# Patient Record
Sex: MALE | Race: WHITE | NOT HISPANIC OR LATINO
[De-identification: names, ages, dates, MRNs, and addresses within clinical notes are randomized per-mention and may not be internally consistent; named-entity substitution may affect disease eponyms.]

---

## 2017-03-02 ENCOUNTER — APPOINTMENT (OUTPATIENT)
Dept: UROLOGY | Facility: CLINIC | Age: 67
End: 2017-03-02

## 2017-03-07 ENCOUNTER — APPOINTMENT (OUTPATIENT)
Dept: UROLOGY | Facility: CLINIC | Age: 67
End: 2017-03-07

## 2017-04-05 ENCOUNTER — APPOINTMENT (OUTPATIENT)
Dept: UROLOGY | Facility: CLINIC | Age: 67
End: 2017-04-05

## 2017-04-05 LAB
BILIRUB UR QL STRIP: NORMAL
CLARITY UR: CLEAR
COLLECTION METHOD: NORMAL
GLUCOSE UR-MCNC: NORMAL
HCG UR QL: 0.2 EU/DL
HGB UR QL STRIP.AUTO: NORMAL
KETONES UR-MCNC: NORMAL
LEUKOCYTE ESTERASE UR QL STRIP: NORMAL
NITRITE UR QL STRIP: NORMAL
PH UR STRIP: 5.5
PROT UR STRIP-MCNC: NORMAL
SP GR UR STRIP: 1.02

## 2017-04-05 RX ORDER — AMOXICILLIN AND CLAVULANATE POTASSIUM 875; 125 MG/1; MG/1
875-125 TABLET, COATED ORAL
Qty: 14 | Refills: 0 | Status: COMPLETED | COMMUNITY
Start: 2016-12-31

## 2017-04-06 LAB — PSA SERPL-MCNC: 3.14 NG/ML

## 2017-10-04 ENCOUNTER — TRANSCRIPTION ENCOUNTER (OUTPATIENT)
Age: 67
End: 2017-10-04

## 2017-10-04 ENCOUNTER — OUTPATIENT (OUTPATIENT)
Dept: EMERGENCY DEPT | Facility: HOSPITAL | Age: 67
LOS: 1 days | Discharge: HOME | End: 2017-10-04
Payer: COMMERCIAL

## 2017-10-04 VITALS
RESPIRATION RATE: 15 BRPM | TEMPERATURE: 99 F | SYSTOLIC BLOOD PRESSURE: 166 MMHG | OXYGEN SATURATION: 99 % | HEART RATE: 67 BPM | DIASTOLIC BLOOD PRESSURE: 78 MMHG

## 2017-10-04 VITALS
DIASTOLIC BLOOD PRESSURE: 76 MMHG | HEART RATE: 64 BPM | SYSTOLIC BLOOD PRESSURE: 159 MMHG | TEMPERATURE: 98 F | RESPIRATION RATE: 16 BRPM | OXYGEN SATURATION: 100 %

## 2017-10-04 DIAGNOSIS — Z95.5 PRESENCE OF CORONARY ANGIOPLASTY IMPLANT AND GRAFT: ICD-10-CM

## 2017-10-04 LAB
ALBUMIN SERPL ELPH-MCNC: 4.4 G/DL — SIGNIFICANT CHANGE UP (ref 3.3–5)
ALP SERPL-CCNC: 76 U/L — SIGNIFICANT CHANGE UP (ref 40–120)
ALT FLD-CCNC: 26 U/L — SIGNIFICANT CHANGE UP (ref 4–41)
APTT BLD: 28.4 SEC — SIGNIFICANT CHANGE UP (ref 27.5–37.4)
AST SERPL-CCNC: 23 U/L — SIGNIFICANT CHANGE UP (ref 4–40)
BASOPHILS # BLD AUTO: 0.03 K/UL — SIGNIFICANT CHANGE UP (ref 0–0.2)
BASOPHILS NFR BLD AUTO: 0.3 % — SIGNIFICANT CHANGE UP (ref 0–2)
BILIRUB SERPL-MCNC: 0.9 MG/DL — SIGNIFICANT CHANGE UP (ref 0.2–1.2)
BLD GP AB SCN SERPL QL: NEGATIVE — SIGNIFICANT CHANGE UP
BUN SERPL-MCNC: 23 MG/DL — SIGNIFICANT CHANGE UP (ref 7–23)
CALCIUM SERPL-MCNC: 9.1 MG/DL — SIGNIFICANT CHANGE UP (ref 8.4–10.5)
CHLORIDE SERPL-SCNC: 101 MMOL/L — SIGNIFICANT CHANGE UP (ref 98–107)
CO2 SERPL-SCNC: 25 MMOL/L — SIGNIFICANT CHANGE UP (ref 22–31)
CREAT SERPL-MCNC: 1.07 MG/DL — SIGNIFICANT CHANGE UP (ref 0.5–1.3)
EOSINOPHIL # BLD AUTO: 0.12 K/UL — SIGNIFICANT CHANGE UP (ref 0–0.5)
EOSINOPHIL NFR BLD AUTO: 1.3 % — SIGNIFICANT CHANGE UP (ref 0–6)
GLUCOSE SERPL-MCNC: 157 MG/DL — HIGH (ref 70–99)
HCT VFR BLD CALC: 45.6 % — SIGNIFICANT CHANGE UP (ref 39–50)
HGB BLD-MCNC: 14.9 G/DL — SIGNIFICANT CHANGE UP (ref 13–17)
IMM GRANULOCYTES # BLD AUTO: 0.06 # — SIGNIFICANT CHANGE UP
IMM GRANULOCYTES NFR BLD AUTO: 0.7 % — SIGNIFICANT CHANGE UP (ref 0–1.5)
INR BLD: 1.01 — SIGNIFICANT CHANGE UP (ref 0.88–1.17)
LYMPHOCYTES # BLD AUTO: 1.59 K/UL — SIGNIFICANT CHANGE UP (ref 1–3.3)
LYMPHOCYTES # BLD AUTO: 17.7 % — SIGNIFICANT CHANGE UP (ref 13–44)
MCHC RBC-ENTMCNC: 26.7 PG — LOW (ref 27–34)
MCHC RBC-ENTMCNC: 32.7 % — SIGNIFICANT CHANGE UP (ref 32–36)
MCV RBC AUTO: 81.7 FL — SIGNIFICANT CHANGE UP (ref 80–100)
MONOCYTES # BLD AUTO: 0.6 K/UL — SIGNIFICANT CHANGE UP (ref 0–0.9)
MONOCYTES NFR BLD AUTO: 6.7 % — SIGNIFICANT CHANGE UP (ref 2–14)
NEUTROPHILS # BLD AUTO: 6.59 K/UL — SIGNIFICANT CHANGE UP (ref 1.8–7.4)
NEUTROPHILS NFR BLD AUTO: 73.3 % — SIGNIFICANT CHANGE UP (ref 43–77)
NRBC # FLD: 0 — SIGNIFICANT CHANGE UP
PLATELET # BLD AUTO: 196 K/UL — SIGNIFICANT CHANGE UP (ref 150–400)
PMV BLD: 10.7 FL — SIGNIFICANT CHANGE UP (ref 7–13)
POTASSIUM SERPL-MCNC: 4.3 MMOL/L — SIGNIFICANT CHANGE UP (ref 3.5–5.3)
POTASSIUM SERPL-SCNC: 4.3 MMOL/L — SIGNIFICANT CHANGE UP (ref 3.5–5.3)
PROT SERPL-MCNC: 7.6 G/DL — SIGNIFICANT CHANGE UP (ref 6–8.3)
PROTHROM AB SERPL-ACNC: 11.3 SEC — SIGNIFICANT CHANGE UP (ref 9.8–13.1)
RBC # BLD: 5.58 M/UL — SIGNIFICANT CHANGE UP (ref 4.2–5.8)
RBC # FLD: 13 % — SIGNIFICANT CHANGE UP (ref 10.3–14.5)
RH IG SCN BLD-IMP: NEGATIVE — SIGNIFICANT CHANGE UP
SODIUM SERPL-SCNC: 141 MMOL/L — SIGNIFICANT CHANGE UP (ref 135–145)
TROPONIN T SERPL-MCNC: < 0.06 NG/ML — SIGNIFICANT CHANGE UP (ref 0–0.06)
WBC # BLD: 8.99 K/UL — SIGNIFICANT CHANGE UP (ref 3.8–10.5)
WBC # FLD AUTO: 8.99 K/UL — SIGNIFICANT CHANGE UP (ref 3.8–10.5)

## 2017-10-04 PROCEDURE — 93010 ELECTROCARDIOGRAM REPORT: CPT

## 2017-10-04 RX ORDER — ACETAMINOPHEN 500 MG
650 TABLET ORAL EVERY 6 HOURS
Qty: 0 | Refills: 0 | Status: DISCONTINUED | OUTPATIENT
Start: 2017-10-04 | End: 2017-10-04

## 2017-10-04 RX ORDER — ASPIRIN/CALCIUM CARB/MAGNESIUM 324 MG
325 TABLET ORAL DAILY
Qty: 0 | Refills: 0 | Status: DISCONTINUED | OUTPATIENT
Start: 2017-10-04 | End: 2017-10-04

## 2017-10-04 RX ORDER — FINASTERIDE 5 MG/1
5 TABLET, FILM COATED ORAL DAILY
Qty: 0 | Refills: 0 | Status: DISCONTINUED | OUTPATIENT
Start: 2017-10-04 | End: 2017-10-04

## 2017-10-04 RX ORDER — ACETAMINOPHEN 500 MG
2 TABLET ORAL
Qty: 0 | Refills: 0 | COMMUNITY
Start: 2017-10-04

## 2017-10-04 RX ORDER — ATORVASTATIN CALCIUM 80 MG/1
40 TABLET, FILM COATED ORAL AT BEDTIME
Qty: 0 | Refills: 0 | Status: DISCONTINUED | OUTPATIENT
Start: 2017-10-04 | End: 2017-10-04

## 2017-10-04 RX ORDER — PANTOPRAZOLE SODIUM 20 MG/1
40 TABLET, DELAYED RELEASE ORAL
Qty: 0 | Refills: 0 | Status: DISCONTINUED | OUTPATIENT
Start: 2017-10-04 | End: 2017-10-04

## 2017-10-04 RX ORDER — LEVOTHYROXINE SODIUM 125 MCG
75 TABLET ORAL DAILY
Qty: 0 | Refills: 0 | Status: DISCONTINUED | OUTPATIENT
Start: 2017-10-04 | End: 2017-10-04

## 2017-10-04 RX ORDER — METOPROLOL TARTRATE 50 MG
50 TABLET ORAL DAILY
Qty: 0 | Refills: 0 | Status: DISCONTINUED | OUTPATIENT
Start: 2017-10-04 | End: 2017-10-04

## 2017-10-04 RX ORDER — CLOPIDOGREL BISULFATE 75 MG/1
75 TABLET, FILM COATED ORAL DAILY
Qty: 0 | Refills: 0 | Status: DISCONTINUED | OUTPATIENT
Start: 2017-10-05 | End: 2017-10-04

## 2017-10-04 RX ORDER — SODIUM CHLORIDE 9 MG/ML
500 INJECTION INTRAMUSCULAR; INTRAVENOUS; SUBCUTANEOUS
Qty: 0 | Refills: 0 | Status: DISCONTINUED | OUTPATIENT
Start: 2017-10-04 | End: 2017-10-04

## 2017-10-04 RX ADMIN — Medication 650 MILLIGRAM(S): at 10:48

## 2017-10-04 RX ADMIN — SODIUM CHLORIDE 75 MILLILITER(S): 9 INJECTION INTRAMUSCULAR; INTRAVENOUS; SUBCUTANEOUS at 10:48

## 2017-10-04 RX ADMIN — Medication 50 MILLIGRAM(S): at 10:50

## 2017-10-04 NOTE — DISCHARGE NOTE ADULT - MEDICATION SUMMARY - MEDICATIONS TO TAKE
I will START or STAY ON the medications listed below when I get home from the hospital:    finasteride 5 mg oral tablet  -- 1 tab(s) by mouth once a day  -- Indication: For enlarged prostate    acetaminophen 325 mg oral tablet  -- 2 tab(s) by mouth every 6 hours, As needed, for site discomfort  -- Indication: For CAth site discomfort    Aspirin Enteric Coated 325 mg oral delayed release tablet  -- 1 tab(s) by mouth once a day for minimum 1 year  -- Indication: For CAD (coronary artery disease)/stent    Lipitor 40 mg oral tablet  -- 1 tab(s) by mouth once a day  -- Indication: For Hyperlipidemia    Plavix 75 mg oral tablet  -- 1 tab(s) by mouth once a day for minimum 1 year  -- Indication: For CAD (coronary artery disease)/stent    metoprolol succinate 50 mg oral tablet, extended release  -- 1 tab(s) by mouth once a day  -- Indication: For Hypertension and coronary arteri disease    Probiotic Formula oral capsule  -- 1 cap(s) by mouth once a day  -- Indication: For Supplement    Protonix 40 mg oral delayed release tablet  -- 1 tab(s) by mouth once a day  -- Indication: For reflux    Levoxyl 75 mcg (0.075 mg) oral tablet  -- 1 tab(s) by mouth once a day  -- Indication: For Hypothyroid    Multiple Vitamins oral tablet  -- 1 tab(s) by mouth once a day  -- Indication: For Supplement

## 2017-10-04 NOTE — DISCHARGE NOTE ADULT - PATIENT PORTAL LINK FT
“You can access the [facility] Patient Portal, offered by [facility], by registering with the following website: [email or web address]”

## 2017-10-04 NOTE — DISCHARGE NOTE ADULT - CARE PROVIDER_API CALL
Segundo Preciado), Cardiovascular Disease; Internal Medicine  [address] [address] [place] [address]  Phone: [phone]  Fax: [phone]

## 2017-10-04 NOTE — ED ADULT TRIAGE NOTE - CHIEF COMPLAINT QUOTE
Pt c/o chest pressure for a few weeks, worse tonight.  Denies any SOB, respirations even and unlabored in triage.  Pt states is due for a cardiac cath tomorrow but was told to come in by PMD because the pressure was increasing.  PMHx:  BPH, CAD

## 2017-10-04 NOTE — DISCHARGE NOTE ADULT - HOSPITAL COURSE
67 year old male with HTN, hyperlipidemia, known CAD with AWMI 9/30/2009 with [place] stent x2 RCA who presented to his cardiologist complaining of chest spasm radiating to his throat with exertion, walking up a flight of stairs or when in a rush for the past few months, relieved with rest. Denies SOB, dizziness, syncope, edema, orthopnea, PND, increase in fatigue and decrease in exercise tolerance. Referred directly for cath as symptoms are similar to when he had his MI in 2009. Patient was scheduled as outpatient, however he developed the above complaints early this morning, concerned he presented to the ED. First troponin negative.   In light of patients CAD history and symptoms there is high suspicion for CAD progression. Patient is now referred to [facility] for a cardiac catheterization with possible PTCA/stent.   Underwent a Cardiac catheterization via right radial artery access with EF 60%, pLAD 30, prior stents of p/mRCA patent, dRCA 99 treated with a successful resolute sonia stent.  small hematoma of radial artery site  noted post procedure, treated with successful manual compression without recurrence and has remained stable and soft with 2+ radial pulse.   patient stable for discharge tonight, ambulating without complaints, radial site remains stable. Patient with wife and son at bedside, educated on need for continued dual antiplatelet therapy with ASA and Plavix therapy.

## 2017-10-04 NOTE — H&P CARDIOLOGY - HISTORY OF PRESENT ILLNESS
67 year old male with HTN, hyperlipidemia, known CAD with AWMI 9/30/2009 with [place] stent x2 RCA who presented to his cardiologist complaining of chest spasm radiating to his throat with exertion, walking up a flight of stairs or when in a rush, relieved with rest. Denies SOB, dizziness, syncope, edema, orthopnea, PND, increase in fatigue and decrease in exercise tolerance. Referred directly for cath as symptoms are similar to when he had his MI in 2009. Patient was scheduled as outpatient, however he develped gus dailey 67 year old male with HTN, hyperlipidemia, known CAD with AWMI 9/30/2009 with [place] stent x2 RCA who presented to his cardiologist complaining of chest spasm radiating to his throat with exertion, walking up a flight of stairs or when in a rush for the past few months, relieved with rest. Denies SOB, dizziness, syncope, edema, orthopnea, PND, increase in fatigue and decrease in exercise tolerance. Referred directly for cath as symptoms are similar to when he had his MI in 2009. Patient was scheduled as outpatient, however he developed the above complaints early this morning, concerned he presented to the ED. First troponin negative.   In light of patients CAD history and symptoms there is high suspicion for CAD progression. Patient is now referred to [facility] for a cardiac catheterization with possible PTCA/stent.

## 2017-10-04 NOTE — H&P CARDIOLOGY - FAMILY HISTORY
Mother  Still living? Yes, Estimated age: 81-90  Family history of stent, Age at diagnosis: Age Unknown

## 2017-10-04 NOTE — DISCHARGE NOTE ADULT - PLAN OF CARE
s/p resolute sonia stent dRCA Follow up with Dr Preciado in 1week  continue dual antiplatelet therapy with ASA and Plavix therapy for a minimum of 1 year  low salt, low fat, low cholesterol continue prescribed medications  low salt, low fat, low cholesterol

## 2017-10-04 NOTE — DISCHARGE NOTE ADULT - INSTRUCTIONS
No heavy lifting greater than 5-10 pounds x one week.  No strenuous activity x 3 weeks.  Monitor site of procedure and notify your doctor for any redness/swelling/discharge.

## 2017-10-04 NOTE — ED PROVIDER NOTE - PROGRESS NOTE DETAILS
Danyell att: EKg unchanged t wave inversion III, t wave flat avF, no stemi. Patient took asa plavix. Case d/w Koss. Recommend keep on asa plavix, neg heparin, will take to Cath lab at 07:30. patient and family notified. Will admit once labs are back. Danyell att; Patient taken to cardiac cath lab. Due to lab machine down time only cbc and coags back and nl. Will request Koss f/up with cmp and enzymes.

## 2017-10-04 NOTE — ED PROVIDER NOTE - OBJECTIVE STATEMENT
05:42 Danyell att: 67M h/o cad 2 stents in 2009 c/o cp. Past two weeks patient notes neck tightness, intermitt, x min, associated with exertion. Past few days patient notes increasing tightness with minimal exertion. Seen by Dr. Engel, scheduled for cardiac cath today at 16:30. Patient presents with note, "Patient advised to report the emergency room if any further episodes of pain. Dr. Engel aware and wants to be immediately contacted if patietn has more pain and has to go to the Emergency Room." This evening patient experienced midsternal chest heaviness, x minutes, some radiating to upper sternum. Denies n, sob, diaphoresis.

## 2017-10-04 NOTE — CHART NOTE - NSCHARTNOTEFT_GEN_A_CORE
patient presented to [facility] with continued throat tightness (same symptoms that brought him in for procedure) post procedure with little improvement, given SL NTG x2 in lab. post EKG with no changes. VSS, will monitor. await tele admission.  Patient just re-evaluated and symptoms have completely resolved.

## 2017-10-04 NOTE — ED PROVIDER NOTE - MEDICAL DECISION MAKING DETAILS
Worsening exertional chest pain h/o acs with 2 stents. Ekg uncahnged. Plan xr, labs, admit for urgent cath.

## 2017-10-15 RX ORDER — CLOPIDOGREL BISULFATE 75 MG/1
1 TABLET, FILM COATED ORAL
Qty: 0 | Refills: 0 | COMMUNITY

## 2017-10-15 RX ORDER — ASPIRIN/CALCIUM CARB/MAGNESIUM 324 MG
1 TABLET ORAL
Qty: 0 | Refills: 0 | COMMUNITY

## 2017-12-27 ENCOUNTER — APPOINTMENT (OUTPATIENT)
Dept: OTOLARYNGOLOGY | Facility: CLINIC | Age: 67
End: 2017-12-27
Payer: COMMERCIAL

## 2017-12-27 VITALS
HEART RATE: 78 BPM | HEIGHT: 67.5 IN | WEIGHT: 199 LBS | DIASTOLIC BLOOD PRESSURE: 82 MMHG | SYSTOLIC BLOOD PRESSURE: 178 MMHG | BODY MASS INDEX: 30.87 KG/M2 | RESPIRATION RATE: 18 BRPM

## 2017-12-27 DIAGNOSIS — R04.0 EPISTAXIS: ICD-10-CM

## 2017-12-27 PROCEDURE — 99204 OFFICE O/P NEW MOD 45 MIN: CPT | Mod: 25

## 2017-12-27 PROCEDURE — 31238 NSL/SINS NDSC SRG NSL HEMRRG: CPT

## 2017-12-27 RX ORDER — CLOPIDOGREL BISULFATE 75 MG/1
75 TABLET, FILM COATED ORAL
Qty: 90 | Refills: 0 | Status: ACTIVE | COMMUNITY
Start: 2017-07-28

## 2017-12-27 RX ORDER — METOPROLOL SUCCINATE 50 MG/1
50 TABLET, EXTENDED RELEASE ORAL
Qty: 90 | Refills: 0 | Status: ACTIVE | COMMUNITY
Start: 2017-07-06

## 2018-04-04 ENCOUNTER — APPOINTMENT (OUTPATIENT)
Dept: UROLOGY | Facility: CLINIC | Age: 68
End: 2018-04-04
Payer: COMMERCIAL

## 2018-04-04 VITALS
HEIGHT: 67.5 IN | SYSTOLIC BLOOD PRESSURE: 170 MMHG | WEIGHT: 190 LBS | DIASTOLIC BLOOD PRESSURE: 69 MMHG | TEMPERATURE: 97.9 F | HEART RATE: 64 BPM | RESPIRATION RATE: 18 BRPM | BODY MASS INDEX: 29.47 KG/M2

## 2018-04-04 PROCEDURE — 99214 OFFICE O/P EST MOD 30 MIN: CPT

## 2018-04-05 ENCOUNTER — TRANSCRIPTION ENCOUNTER (OUTPATIENT)
Age: 68
End: 2018-04-05

## 2018-04-05 LAB — PSA SERPL-MCNC: 3.08 NG/ML

## 2018-04-29 NOTE — H&P CARDIOLOGY - PMH
Benign prostatic hypertrophy    CAD (coronary artery disease)    Coronary artery disease    HTN (hypertension)    Myocardial infarction acute  anterior wall 2009  Stented coronary artery  endeavor x2 RCA 2009 - [facility] No significant past surgical history

## 2019-04-03 ENCOUNTER — APPOINTMENT (OUTPATIENT)
Dept: UROLOGY | Facility: CLINIC | Age: 69
End: 2019-04-03
Payer: COMMERCIAL

## 2019-04-03 PROCEDURE — 99214 OFFICE O/P EST MOD 30 MIN: CPT

## 2019-04-03 NOTE — PHYSICAL EXAM
[General Appearance - Well Developed] : well developed [General Appearance - Well Nourished] : well nourished [Normal Appearance] : normal appearance [Well Groomed] : well groomed [General Appearance - In No Acute Distress] : no acute distress [Abdomen Soft] : soft [Abdomen Tenderness] : non-tender [Costovertebral Angle Tenderness] : no ~M costovertebral angle tenderness [Urethral Meatus] : meatus normal [Penis Abnormality] : normal uncircumcised penis [Urinary Bladder Findings] : the bladder was normal on palpation [Scrotum] : the scrotum was normal [Epididymis] : the epididymides were normal [Testes Mass (___cm)] : there were no testicular masses [Rectal Exam - Rectum] : digital rectal exam was normal [No Prostate Nodules] : no prostate nodules [Prostate Size ___ (0-4)] : prostate size [unfilled] (scale: 0-4) [Edema] : no peripheral edema [] : no respiratory distress [Respiration, Rhythm And Depth] : normal respiratory rhythm and effort [Exaggerated Use Of Accessory Muscles For Inspiration] : no accessory muscle use [Oriented To Time, Place, And Person] : oriented to person, place, and time [Affect] : the affect was normal [Mood] : the mood was normal [Not Anxious] : not anxious [Normal Station and Gait] : the gait and station were normal for the patient's age [No Focal Deficits] : no focal deficits [No Palpable Adenopathy] : no palpable adenopathy

## 2019-04-03 NOTE — LETTER BODY
[Dear  ___] : Dear  [unfilled], [I had the pleasure of evaluating your patient, [unfilled]. Thank you for referring this patient for consultation for ___] : I had the pleasure of evaluating your patient, [unfilled]. Thank you for referring this patient for consultation for [unfilled]. [Attached please find my note.] : Attached please find my note. [Thank you very much for allowing me to participate in the care of this patient. If you have any questions, please do not hesitate to contact me] : Thank you very much for allowing me to participate in the care of this patient. If you have any questions, please do not hesitate to contact me. [FreeTextEntry1] : 69 y/o male present for f/u visit with Hx of BPH and Elevated PSA. Pt has no complaints at this time. medications and allergies reviewed.\par \par Notably has some nocturia, ~ 2-3/night, though stops fluid consumption after dinner, and no alcohol or caffeine.  No hematuria, no urge/frequency, flank or abdominal pain.\par \par H/o negative prostate biopsy approx 6 years ago, and subsequently had MRI prostate for changes of the PSA .\par \par On finasteride- ~ 6-7 years. \par \par PSA: \par 3.08- 4/2018\par 3.14- 4/2017\par 3.63 - 9/2016\par 3.79- 12/2015\par 4.38 6/2015\par 5.81 - 2/2015\par 4.76 - 12/2014\par 4.16 -  6/2013\par \par PE wnl today. Prostate continues mildly enlarged.\par \par PSA history has been very controlled and consistent- will check today.\par \par D/w pt at length all options, f/u plans, psa all reviewed.\par \par 1. PSA level to update today- we can review by phone\par 2. If stable, can f/u in a year- recommend yearly f/u given PSA stability, meds and symptoms at this time.\par

## 2019-04-03 NOTE — HISTORY OF PRESENT ILLNESS
[FreeTextEntry1] : 69 y/o male present for f/u visit with Hx of BPH and Elevated PSA. Pt has no complaints at this time. medications and allergies reviewed.\par \par Notably has some nocturia, ~ 2-3/night, though stops fluid consumption after dinner, and no alcohol or caffeine.  No hematuria, no urge/frequency, flank or abdominal pain.\par \par H/o negative prostate biopsy approx 6 years ago, and subsequently had MRI prostate for changes of the PSA .\par \par On finasteride- ~ 6-7 years. \par \par PSA: \par 3.08- 4/2018\par 3.14- 4/2017\par 3.63 - 9/2016\par 3.79- 12/2015\par 4.38 6/2015\par 5.81 - 2/2015\par 4.76 - 12/2014\par 4.16 -  6/2013\par \par  [Nocturia] : nocturia

## 2019-04-03 NOTE — ASSESSMENT
[FreeTextEntry1] : PE wnl today\par \par PSA history has been very controlled and consistent- will check today.\par \par D/w pt at length all options, f/u plans, psa all reviewed.\par \par 1. PSA level to update today- we can review by phone\par 2. If stable, can f/u in a year- recommend yearly f/u given PSA stability, meds and symptoms at this time.

## 2019-04-04 PROBLEM — I21.3 ST ELEVATION (STEMI) MYOCARDIAL INFARCTION OF UNSPECIFIED SITE: Chronic | Status: ACTIVE | Noted: 2017-10-04

## 2019-04-04 PROBLEM — I25.10 ATHEROSCLEROTIC HEART DISEASE OF NATIVE CORONARY ARTERY WITHOUT ANGINA PECTORIS: Chronic | Status: ACTIVE | Noted: 2017-10-04

## 2019-04-04 PROBLEM — I10 ESSENTIAL (PRIMARY) HYPERTENSION: Chronic | Status: ACTIVE | Noted: 2017-10-04

## 2019-04-04 PROBLEM — Z95.5 PRESENCE OF CORONARY ANGIOPLASTY IMPLANT AND GRAFT: Chronic | Status: ACTIVE | Noted: 2017-10-04

## 2019-04-04 LAB
APPEARANCE: CLEAR
BACTERIA: NEGATIVE
BILIRUBIN URINE: NEGATIVE
BLOOD URINE: NEGATIVE
COLOR: YELLOW
GLUCOSE QUALITATIVE U: NEGATIVE
HYALINE CASTS: 0 /LPF
KETONES URINE: NEGATIVE
LEUKOCYTE ESTERASE URINE: NEGATIVE
MICROSCOPIC-UA: NORMAL
NITRITE URINE: NEGATIVE
PH URINE: 6.5
PROTEIN URINE: NORMAL
PSA SERPL-MCNC: 2.89 NG/ML
RED BLOOD CELLS URINE: 2 /HPF
SPECIFIC GRAVITY URINE: 1.03
SQUAMOUS EPITHELIAL CELLS: 1 /HPF
UROBILINOGEN URINE: NORMAL
WHITE BLOOD CELLS URINE: 1 /HPF

## 2019-04-15 ENCOUNTER — INPATIENT (INPATIENT)
Facility: HOSPITAL | Age: 69
LOS: 1 days | Discharge: HOME | End: 2019-04-17
Attending: GENERAL PRACTICE | Admitting: GENERAL PRACTICE
Payer: COMMERCIAL

## 2019-04-15 VITALS
RESPIRATION RATE: 15 BRPM | OXYGEN SATURATION: 100 % | DIASTOLIC BLOOD PRESSURE: 54 MMHG | TEMPERATURE: 98 F | SYSTOLIC BLOOD PRESSURE: 148 MMHG | HEART RATE: 61 BPM

## 2019-04-15 DIAGNOSIS — Z29.9 ENCOUNTER FOR PROPHYLACTIC MEASURES, UNSPECIFIED: ICD-10-CM

## 2019-04-15 DIAGNOSIS — R55 SYNCOPE AND COLLAPSE: ICD-10-CM

## 2019-04-15 DIAGNOSIS — I25.10 ATHEROSCLEROTIC HEART DISEASE OF NATIVE CORONARY ARTERY WITHOUT ANGINA PECTORIS: ICD-10-CM

## 2019-04-15 DIAGNOSIS — I10 ESSENTIAL (PRIMARY) HYPERTENSION: ICD-10-CM

## 2019-04-15 DIAGNOSIS — N40.0 BENIGN PROSTATIC HYPERPLASIA WITHOUT LOWER URINARY TRACT SYMPTOMS: ICD-10-CM

## 2019-04-15 DIAGNOSIS — R19.7 DIARRHEA, UNSPECIFIED: ICD-10-CM

## 2019-04-15 LAB
ALBUMIN SERPL ELPH-MCNC: 4.2 G/DL — SIGNIFICANT CHANGE UP (ref 3.3–5)
ALP SERPL-CCNC: 80 U/L — SIGNIFICANT CHANGE UP (ref 40–120)
ALT FLD-CCNC: 37 U/L — SIGNIFICANT CHANGE UP (ref 4–41)
ANION GAP SERPL CALC-SCNC: 11 MMO/L — SIGNIFICANT CHANGE UP (ref 7–14)
ANION GAP SERPL CALC-SCNC: 11 MMO/L — SIGNIFICANT CHANGE UP (ref 7–14)
APTT BLD: 26.1 SEC — LOW (ref 27.5–36.3)
AST SERPL-CCNC: 32 U/L — SIGNIFICANT CHANGE UP (ref 4–40)
BASOPHILS # BLD AUTO: 0.02 K/UL — SIGNIFICANT CHANGE UP (ref 0–0.2)
BASOPHILS # BLD AUTO: 0.05 K/UL — SIGNIFICANT CHANGE UP (ref 0–0.2)
BASOPHILS NFR BLD AUTO: 0.2 % — SIGNIFICANT CHANGE UP (ref 0–2)
BASOPHILS NFR BLD AUTO: 0.3 % — SIGNIFICANT CHANGE UP (ref 0–2)
BILIRUB SERPL-MCNC: 0.6 MG/DL — SIGNIFICANT CHANGE UP (ref 0.2–1.2)
BUN SERPL-MCNC: 19 MG/DL — SIGNIFICANT CHANGE UP (ref 7–23)
BUN SERPL-MCNC: 25 MG/DL — HIGH (ref 7–23)
CALCIUM SERPL-MCNC: 9.1 MG/DL — SIGNIFICANT CHANGE UP (ref 8.4–10.5)
CALCIUM SERPL-MCNC: 9.4 MG/DL — SIGNIFICANT CHANGE UP (ref 8.4–10.5)
CHLORIDE SERPL-SCNC: 102 MMOL/L — SIGNIFICANT CHANGE UP (ref 98–107)
CHLORIDE SERPL-SCNC: 105 MMOL/L — SIGNIFICANT CHANGE UP (ref 98–107)
CK MB BLD-MCNC: 3 — HIGH (ref 0–2.5)
CK MB BLD-MCNC: 5.28 NG/ML — SIGNIFICANT CHANGE UP (ref 1–6.6)
CK SERPL-CCNC: 178 U/L — SIGNIFICANT CHANGE UP (ref 30–200)
CO2 SERPL-SCNC: 23 MMOL/L — SIGNIFICANT CHANGE UP (ref 22–31)
CO2 SERPL-SCNC: 24 MMOL/L — SIGNIFICANT CHANGE UP (ref 22–31)
CREAT SERPL-MCNC: 0.92 MG/DL — SIGNIFICANT CHANGE UP (ref 0.5–1.3)
CREAT SERPL-MCNC: 0.97 MG/DL — SIGNIFICANT CHANGE UP (ref 0.5–1.3)
EOSINOPHIL # BLD AUTO: 0.09 K/UL — SIGNIFICANT CHANGE UP (ref 0–0.5)
EOSINOPHIL # BLD AUTO: 0.1 K/UL — SIGNIFICANT CHANGE UP (ref 0–0.5)
EOSINOPHIL NFR BLD AUTO: 0.5 % — SIGNIFICANT CHANGE UP (ref 0–6)
EOSINOPHIL NFR BLD AUTO: 0.8 % — SIGNIFICANT CHANGE UP (ref 0–6)
GLUCOSE SERPL-MCNC: 148 MG/DL — HIGH (ref 70–99)
GLUCOSE SERPL-MCNC: 178 MG/DL — HIGH (ref 70–99)
HCT VFR BLD CALC: 43.3 % — SIGNIFICANT CHANGE UP (ref 39–50)
HCT VFR BLD CALC: 45.6 % — SIGNIFICANT CHANGE UP (ref 39–50)
HGB BLD-MCNC: 14 G/DL — SIGNIFICANT CHANGE UP (ref 13–17)
HGB BLD-MCNC: 15 G/DL — SIGNIFICANT CHANGE UP (ref 13–17)
IMM GRANULOCYTES NFR BLD AUTO: 0.4 % — SIGNIFICANT CHANGE UP (ref 0–1.5)
IMM GRANULOCYTES NFR BLD AUTO: 0.4 % — SIGNIFICANT CHANGE UP (ref 0–1.5)
INR BLD: 0.98 — SIGNIFICANT CHANGE UP (ref 0.88–1.17)
LYMPHOCYTES # BLD AUTO: 0.81 K/UL — LOW (ref 1–3.3)
LYMPHOCYTES # BLD AUTO: 1.22 K/UL — SIGNIFICANT CHANGE UP (ref 1–3.3)
LYMPHOCYTES # BLD AUTO: 10.8 % — LOW (ref 13–44)
LYMPHOCYTES # BLD AUTO: 4.3 % — LOW (ref 13–44)
MAGNESIUM SERPL-MCNC: 2 MG/DL — SIGNIFICANT CHANGE UP (ref 1.6–2.6)
MCHC RBC-ENTMCNC: 26.8 PG — LOW (ref 27–34)
MCHC RBC-ENTMCNC: 27.1 PG — SIGNIFICANT CHANGE UP (ref 27–34)
MCHC RBC-ENTMCNC: 32.3 % — SIGNIFICANT CHANGE UP (ref 32–36)
MCHC RBC-ENTMCNC: 32.9 % — SIGNIFICANT CHANGE UP (ref 32–36)
MCV RBC AUTO: 81.4 FL — SIGNIFICANT CHANGE UP (ref 80–100)
MCV RBC AUTO: 83.8 FL — SIGNIFICANT CHANGE UP (ref 80–100)
MONOCYTES # BLD AUTO: 0.73 K/UL — SIGNIFICANT CHANGE UP (ref 0–0.9)
MONOCYTES # BLD AUTO: 1.12 K/UL — HIGH (ref 0–0.9)
MONOCYTES NFR BLD AUTO: 5.9 % — SIGNIFICANT CHANGE UP (ref 2–14)
MONOCYTES NFR BLD AUTO: 6.5 % — SIGNIFICANT CHANGE UP (ref 2–14)
NEUTROPHILS # BLD AUTO: 16.74 K/UL — HIGH (ref 1.8–7.4)
NEUTROPHILS # BLD AUTO: 9.2 K/UL — HIGH (ref 1.8–7.4)
NEUTROPHILS NFR BLD AUTO: 81.3 % — HIGH (ref 43–77)
NEUTROPHILS NFR BLD AUTO: 88.6 % — HIGH (ref 43–77)
NRBC # FLD: 0 K/UL — SIGNIFICANT CHANGE UP (ref 0–0)
NRBC # FLD: 0 K/UL — SIGNIFICANT CHANGE UP (ref 0–0)
PHOSPHATE SERPL-MCNC: 3.3 MG/DL — SIGNIFICANT CHANGE UP (ref 2.5–4.5)
PLATELET # BLD AUTO: 176 K/UL — SIGNIFICANT CHANGE UP (ref 150–400)
PLATELET # BLD AUTO: 185 K/UL — SIGNIFICANT CHANGE UP (ref 150–400)
PMV BLD: 10.9 FL — SIGNIFICANT CHANGE UP (ref 7–13)
PMV BLD: 11.1 FL — SIGNIFICANT CHANGE UP (ref 7–13)
POTASSIUM SERPL-MCNC: 4.1 MMOL/L — SIGNIFICANT CHANGE UP (ref 3.5–5.3)
POTASSIUM SERPL-MCNC: 4.2 MMOL/L — SIGNIFICANT CHANGE UP (ref 3.5–5.3)
POTASSIUM SERPL-SCNC: 4.1 MMOL/L — SIGNIFICANT CHANGE UP (ref 3.5–5.3)
POTASSIUM SERPL-SCNC: 4.2 MMOL/L — SIGNIFICANT CHANGE UP (ref 3.5–5.3)
PROT SERPL-MCNC: 7.2 G/DL — SIGNIFICANT CHANGE UP (ref 6–8.3)
PROTHROM AB SERPL-ACNC: 10.9 SEC — SIGNIFICANT CHANGE UP (ref 9.8–13.1)
RBC # BLD: 5.17 M/UL — SIGNIFICANT CHANGE UP (ref 4.2–5.8)
RBC # BLD: 5.6 M/UL — SIGNIFICANT CHANGE UP (ref 4.2–5.8)
RBC # FLD: 13.2 % — SIGNIFICANT CHANGE UP (ref 10.3–14.5)
RBC # FLD: 13.2 % — SIGNIFICANT CHANGE UP (ref 10.3–14.5)
SODIUM SERPL-SCNC: 137 MMOL/L — SIGNIFICANT CHANGE UP (ref 135–145)
SODIUM SERPL-SCNC: 139 MMOL/L — SIGNIFICANT CHANGE UP (ref 135–145)
TROPONIN T, HIGH SENSITIVITY: 12 NG/L — SIGNIFICANT CHANGE UP (ref ?–14)
TROPONIN T, HIGH SENSITIVITY: 13 NG/L — SIGNIFICANT CHANGE UP (ref ?–14)
WBC # BLD: 11.3 K/UL — HIGH (ref 3.8–10.5)
WBC # BLD: 18.9 K/UL — HIGH (ref 3.8–10.5)
WBC # FLD AUTO: 11.3 K/UL — HIGH (ref 3.8–10.5)
WBC # FLD AUTO: 18.9 K/UL — HIGH (ref 3.8–10.5)

## 2019-04-15 PROCEDURE — 99222 1ST HOSP IP/OBS MODERATE 55: CPT

## 2019-04-15 PROCEDURE — 93306 TTE W/DOPPLER COMPLETE: CPT | Mod: 26

## 2019-04-15 RX ORDER — L.ACIDOPH/B.ANIMALIS/B.LONGUM 15B CELL
1 CAPSULE ORAL
Qty: 0 | Refills: 0 | COMMUNITY

## 2019-04-15 RX ORDER — ASPIRIN/CALCIUM CARB/MAGNESIUM 324 MG
81 TABLET ORAL DAILY
Qty: 0 | Refills: 0 | Status: DISCONTINUED | OUTPATIENT
Start: 2019-04-15 | End: 2019-04-17

## 2019-04-15 RX ORDER — ATORVASTATIN CALCIUM 80 MG/1
40 TABLET, FILM COATED ORAL AT BEDTIME
Qty: 0 | Refills: 0 | Status: DISCONTINUED | OUTPATIENT
Start: 2019-04-15 | End: 2019-04-17

## 2019-04-15 RX ORDER — LEVOTHYROXINE SODIUM 125 MCG
1 TABLET ORAL
Qty: 0 | Refills: 0 | COMMUNITY

## 2019-04-15 RX ORDER — FINASTERIDE 5 MG/1
5 TABLET, FILM COATED ORAL DAILY
Qty: 0 | Refills: 0 | Status: DISCONTINUED | OUTPATIENT
Start: 2019-04-15 | End: 2019-04-17

## 2019-04-15 RX ORDER — METOPROLOL TARTRATE 50 MG
50 TABLET ORAL DAILY
Qty: 0 | Refills: 0 | Status: DISCONTINUED | OUTPATIENT
Start: 2019-04-15 | End: 2019-04-17

## 2019-04-15 RX ORDER — SODIUM CHLORIDE 9 MG/ML
1000 INJECTION INTRAMUSCULAR; INTRAVENOUS; SUBCUTANEOUS
Qty: 0 | Refills: 0 | Status: DISCONTINUED | OUTPATIENT
Start: 2019-04-15 | End: 2019-04-17

## 2019-04-15 RX ORDER — ASPIRIN/CALCIUM CARB/MAGNESIUM 324 MG
1 TABLET ORAL
Qty: 0 | Refills: 0 | COMMUNITY

## 2019-04-15 RX ORDER — ASPIRIN/CALCIUM CARB/MAGNESIUM 324 MG
162 TABLET ORAL ONCE
Qty: 0 | Refills: 0 | Status: COMPLETED | OUTPATIENT
Start: 2019-04-15 | End: 2019-04-15

## 2019-04-15 RX ORDER — SODIUM CHLORIDE 9 MG/ML
3 INJECTION INTRAMUSCULAR; INTRAVENOUS; SUBCUTANEOUS EVERY 8 HOURS
Qty: 0 | Refills: 0 | Status: DISCONTINUED | OUTPATIENT
Start: 2019-04-15 | End: 2019-04-17

## 2019-04-15 RX ORDER — HEPARIN SODIUM 5000 [USP'U]/ML
5000 INJECTION INTRAVENOUS; SUBCUTANEOUS EVERY 12 HOURS
Qty: 0 | Refills: 0 | Status: DISCONTINUED | OUTPATIENT
Start: 2019-04-15 | End: 2019-04-17

## 2019-04-15 RX ORDER — PANTOPRAZOLE SODIUM 20 MG/1
1 TABLET, DELAYED RELEASE ORAL
Qty: 0 | Refills: 0 | COMMUNITY

## 2019-04-15 RX ORDER — CLOPIDOGREL BISULFATE 75 MG/1
75 TABLET, FILM COATED ORAL DAILY
Qty: 0 | Refills: 0 | Status: DISCONTINUED | OUTPATIENT
Start: 2019-04-15 | End: 2019-04-17

## 2019-04-15 RX ADMIN — SODIUM CHLORIDE 70 MILLILITER(S): 9 INJECTION INTRAMUSCULAR; INTRAVENOUS; SUBCUTANEOUS at 06:32

## 2019-04-15 RX ADMIN — Medication 1 TABLET(S): at 11:08

## 2019-04-15 RX ADMIN — Medication 81 MILLIGRAM(S): at 11:08

## 2019-04-15 RX ADMIN — SODIUM CHLORIDE 3 MILLILITER(S): 9 INJECTION INTRAMUSCULAR; INTRAVENOUS; SUBCUTANEOUS at 14:01

## 2019-04-15 RX ADMIN — Medication 162 MILLIGRAM(S): at 04:04

## 2019-04-15 RX ADMIN — ATORVASTATIN CALCIUM 40 MILLIGRAM(S): 80 TABLET, FILM COATED ORAL at 22:32

## 2019-04-15 RX ADMIN — SODIUM CHLORIDE 3 MILLILITER(S): 9 INJECTION INTRAMUSCULAR; INTRAVENOUS; SUBCUTANEOUS at 22:32

## 2019-04-15 RX ADMIN — SODIUM CHLORIDE 3 MILLILITER(S): 9 INJECTION INTRAMUSCULAR; INTRAVENOUS; SUBCUTANEOUS at 06:18

## 2019-04-15 RX ADMIN — CLOPIDOGREL BISULFATE 75 MILLIGRAM(S): 75 TABLET, FILM COATED ORAL at 11:08

## 2019-04-15 RX ADMIN — FINASTERIDE 5 MILLIGRAM(S): 5 TABLET, FILM COATED ORAL at 22:32

## 2019-04-15 NOTE — H&P ADULT - PROBLEM SELECTOR PLAN 2
C-diff  Stool culture/Ova/parasite ordered   Will start with clear diet  Gentle IVF ordered given patient with multiple episodes of diarrhea and elevated BUN/Cr ratio.  Blood cultures ordered given leukocytosis

## 2019-04-15 NOTE — ED PROVIDER NOTE - PMH
Benign prostatic hypertrophy    CAD (coronary artery disease)    Coronary artery disease    HTN (hypertension)    Myocardial infarction acute  anterior wall 2009  Stented coronary artery  endeavor x2 RCA 2009 - [facility]

## 2019-04-15 NOTE — H&P ADULT - ATTENDING COMMENTS
Patient seen, examined, and interviewed by me, case discussed with me, chart reviewed, agree with above H/P as reviewed and edited by me.  See  full note above.  discussed in detail with pt, family, PA  pt likely with vasovagal syncope post BM / several episodes of diarrhea at home  likely viral gastroenteritis  for echo  check orthostatics  cardio  / EP eval  stool studies as ordered    pt now states no further Dh today

## 2019-04-15 NOTE — ED ADULT NURSE NOTE - NSIMPLEMENTINTERV_GEN_ALL_ED
Implemented All Fall with Harm Risk Interventions:  [place] to call system. Call bell, personal items and telephone within reach. Instruct patient to call for assistance. Room bathroom lighting operational. Non-slip footwear when patient is off stretcher. Physically safe environment: no spills, clutter or unnecessary equipment. Stretcher in lowest position, wheels locked, appropriate side rails in place. Provide visual cue, wrist band, yellow gown, etc. Monitor gait and stability. Monitor for mental status changes and reorient to person, place, and time. Review medications for side effects contributing to fall risk. Reinforce activity limits and safety measures with patient and family. Provide visual clues: red socks.

## 2019-04-15 NOTE — ED PROVIDER NOTE - ATTENDING CONTRIBUTION TO CARE
68 M hx CAD w/stents, HTN now BIBEMS (EMS report not available at time of ED exam) w/syncopal episode x2.  Pt was working in garden all day, but ate normally.  He was unable to sleep, but then suddenly had n & dry heaves, then synopsized briefly - caught by family w/o head trauma.  After pt returned to baseline, he again synopsized.  Pt back to baseline & acting in usual state of health by time of EMS arrival & since. No fever or B sx    VS: WNL no fever  Gen: Well appearing in NAD  Head: NC/AT  Neck: trachea midline  Resp:  No distress  Ext: no deformities  Neuro:  A&O appears non focal  Skin:  Warm and dry as visualized  Psych:  Normal affect and mood     EKG: NSR @64. incomplete RBBB.  No ischemic S-T/Tw changes. There are no signs of acute ischemia, heart block, WPW, long QT, HOCM, ARVD, or brugada on the Emergency Department EKG.    syncope - worrisome for arrythmia donovan in this pt CAD vs vasovagal.  No PE RFs.   CP most c/w ACS  based on hx, CAD risk factors & ED EKG.    Plan: 1) serial EKGs/CXR/ASA/cardiac monitor/labs/nitro prn CP.  2) reassess.  3) admit to telemetry 68 M hx CAD w/stents, HTN now BIBEMS (EMS report not available at time of ED exam) w/syncopal episode x2.  Pt was working in garden all day, but ate normally.  He was unable to sleep, but then suddenly had n & dry heaves, then synopsized briefly - caught by family w/o head trauma.  After pt returned to baseline, he again synopsized.  Pt back to baseline & acting in usual state of health by time of EMS arrival & since. No fever or B sx    VS: WNL no fever  Gen: Well appearing in NAD  Head: NC/AT  Neck: trachea midline  Resp:  No distress  Ext: no deformities  Neuro:  A&O appears non focal  Skin:  Warm and dry as visualized  Psych:  Normal affect and mood     EKG: NSR @64. incomplete RBBB.  No ischemic S-T/Tw changes. There are no signs of acute ischemia, heart block, WPW, long QT, HOCM, ARVD, or brugada on the Emergency Department EKG.    syncope - worrisome for arrythmia donovan in this pt CAD vs vasovagal.  No PE RFs.  WBC elevated likely acute stress from syncope.  No sz.  No infx sx.    Plan: 1) serial EKGs/CXR/ASA/cardiac monitor/labs/nitro prn CP.  2) reassess.  3) admit to telemetry 68 M hx CAD w/stents, HTN now BIBEMS (EMS report not available at time of ED exam) w/syncopal episode x2.  Pt was working in garden all day, but ate normally.  He was unable to sleep, but then suddenly had n & dry heaves, diarrhea, then synopsized briefly - caught by family w/o head trauma.  After pt returned to baseline, he again synopsized after dry heaving.  Pt back to baseline & acting in usual state of health by time of EMS arrival & since. No fever or B sx    VS: WNL no fever  Gen: Well appearing in NAD  Head: NC/AT  Neck: trachea midline  Resp:  No distress  CV: rrr  .abd: soft, NT, ND No abd bruits or thrills.  No pulsatile abd masses.  No pulse deficits x4 ext.   Ext: no deformities  Neuro:  A&O appears non focal  Skin:  Warm and dry as visualized  Psych:  Normal affect and mood     EKG: NSR @64. incomplete RBBB.  No ischemic S-T/Tw changes. There are no signs of acute ischemia, heart block, WPW, long QT, HOCM, ARVD, or brugada on the Emergency Department EKG.    syncope - worrisome for arrythmia donovan in this pt CAD vs vasovagal.  No PE RFs.  WBC elevated likely acute stress from syncope.  No sz.  No infx sx.    Plan: 1) serial EKGs/CXR/ASA/cardiac monitor/labs/nitro prn CP.  2) reassess.  3) admit to telemetry

## 2019-04-15 NOTE — CONSULT NOTE ADULT - SUBJECTIVE AND OBJECTIVE BOX
CHIEF COMPLAINT: syncope    HISTORY OF PRESENT ILLNESS:  69 y/o M with hx of CAD with stents in setting of NSTEMI (presented with GI sx at the time), HTN, HLD presents with syncope. Patient states he had an episode fo nausea then had an episode of loose BM. He then had a episode of LOC. His wife caught him; therefore, he did not have any trauma. Patient was back to his baseline within few seconds. When he tried to get up and his legs felt weak and patient had an episode where he almost passed out. Denies fever, chills, cough, chest pain, abdominal pain, vomiting, hematemesis, constipation, melena hematochezia  or LE edema.     No sick contacts or other family members with diarrhea. He also denies to eating out. Patient did mention his dog also has diarrhea. barby had multiple episodes of diarrhea since arriving to the ED      Allergies  No Known Allergies      MEDICATIONS:  aspirin enteric coated 81 milliGRAM(s) Oral daily  clopidogrel Tablet 75 milliGRAM(s) Oral daily  heparin  Injectable 5000 Unit(s) SubCutaneous every 12 hours  metoprolol succinate ER 50 milliGRAM(s) Oral daily  atorvastatin 40 milliGRAM(s) Oral at bedtime  finasteride 5 milliGRAM(s) Oral daily  multivitamin 1 Tablet(s) Oral daily  sodium chloride 0.9% lock flush 3 milliLiter(s) IV Push every 8 hours  sodium chloride 0.9%. 1000 milliLiter(s) IV Continuous <Continuous>      PAST MEDICAL & SURGICAL HISTORY:  Stented coronary artery: [place] x2 RCA 2009 - [facility]  CAD (coronary artery disease)  Myocardial infarction acute: anterior wall 2009  HTN (hypertension)  Benign prostatic hypertrophy  Coronary artery disease  No significant past surgical history      FAMILY HISTORY:  Family history of stent: mother living with coronary stents placed at 81yo      SOCIAL HISTORY:    non smoker. independent in adl      REVIEW OF SYSTEMS:  See HPI, otherwise complete 10 point review of systems negative    [ ] All others negative	    PHYSICAL EXAM:  T(C): 36.6 (04-15-19 @ 07:49), Max: 37 (04-15-19 @ 06:00)  HR: 62 (04-15-19 @ 07:49) (61 - 64)  BP: 142/64 (04-15-19 @ 07:49) (142/64 - 151/67)  RR: 18 (04-15-19 @ 07:49) (15 - 18)  SpO2: 96% (04-15-19 @ 07:49) (96% - 100%)  Wt(kg): --  I&O's Summary      Appearance: No Acute Distress	  HEENT:  Normal oral mucosa, PERRL, EOMI	  Cardiovascular: Normal S1 S2, No JVD, No murmurs/rubs/gallops  Respiratory: Lungs clear to auscultation bilaterally  Gastrointestinal:  Soft, Non-tender, + BS	  Skin: No rashes, No ecchymoses, No cyanosis	  Neurologic: Non-focal  Extremities: No clubbing, cyanosis or edema  Vascular: Peripheral pulses palpable 2+ bilaterally  Psychiatry: A & O x 3, Mood & affect appropriate    Laboratory Data:	 	    CBC Full  -  ( 15 Apr 2019 01:30 )  WBC Count : 18.90 K/uL  Hemoglobin : 15.0 g/dL  Hematocrit : 45.6 %  Platelet Count - Automated : 185 K/uL  Mean Cell Volume : 81.4 fL  Mean Cell Hemoglobin : 26.8 pg  Mean Cell Hemoglobin Concentration : 32.9 %  Auto Neutrophil # : 16.74 K/uL  Auto Lymphocyte # : 0.81 K/uL  Auto Monocyte # : 1.12 K/uL  Auto Eosinophil # : 0.10 K/uL  Auto Basophil # : 0.05 K/uL  Auto Neutrophil % : 88.6 %  Auto Lymphocyte % : 4.3 %  Auto Monocyte % : 5.9 %  Auto Eosinophil % : 0.5 %  Auto Basophil % : 0.3 %    04-15    137  |  102  |  25<H>  ----------------------------<  178<H>  4.1   |  24  |  0.97    Ca    9.1      15 Apr 2019 01:30    TPro  7.2  /  Alb  4.2  /  TBili  0.6  /  DBili  x   /  AST  32  /  ALT  37  /  AlkPhos  80  04-15      Interpretation of Telemetry: nsr	    ECG:  nsr irbbb lafb	  	    Assessment:  -syncope  -hx of NSTEMI/CAD s/p prior stents (most recently to dRCA in 10/2017)  -GI illness  -bifasicular block  -HTN    Recs:  -suspect syncope vasovagal  in setting of GI sx vs orhthostatics given context of diarrhea and poor PO intake. although cardiac syncope also in differential given bifasicular block and hx of NSTEMI. EP consulted for further recs  -c/w tele  -c/w dapt and statin and toprol for now  -plan for ischemic eval with LHC per outpatient cardiologist given prior hx of NSTEMI in setting of presenting with vague GI sx and now with recurrent GI sx and unexplained syncope      Greater than 60 minutes spent on total encounter; more than 50% of the visit was spent counseling and/or coordinating care by the attending physician.   	  Donnie Preciado MD   Cardiovascular Diseases  [phone]

## 2019-04-15 NOTE — ED PROVIDER NOTE - OBJECTIVE STATEMENT
68M with hx of CAD with stents p/w episode of syncope x2. patient woke up with nasuea and went to the bathroom feeling like he needed to vomit and had a loose BM when he passed out. patient was eased to the floor by his wife. family woke him and helped him stand up but his legs felt weak and had a second episode of the same. EMS called and brought to ED. currently feeling better. no cp, sob. 68M with hx of CAD with stents p/w episode of syncope x2. patient woke up with nausea and went to the bathroom feeling like he needed to vomit and had a loose BM when he passed out. patient was eased to the floor by his wife. family woke him and helped him stand up but his legs felt weak and had a second episode of the same but not while sitting on toilet. EMS called and brought to ED. currently feeling better. no cp, sob.

## 2019-04-15 NOTE — ED ADULT NURSE NOTE - PMH
Benign prostatic hypertrophy    CAD (coronary artery disease)    Coronary artery disease    HTN (hypertension)    Myocardial infarction acute  anterior wall 2009  Stented coronary artery  [place] x2 RCA 2009 - [facility]

## 2019-04-15 NOTE — ED ADULT NURSE NOTE - OBJECTIVE STATEMENT
break coverage RN- pt s/p 2 witnessed syncopal episodes- as per pt and family at bedside pt became nauseous and felt like he had to have a BM, walked to the bathroom and became diaphoretic, dizziness and had LOC, caught by wife, did not fall to floor, pt was lowered to the floor woke up and had LOC again. pt arrives to room 7 awake, a/ox3, vitals as noted, ambulatory prior to arrival with EMS, 18g in place by EMS, 20g placed to right AC blood work sent. pt currently denies any CP, SOB, n/v, dizziness, weakness, visual changes, HA, pending EDMD evaluation, will continue to monitor

## 2019-04-15 NOTE — CONSULT NOTE ADULT - ASSESSMENT
68 year old male with a PMH of CAD, MI with stents x2 RCA in 2009, RCA stent x1 in 2017, HTN, HLD, BPH presented to ED after two syncopal episodes last night.  Syncope in the setting of nausea/diarrhea most likely vasovagal syncope etiology.  His baseline EKG demonstrated mild bifascicular block manifest as LAFB and incomplete RBBB (QRS 98ms).  So far no evidence of AV block or symptomatic bradycardia seen.   Negative orthostatic hypotension noted.   -Continue telemetry monitoring for symptomatic bradycardia  -Echocardiogram   -Continue beta-blocker therapy  -No acute pacing   -Will follow up

## 2019-04-15 NOTE — H&P ADULT - NSICDXPASTMEDICALHX_GEN_ALL_CORE_FT
PAST MEDICAL HISTORY:  Benign prostatic hypertrophy     CAD (coronary artery disease)     Coronary artery disease     HTN (hypertension)     Myocardial infarction acute anterior wall 2009    Stented coronary artery endeavor x2 RCA 2009 - [facility]

## 2019-04-15 NOTE — H&P ADULT - NEGATIVE MUSCULOSKELETAL SYMPTOMS
no joint swelling/no arthritis/no myalgia/no arthralgia (0) swallows foods/liquids without difficulty

## 2019-04-15 NOTE — ED ADULT TRIAGE NOTE - CHIEF COMPLAINT QUOTE
alert no distress  had 2 witnessed syncopal episodes  after having BM  no injury noted   no distress or c/o at present  left 18 g heplock inserted by EMS arrives with O2 2LPM

## 2019-04-15 NOTE — CONSULT NOTE ADULT - SUBJECTIVE AND OBJECTIVE BOX
Patient is a 68y old  Male who presents with a chief complaint of syncope (15 Apr 2019 08:16)          HPI:    68 year old male with a PMH of CAD, MI with stents x2 RCA in 2009, RCA stent x1 in 2017, HTN, HLD, BPH presented to ED after two syncopal episodes last night.  Patient described prior to the syncopal event he was "very nauseous but unable to vomit" around 9 pm yesterday.  He had multiple watery diarrhea, became diaphoretic while in the bathroom.  He subsequently loss of consciousness for <1 minutes.  His spouse assisted him to get up but he had LOC again for a few seconds.  EMS arrived shortly and administered antiemetic med via IV, patient reportedly recovered with stable vital signs.  He reports additional diarrhea up to 3-4 times since arrived in ED.  He denies any prior syncope.  He has been on Metoprolol since 2009.  EKG demonstrated NSR 64 bpm with left axis and incomplete RBBB with QRS 98 ms.  Telemetry recording demonstrated HR trend in 57-low 60 bpm without evidence of significant bradycardia or AV block.  His previous cath report showed preserved LVEF in 2017.     Patient denies CP/SOB/ palpitations.  He is scheduled for left heart catheterization today.         PAST MEDICAL & SURGICAL HISTORY:  Stented coronary artery: endeavor x2 RCA 2009 - [facility]  CAD (coronary artery disease)  Myocardial infarction acute: anterior wall 2009  HTN (hypertension)  Benign prostatic hypertrophy  Coronary artery disease  No significant past surgical history      MEDICATIONS  (STANDING):  aspirin enteric coated 81 milliGRAM(s) Oral daily  atorvastatin 40 milliGRAM(s) Oral at bedtime  clopidogrel Tablet 75 milliGRAM(s) Oral daily  finasteride 5 milliGRAM(s) Oral daily  heparin  Injectable 5000 Unit(s) SubCutaneous every 12 hours  metoprolol succinate ER 50 milliGRAM(s) Oral daily  multivitamin 1 Tablet(s) Oral daily  sodium chloride 0.9% lock flush 3 milliLiter(s) IV Push every 8 hours  sodium chloride 0.9%. 1000 milliLiter(s) (70 mL/Hr) IV Continuous <Continuous>    MEDICATIONS  (PRN):    Allergies    No Known Allergies    Intolerances      FAMILY HISTORY:  Family history of stent: mother living with coronary stents placed at 81yo      SOCIAL HISTORY:  Denies smoking; no   Alcohol  or  Drug abuse       REVIEW OF SYSTEMS:    CONSTITUTIONAL: No fever, weight loss, chills, shakes, or fatigue  EYES: No eye pain, visual disturbances, or discharge  ENMT:  No difficulty hearing, tinnitus, vertigo; No sinus or throat pain  NECK: No pain or stiffness  RESPIRATORY: No cough, wheezing, hemoptysis, or shortness of breath  CARDIOVASCULAR: No chest pain, dyspnea, palpitations,  paroxysmal nocturnal dyspnea, orthopnea, or arm or leg swelling  +dizziness, syncope,  GASTROINTESTINAL: No abdominal  or epigastric pain,  vomiting, hematemesis,  constipation, melena or bright red blood.  +diarrhea, +nausea,  GENITOURINARY: No dysuria, nocturia, hematuria, or urinary incontinence  NEUROLOGICAL: No headaches, memory loss, slurred speech, limb weakness, loss of strength, numbness, or tremors  SKIN: No itching, burning, rashes, or lesions   LYMPH NODES: No enlarged glands  ENDOCRINE: No heat or cold intolerance, or hair loss  MUSCULOSKELETAL: No joint pain or swelling, muscle, back, or extremity pain  PSYCHIATRIC: No depression, anxiety, or difficulty sleeping  HEME/LYMPH: No easy bruising or bleeding gums  ALLERY AND IMMUNOLOGIC: No hives or rash.      Vital Signs Last 24 Hrs  T(C): 36.6 (15 Apr 2019 08:21), Max: 37 (15 Apr 2019 06:00)  T(F): 97.8 (15 Apr 2019 08:21), Max: 98.6 (15 Apr 2019 06:00)  HR: 63 (15 Apr 2019 08:21) (61 - 64)  BP: 139/61 (15 Apr 2019 08:21) (139/61 - 151/67)    BP(mean): --  RR: 16 (15 Apr 2019 08:21) (15 - 18)  SpO2: 100% (15 Apr 2019 08:21) (96% - 100%)    PHYSICAL EXAM:    GENERAL: In no apparent distress, well nourished, and hydrated.  HEAD:  Atraumatic, Normocephalic  NECK: Supple . No JVD or carotid bruit or thyroidmegaly.  Carotid pulse is 2+ bilaterally.  PULMONARY: Clear to auscultation and perfusion.  No rales, wheezing, or rhonchi bilaterally.  HEART: Regular rate and rhythm; No murmurs, rubs, or gallops.  ABDOMEN: Soft, Nontender, Nondistended; Bowel sounds present  EXTREMITIES: No clubbing, cyanosis, or edema  NEUROLOGICAL: Alert oriented to person, place and time.  Speech clear.  Skin: Dry intact, no rashes or lesions.          INTERPRETATION OF TELEMETRY:  Sinus rhythm with occasional PVC's/APC's    ECG:         LABS:                        15.0   18.90 )-----------( 185      ( 15 Apr 2019 01:30 )             45.6     04-15    137  |  102  |  25<H>  ----------------------------<  178<H>  4.1   |  24  |  0.97    Ca    9.1      15 Apr 2019 01:30    TPro  7.2  /  Alb  4.2  /  TBili  0.6  /  DBili  x   /  AST  32  /  ALT  37  /  AlkPhos  80  04-15        PT/INR - ( 15 Apr 2019 01:30 )   PT: 10.9 SEC;   INR: 0.98          PTT - ( 15 Apr 2019 01:30 )  PTT:26.1 SEC      BNP  RADIOLOGY & ADDITIONAL STUDIES:  PREVIOUS DIAGNOSTIC TESTING:      ECHO FINDINGS:    STRESS FINDINGS:    CATHETERIZATION FINDINGS:

## 2019-04-15 NOTE — H&P ADULT - PROBLEM SELECTOR PLAN 1
Admit to tele  check cbc, bmp, a1c, flp, tsh, trend CE  Echo ordered  Fall risk  check orthostatics  Dr. Griffith to follow for cards  Discussed with Dr. Roche

## 2019-04-15 NOTE — H&P ADULT - HISTORY OF PRESENT ILLNESS
67 y/o M with hx of CAD with stents, HTN, HLD presents with syncope. Patient states he had an episode fo nausea then had an episode of loose BM. He then had a episode of LOC. His wife caught him; therefore, he did not have any trauma. Patient was back to his baseline within few seconds. When he tried to get up and his legs felt weak and patient had an episode where he almost passed out. Denies fever, chills, cough, chest pain, abdominal pain, vomiting, hematemesis, constipation, melena hematochezia  or LE edema.     No sick contacts or other family members with diarrhea. He also denies to eating out. Patient did mention his dog also has diarrhea. barby had multiple episodes of diarrhea since arriving to the ED 69 y/o M with hx of CAD with stents, HTN, HLD presents with syncope. Patient states he had an episode fo nausea then had an episode of loose BM. He then had a episode of LOC  (according to son, was out for a little while and Dtr had to slap him to wake up). His wife caught him; therefore, he did not have any trauma. Patient was back to his baseline within few seconds. When he tried to get up and his legs felt weak and patient had an episode where he almost passed out again .   Denies fever, chills, cough, chest pain, abdominal pain, vomiting, hematemesis, constipation, melena hematochezia  or LE edema.     No sick contacts or other family members with diarrhea. He also denies to eating out. Patient did mention his dog also has diarrhea. 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

## 2019-04-16 LAB
CHOLEST SERPL-MCNC: 112 MG/DL — LOW (ref 120–199)
HBA1C BLD-MCNC: 7 % — HIGH (ref 4–5.6)
HCV AB S/CO SERPL IA: 0.14 S/CO — SIGNIFICANT CHANGE UP (ref 0–0.99)
HCV AB SERPL-IMP: SIGNIFICANT CHANGE UP
HDLC SERPL-MCNC: 39 MG/DL — SIGNIFICANT CHANGE UP (ref 35–55)
LIPID PNL WITH DIRECT LDL SERPL: 67 MG/DL — SIGNIFICANT CHANGE UP
SPECIMEN SOURCE: SIGNIFICANT CHANGE UP
SPECIMEN SOURCE: SIGNIFICANT CHANGE UP
TRIGL SERPL-MCNC: 98 MG/DL — SIGNIFICANT CHANGE UP (ref 10–149)
TSH SERPL-MCNC: 3.11 UIU/ML — SIGNIFICANT CHANGE UP (ref 0.27–4.2)

## 2019-04-16 PROCEDURE — 99232 SBSQ HOSP IP/OBS MODERATE 35: CPT

## 2019-04-16 PROCEDURE — 93010 ELECTROCARDIOGRAM REPORT: CPT

## 2019-04-16 PROCEDURE — 93010 ELECTROCARDIOGRAM REPORT: CPT | Mod: 77

## 2019-04-16 RX ORDER — LEVOTHYROXINE SODIUM 125 MCG
125 TABLET ORAL DAILY
Qty: 0 | Refills: 0 | Status: DISCONTINUED | OUTPATIENT
Start: 2019-04-16 | End: 2019-04-17

## 2019-04-16 RX ORDER — LISINOPRIL 2.5 MG/1
5 TABLET ORAL DAILY
Qty: 0 | Refills: 0 | Status: DISCONTINUED | OUTPATIENT
Start: 2019-04-16 | End: 2019-04-17

## 2019-04-16 RX ORDER — PANTOPRAZOLE SODIUM 20 MG/1
40 TABLET, DELAYED RELEASE ORAL
Qty: 0 | Refills: 0 | Status: DISCONTINUED | OUTPATIENT
Start: 2019-04-16 | End: 2019-04-17

## 2019-04-16 RX ORDER — SIMETHICONE 80 MG/1
80 TABLET, CHEWABLE ORAL ONCE
Qty: 0 | Refills: 0 | Status: COMPLETED | OUTPATIENT
Start: 2019-04-16 | End: 2019-04-16

## 2019-04-16 RX ORDER — ACETAMINOPHEN 500 MG
650 TABLET ORAL EVERY 6 HOURS
Qty: 0 | Refills: 0 | Status: DISCONTINUED | OUTPATIENT
Start: 2019-04-16 | End: 2019-04-17

## 2019-04-16 RX ORDER — SODIUM CHLORIDE 9 MG/ML
500 INJECTION INTRAMUSCULAR; INTRAVENOUS; SUBCUTANEOUS
Qty: 0 | Refills: 0 | Status: DISCONTINUED | OUTPATIENT
Start: 2019-04-16 | End: 2019-04-17

## 2019-04-16 RX ADMIN — Medication 81 MILLIGRAM(S): at 10:14

## 2019-04-16 RX ADMIN — Medication 30 MILLILITER(S): at 14:03

## 2019-04-16 RX ADMIN — SODIUM CHLORIDE 3 MILLILITER(S): 9 INJECTION INTRAMUSCULAR; INTRAVENOUS; SUBCUTANEOUS at 15:20

## 2019-04-16 RX ADMIN — Medication 650 MILLIGRAM(S): at 15:51

## 2019-04-16 RX ADMIN — SODIUM CHLORIDE 75 MILLILITER(S): 9 INJECTION INTRAMUSCULAR; INTRAVENOUS; SUBCUTANEOUS at 14:03

## 2019-04-16 RX ADMIN — ATORVASTATIN CALCIUM 40 MILLIGRAM(S): 80 TABLET, FILM COATED ORAL at 21:18

## 2019-04-16 RX ADMIN — Medication 50 MILLIGRAM(S): at 06:05

## 2019-04-16 RX ADMIN — Medication 650 MILLIGRAM(S): at 16:45

## 2019-04-16 RX ADMIN — SODIUM CHLORIDE 3 MILLILITER(S): 9 INJECTION INTRAMUSCULAR; INTRAVENOUS; SUBCUTANEOUS at 06:06

## 2019-04-16 RX ADMIN — Medication 125 MICROGRAM(S): at 06:05

## 2019-04-16 RX ADMIN — PANTOPRAZOLE SODIUM 40 MILLIGRAM(S): 20 TABLET, DELAYED RELEASE ORAL at 06:06

## 2019-04-16 RX ADMIN — LISINOPRIL 5 MILLIGRAM(S): 2.5 TABLET ORAL at 17:03

## 2019-04-16 RX ADMIN — SIMETHICONE 80 MILLIGRAM(S): 80 TABLET, CHEWABLE ORAL at 17:03

## 2019-04-16 RX ADMIN — SODIUM CHLORIDE 3 MILLILITER(S): 9 INJECTION INTRAMUSCULAR; INTRAVENOUS; SUBCUTANEOUS at 22:38

## 2019-04-16 RX ADMIN — FINASTERIDE 5 MILLIGRAM(S): 5 TABLET, FILM COATED ORAL at 21:18

## 2019-04-16 RX ADMIN — CLOPIDOGREL BISULFATE 75 MILLIGRAM(S): 75 TABLET, FILM COATED ORAL at 10:14

## 2019-04-16 NOTE — PROGRESS NOTE ADULT - SUBJECTIVE AND OBJECTIVE BOX
Cardiovascular Disease Progress Note    Overnight events: No acute events overnight.  gi sx resolved. no cp/sob/palps/dizziness  Otherwise review of systems negative    Objective Findings:  T(C): 36.4 (19 @ 06:07), Max: 36.9 (04-15-19 @ 15:28)  HR: 64 (19 @ 06:07) (56 - 64)  BP: 160/83 (19 @ 06:07) (139/61 - 160/83)  RR: 18 (19 @ 06:07) (16 - 18)  SpO2: 100% (19 @ 06:07) (97% - 100%)  Wt(kg): --  Daily Height in cm: 170.18 (15 Apr 2019 18:22)    Daily Weight in k.2 (15 Apr 2019 18:22)      Physical Exam:  Gen: NAD  HEENT: EOMI  CV: RRR, normal S1 + S2, no m/r/g  Lungs: CTAB  Abd: soft, non-tender  Ext: No edema    Telemetry: nsr    Laboratory Data:                        14.0   11.30 )-----------( 176      ( 15 Apr 2019 12:50 )             43.3     04-15    139  |  105  |  19  ----------------------------<  148<H>  4.2   |  23  |  0.92    Ca    9.4      15 Apr 2019 12:50  Phos  3.3     04-15  Mg     2.0     -15    TPro  7.2  /  Alb  4.2  /  TBili  0.6  /  DBili  x   /  AST  32  /  ALT  37  /  AlkPhos  80  04-15    PT/INR - ( 15 Apr 2019 01:30 )   PT: 10.9 SEC;   INR: 0.98          PTT - ( 15 Apr 2019 01:30 )  PTT:26.1 SEC  CARDIAC MARKERS ( 15 Apr 2019 12:50 )  x     / x     / 178 u/L / 5.28 ng/mL / x              Inpatient Medications:  MEDICATIONS  (STANDING):  aspirin enteric coated 81 milliGRAM(s) Oral daily  atorvastatin 40 milliGRAM(s) Oral at bedtime  clopidogrel Tablet 75 milliGRAM(s) Oral daily  finasteride 5 milliGRAM(s) Oral daily  heparin  Injectable 5000 Unit(s) SubCutaneous every 12 hours  levothyroxine 125 MICROGram(s) Oral daily  metoprolol succinate ER 50 milliGRAM(s) Oral daily  multivitamin 1 Tablet(s) Oral daily  pantoprazole    Tablet 40 milliGRAM(s) Oral before breakfast  sodium chloride 0.9% lock flush 3 milliLiter(s) IV Push every 8 hours  sodium chloride 0.9%. 1000 milliLiter(s) (70 mL/Hr) IV Continuous <Continuous>      Assessment:  -syncope  -hx of NSTEMI/CAD s/p prior stents (most recently to dRCA in 10/2017)  -GI illness  -bifasicular block  -HTN    Recs:  -suspect syncope vasovagal  in setting of GI sx vs orhthostatics given context of diarrhea and poor PO intake. although cardiac syncope also in differential given bifasicular block and hx of NSTEMI. EP recs appreciated  -c/w tele  -tte with evidence of LVH and BP elevated here. would start lisinopril 5mg daily  -c/w dapt and statin and toprol   -plan for ischemic eval with LHC given prior hx of NSTEMI in setting of presenting with vague GI sx and now with recurrent GI sx and unexplained syncope        Over 25 minutes spent on total encounter; more than 50% of the visit was spent counseling and/or coordinating care by the attending physician.      Donnie Preciado MD   Cardiovascular Disease  [phone]

## 2019-04-16 NOTE — CHART NOTE - NSCHARTNOTEFT_GEN_A_CORE
Patient s/p cardiac cath via right radial access. Dressing C/D/I, no hematoma, no bleeding, + radial pulse, < 2 sec capillary refill, hand warm, + ROM. Of note patient c/o chest discomfort unable to describe, states it feels like GERD or maybe gas. Patient c/o of same discomfort in cath lab post cath, patient received Maalox and repeat EKG unchanged. Will give simethicone x 1 dose and continue to evaluate, Tele with no events, VSS.

## 2019-04-16 NOTE — PROGRESS NOTE ADULT - SUBJECTIVE AND OBJECTIVE BOX
Patient seen post cardiac cath, s/p stent to pLAD.  Three stents patent. Tolerated the procedure well.  No complaints or complications. Denies chest pain, shortness of breath, palpitations or lightheadedness. No bowel movement for > 20 hours.  No abdominal pain, nausea or vomiting. Currently in normal sinus rhythm 60 bpm with /78 bpm.     Vital Signs Last 24 Hrs  T(C): 36.7 (16 Apr 2019 15:57), Max: 36.7 (15 Apr 2019 18:22)  T(F): 98 (16 Apr 2019 15:57), Max: 98 (15 Apr 2019 18:22)  HR: 62 (16 Apr 2019 08:59) (56 - 64)  BP: 172/84 (16 Apr 2019 08:59) (148/79 - 172/84)  BP(mean): --  RR: 17 (16 Apr 2019 15:57) (17 - 18)  SpO2: 99% (16 Apr 2019 15:57) (98% - 100%)      EKG  Telemetry: Normal sinus rhythm 60 bpm. No events over night  MEDICATIONS  (STANDING):  aspirin enteric coated 81 milliGRAM(s) Oral daily  atorvastatin 40 milliGRAM(s) Oral at bedtime  clopidogrel Tablet 75 milliGRAM(s) Oral daily  finasteride 5 milliGRAM(s) Oral daily  heparin  Injectable 5000 Unit(s) SubCutaneous every 12 hours  levothyroxine 125 MICROGram(s) Oral daily  lisinopril 5 milliGRAM(s) Oral daily  metoprolol succinate ER 50 milliGRAM(s) Oral daily  multivitamin 1 Tablet(s) Oral daily  pantoprazole    Tablet 40 milliGRAM(s) Oral before breakfast  sodium chloride 0.9% lock flush 3 milliLiter(s) IV Push every 8 hours  sodium chloride 0.9%. 1000 milliLiter(s) (70 mL/Hr) IV Continuous <Continuous>  sodium chloride 0.9%. 500 milliLiter(s) (75 mL/Hr) IV Continuous <Continuous>    MEDICATIONS  (PRN):  acetaminophen   Tablet .. 650 milliGRAM(s) Oral every 6 hours PRN Mild Pain (1 - 3)          Physical exam:   Gen- well developed well nourished  Resp- clesr to auscultation.  No wheezing, rales or rhonchi  CV- S1 and S2 RRR. No murmurs, gallops or rubs  ABD- soft nontender + bowel sounds  EXT- right radial band with no evidence of bleeding.  No wrist pain.  Digits warm to touch.  No lower leg edema  Neuro:  Grossly nonfocal                            14.0   11.30 )-----------( 176      ( 15 Apr 2019 12:50 )             43.3     PT/INR - ( 15 Apr 2019 01:30 )   PT: 10.9 SEC;   INR: 0.98          PTT - ( 15 Apr 2019 01:30 )  PTT:26.1 SEC  04-15    139  |  105  |  19  ----------------------------<  148<H>  4.2   |  23  |  0.92    Ca    9.4      15 Apr 2019 12:50  Phos  3.3     04-15  Mg     2.0     04-15    TPro  7.2  /  Alb  4.2  /  TBili  0.6  /  DBili  x   /  AST  32  /  ALT  37  /  AlkPhos  80  04-15    CARDIAC MARKERS ( 15 Apr 2019 12:50 )  x     / x     / 178 u/L / 5.28 ng/mL / x

## 2019-04-17 ENCOUNTER — TRANSCRIPTION ENCOUNTER (OUTPATIENT)
Age: 69
End: 2019-04-17

## 2019-04-17 VITALS
HEART RATE: 64 BPM | SYSTOLIC BLOOD PRESSURE: 135 MMHG | OXYGEN SATURATION: 100 % | DIASTOLIC BLOOD PRESSURE: 72 MMHG | TEMPERATURE: 98 F | RESPIRATION RATE: 17 BRPM

## 2019-04-17 LAB
ANION GAP SERPL CALC-SCNC: 12 MMO/L — SIGNIFICANT CHANGE UP (ref 7–14)
BASOPHILS # BLD AUTO: 0.02 K/UL — SIGNIFICANT CHANGE UP (ref 0–0.2)
BASOPHILS NFR BLD AUTO: 0.2 % — SIGNIFICANT CHANGE UP (ref 0–2)
BUN SERPL-MCNC: 13 MG/DL — SIGNIFICANT CHANGE UP (ref 7–23)
CALCIUM SERPL-MCNC: 8.5 MG/DL — SIGNIFICANT CHANGE UP (ref 8.4–10.5)
CHLORIDE SERPL-SCNC: 104 MMOL/L — SIGNIFICANT CHANGE UP (ref 98–107)
CO2 SERPL-SCNC: 23 MMOL/L — SIGNIFICANT CHANGE UP (ref 22–31)
CREAT SERPL-MCNC: 0.87 MG/DL — SIGNIFICANT CHANGE UP (ref 0.5–1.3)
EOSINOPHIL # BLD AUTO: 0.19 K/UL — SIGNIFICANT CHANGE UP (ref 0–0.5)
EOSINOPHIL NFR BLD AUTO: 2 % — SIGNIFICANT CHANGE UP (ref 0–6)
GLUCOSE SERPL-MCNC: 148 MG/DL — HIGH (ref 70–99)
HCT VFR BLD CALC: 43.1 % — SIGNIFICANT CHANGE UP (ref 39–50)
HGB BLD-MCNC: 14.2 G/DL — SIGNIFICANT CHANGE UP (ref 13–17)
IMM GRANULOCYTES NFR BLD AUTO: 0.4 % — SIGNIFICANT CHANGE UP (ref 0–1.5)
LYMPHOCYTES # BLD AUTO: 1.45 K/UL — SIGNIFICANT CHANGE UP (ref 1–3.3)
LYMPHOCYTES # BLD AUTO: 15.4 % — SIGNIFICANT CHANGE UP (ref 13–44)
MAGNESIUM SERPL-MCNC: 2.1 MG/DL — SIGNIFICANT CHANGE UP (ref 1.6–2.6)
MCHC RBC-ENTMCNC: 27.2 PG — SIGNIFICANT CHANGE UP (ref 27–34)
MCHC RBC-ENTMCNC: 32.9 % — SIGNIFICANT CHANGE UP (ref 32–36)
MCV RBC AUTO: 82.6 FL — SIGNIFICANT CHANGE UP (ref 80–100)
MONOCYTES # BLD AUTO: 0.67 K/UL — SIGNIFICANT CHANGE UP (ref 0–0.9)
MONOCYTES NFR BLD AUTO: 7.1 % — SIGNIFICANT CHANGE UP (ref 2–14)
NEUTROPHILS # BLD AUTO: 7.04 K/UL — SIGNIFICANT CHANGE UP (ref 1.8–7.4)
NEUTROPHILS NFR BLD AUTO: 74.9 % — SIGNIFICANT CHANGE UP (ref 43–77)
NRBC # FLD: 0 K/UL — SIGNIFICANT CHANGE UP (ref 0–0)
PHOSPHATE SERPL-MCNC: 2.4 MG/DL — LOW (ref 2.5–4.5)
PLATELET # BLD AUTO: 170 K/UL — SIGNIFICANT CHANGE UP (ref 150–400)
PMV BLD: 11.1 FL — SIGNIFICANT CHANGE UP (ref 7–13)
POTASSIUM SERPL-MCNC: 3.7 MMOL/L — SIGNIFICANT CHANGE UP (ref 3.5–5.3)
POTASSIUM SERPL-SCNC: 3.7 MMOL/L — SIGNIFICANT CHANGE UP (ref 3.5–5.3)
RBC # BLD: 5.22 M/UL — SIGNIFICANT CHANGE UP (ref 4.2–5.8)
RBC # FLD: 13.3 % — SIGNIFICANT CHANGE UP (ref 10.3–14.5)
SODIUM SERPL-SCNC: 139 MMOL/L — SIGNIFICANT CHANGE UP (ref 135–145)
WBC # BLD: 9.41 K/UL — SIGNIFICANT CHANGE UP (ref 3.8–10.5)
WBC # FLD AUTO: 9.41 K/UL — SIGNIFICANT CHANGE UP (ref 3.8–10.5)

## 2019-04-17 RX ORDER — SIMETHICONE 80 MG/1
80 TABLET, CHEWABLE ORAL ONCE
Qty: 0 | Refills: 0 | Status: COMPLETED | OUTPATIENT
Start: 2019-04-17 | End: 2019-04-17

## 2019-04-17 RX ORDER — METOPROLOL TARTRATE 50 MG
1 TABLET ORAL
Qty: 0 | Refills: 0 | COMMUNITY

## 2019-04-17 RX ORDER — LISINOPRIL 2.5 MG/1
1 TABLET ORAL
Qty: 30 | Refills: 0
Start: 2019-04-17 | End: 2019-05-16

## 2019-04-17 RX ORDER — METOPROLOL TARTRATE 50 MG
1 TABLET ORAL
Qty: 30 | Refills: 0
Start: 2019-04-17 | End: 2019-05-16

## 2019-04-17 RX ORDER — LEVOTHYROXINE SODIUM 125 MCG
1 TABLET ORAL
Qty: 30 | Refills: 0
Start: 2019-04-17 | End: 2019-05-16

## 2019-04-17 RX ORDER — PANTOPRAZOLE SODIUM 20 MG/1
1 TABLET, DELAYED RELEASE ORAL
Qty: 30 | Refills: 0
Start: 2019-04-17 | End: 2019-05-16

## 2019-04-17 RX ADMIN — FINASTERIDE 5 MILLIGRAM(S): 5 TABLET, FILM COATED ORAL at 11:16

## 2019-04-17 RX ADMIN — Medication 1 TABLET(S): at 11:16

## 2019-04-17 RX ADMIN — SIMETHICONE 80 MILLIGRAM(S): 80 TABLET, CHEWABLE ORAL at 09:06

## 2019-04-17 RX ADMIN — SODIUM CHLORIDE 3 MILLILITER(S): 9 INJECTION INTRAMUSCULAR; INTRAVENOUS; SUBCUTANEOUS at 06:49

## 2019-04-17 RX ADMIN — Medication 50 MILLIGRAM(S): at 06:49

## 2019-04-17 RX ADMIN — Medication 81 MILLIGRAM(S): at 11:17

## 2019-04-17 RX ADMIN — LISINOPRIL 5 MILLIGRAM(S): 2.5 TABLET ORAL at 11:17

## 2019-04-17 RX ADMIN — CLOPIDOGREL BISULFATE 75 MILLIGRAM(S): 75 TABLET, FILM COATED ORAL at 11:17

## 2019-04-17 RX ADMIN — Medication 125 MICROGRAM(S): at 06:49

## 2019-04-17 RX ADMIN — PANTOPRAZOLE SODIUM 40 MILLIGRAM(S): 20 TABLET, DELAYED RELEASE ORAL at 06:49

## 2019-04-17 NOTE — DISCHARGE NOTE PROVIDER - CARE PROVIDER_API CALL
Donnie Preciado)  Internal Medicine  [id number] [address] [place] [address]  Phone: [phone]  Fax: [phone]  Follow Up Time:

## 2019-04-17 NOTE — DISCHARGE NOTE PROVIDER - HOSPITAL COURSE
67 y/o M with PMHx of CAD with stent, HTN presented with two syncopal episodes and episodes of diarrhea/nausea. EKG: NSR @ 74 bpm, TWI in AVR, V1, III, Flat T in AVF. hsTrop: 12 -->13. Orthostatics negative. Syncope likely vasovagal in the setting of GI symptoms. Patient received IVF for hydration. Diarrhea likely viral gastroenteritis. Stool studies not sent as diarrhea resolved spontaneously. Leukocytosis resolved. Echocardiogram revealed concentric LV remodeling, normal LV systolic function, no segmental wall motion abnormalities. LHC: pLAD 80% x1 ABHAY, LCx luminal dz, RCA stent patent, EF 60%. Right wrist stable post procedure. EP was called as his baseline EKG demonstrated mild bifascicular block manifest as LAFB and incomplete RBBB (QRS 98ms) but no evidence of AV block or symptomatic bradycardia on telemetry. EP recommends to continue beta blocker therapy.    Case discussed with Dr. Roche and patient is medically stable for discharge home. 69 y/o M with PMHx of CAD with stent, HTN presented with two syncopal episodes and episodes of diarrhea/nausea. EKG: NSR @ 74 bpm, TWI in AVR, V1, III, Flat T in AVF. hsTrop: 12 -->13. Orthostatics negative. Syncope likely vasovagal in the setting of GI symptoms. Patient received IVF for hydration. Diarrhea likely viral gastroenteritis. Stool studies not sent as diarrhea resolved spontaneously. Leukocytosis resolved. Echocardiogram revealed concentric LV remodeling, normal LV systolic function, no segmental wall motion abnormalities. LHC: pLAD 80% x1 ABHYA, LCx luminal dz, RCA stent patent, EF 60%. Right wrist stable post procedure. EP was called as his baseline EKG demonstrated mild bifascicular block manifest as LAFB and incomplete RBBB (QRS 98ms) but no evidence of AV block or symptomatic bradycardia on telemetry. EP recommends to continue beta blocker therapy.    Case discussed with Dr. Roche and patient is medically stable for discharge home.

## 2019-04-17 NOTE — DISCHARGE NOTE PROVIDER - NSDCCAREPROVSEEN_GEN_ALL_CORE_FT
Zvi Dubin Zvi Dubin Layne Weinman Hoorbod Delshadfar Hoorbod Delshadfar Hoorbod Delshadfar  [facility] Medicine  User ADM  Cristobal Lara  Ordering Physician

## 2019-04-17 NOTE — DISCHARGE NOTE PROVIDER - NSDCCPCAREPLAN_GEN_ALL_CORE_FT
PRINCIPAL DISCHARGE DIAGNOSIS  Diagnosis: Syncope  Assessment and Plan of Treatment: You underwent cardiac catheterization and received 1 stent to pLAD. Continue Aspirin, Plavix, Toprol XL, Lisinopril, and Atorvastatin as prescribed. Syncope likely also related to diarrhea and you received IV fluids for hydration. Follow up with your primary care physician within 1-2 weeks. Follow up with Dr. Preciado (Cardiologist) within 1-2 weeks; call [phone] for appointment.      SECONDARY DISCHARGE DIAGNOSES  Diagnosis: Diarrhea  Assessment and Plan of Treatment: Resolved. Maintain adequate hydration. Follow up with your primary care physician within 1-2 weeks.

## 2019-04-17 NOTE — DISCHARGE NOTE NURSING/CASE MANAGEMENT/SOCIAL WORK - NSDCDPATPORTLINK_GEN_ALL_CORE
You can access the [employer] [facility] Your Passion[facility] Patient Portal, offered by [facility], by registering with the following website: [email or web address]

## 2019-04-17 NOTE — PROGRESS NOTE ADULT - ASSESSMENT
M E D I C A L   A T T E N D I N G    F O L L O W    U P   N O T E                                     ------------------------------------------------------------------------------------------------    patient evaluated by me, case discussed with team, chart, medications, and physical exam reviewed, labs / tests  and vitals reviewed by me, as bellow.   Patient is stable for discharge today. pt overall doing well, ambulating, no GI or cardiac symptoms. had a long conversation with pt and family and all questions answered ...   Patient to follow up with  Dr Preciado   See discharge document for full note.      ==================>> MEDICATIONS <<====================    aspirin enteric coated 81 milliGRAM(s) Oral daily  atorvastatin 40 milliGRAM(s) Oral at bedtime  clopidogrel Tablet 75 milliGRAM(s) Oral daily  finasteride 5 milliGRAM(s) Oral daily  heparin  Injectable 5000 Unit(s) SubCutaneous every 12 hours  levothyroxine 125 MICROGram(s) Oral daily  lisinopril 5 milliGRAM(s) Oral daily  metoprolol succinate ER 50 milliGRAM(s) Oral daily  multivitamin 1 Tablet(s) Oral daily  pantoprazole    Tablet 40 milliGRAM(s) Oral before breakfast  sodium chloride 0.9% lock flush 3 milliLiter(s) IV Push every 8 hours  sodium chloride 0.9%. 1000 milliLiter(s) IV Continuous <Continuous>  sodium chloride 0.9%. 500 milliLiter(s) IV Continuous <Continuous>    MEDICATIONS  (PRN):  acetaminophen   Tablet .. 650 milliGRAM(s) Oral every 6 hours PRN Mild Pain (1 - 3)    ==================>> VITAL SIGNS <<==================    T(C): 36.3 (04-17-19 @ 06:45), Max: 36.7 (04-16-19 @ 15:57)  HR: 60 (04-17-19 @ 06:45) (58 - 60)  BP: 137/76 (04-17-19 @ 06:45) (134/76 - 151/85)  RR: 18 (04-17-19 @ 06:45) (17 - 18)  SpO2: 100% (04-17-19 @ 06:45) (97% - 100%)       ==================>> LAB AND IMAGING <<==================                        14.2   9.41  )-----------( 170      ( 17 Apr 2019 06:30 )             43.1        04-17    139  |  104  |  13  ----------------------------<  148<H>  3.7   |  23  |  0.87    Ca    8.5      17 Apr 2019 06:30  Phos  2.4     04-17  Mg     2.1     04-17    TSH:      3.11   (04-16-19)           Lipid profile:  (04-16-19)     Total: 112     LDL  : 67     HDL  :39     TG   :98     HgA1C: 7.0  (04-16-19)          WBC count:   9.41 <<== ,  11.30 <<== ,  18.90 <<==   Hemoglobin:   14.2 <<==,  14.0 <<==,  15.0 <<==  platelets:  170 <==, 176 <==, 185 <==    Creatinine:  0.87  <<==, 0.92  <<==, 0.97  <<==  Sodium:   139  <==, 139  <==, 137  <==       AST:          32 <==      ALT:        37  <==      AP:        80  <=     Bili:        0.6  <=         < from: Transthoracic Echocardiogram (04.15.19 @ 20:55) >  CONCLUSIONS:  1. Increased relative wall thickness with normal left  ventricular mass index, consistent with concentric left  ventricular remodeling.  2. Normal left ventricular systolic function. No segmental  wall motion abnormalities.  3. Normal right ventricular size and function.  4. Estimated right ventricular systolic pressure equals 13  mm Hg, assuming right atrial pressure equals 10 mm Hg,  consistent with normal pulmonary pressures.  *** No previous Echo exam.    < end of copied text >      < from: Cardiac Cath Lab - Adult (04.16.19 @ 11:12) >  CORONARY VESSELS: The coronary circulation is right dominant.  LM:--  LM: Angiography showed minor luminal irregularities with no flow  limiting lesions.  LAD:   --  Proximal LAD: There was a tubular 80 % stenosis. The lesion was  eccentric and moderately calcified.  CX:   --  Circumflex: The vessel was calcified. Angiography showed minor  luminal irregularities with no flow limiting lesions.  RCA:   --  RCA: Angiography showed minor luminal irregularities with no  flow limiting lesions.  COMPLICATIONS: No complications occurred during the cath lab visit.  DIAGNOSTIC IMPRESSIONS: Patient has patent stents in the RCA with  progressive disease of proximal LAD with normal LV function  DIAGNOSTIC RECOMMENDATIONS: PTCI of proximal LAD  INTERVENTIONAL IMPRESSIONS: PTCI of proximal LAD successfully performed as  described  INTERVENTIONAL RECOMMENDATIONS: ASA, plavix statin risk factor modification    < end of copied text >
_________________________________________________________________________________________  ========>>  M E D I C A L   A T T E N D I N G    F O L L O W  U P  N O T E  <<=========  -----------------------------------------------------------------------------------------------------    - Patient seen and examined by me earlier today.   - In summary,  GONZÁLEZ WHITLOCK is a 68y year old man who originally presented with syncope, dh  - Patient today overall doing ok, comfortable, eating OK.  No further diarrhea or other GI symptoms reported     ==================>> REVIEW OF SYSTEM <<=================    GEN: no fever, no chills, no pain  RESP: no SOB, no cough, no sputum  CVS: no chest pain, no palpitations, no edema  GI: no abdominal pain, no nausea, no constipation, no diarrhea  : no dysuria, no frequency, no hematuria  Neuro: no headache, no dizziness  Derm : no itching, no rash    ==================>> PHYSICAL EXAM <<=================    GEN: A&O X 3 , NAD , comfortable  HEENT: NCAT, PERRL, MMM, hearing intact  Neck: supple , no JVD  CVS: S1S2 , regular , No M/R/G appreciated  PULM: CTA B/L,  no W/R/R appreciated  ABD.: soft. non tender, non distended,  bowel sounds present  Extrem: intact pulses , no edema   PSYCH : normal mood,  not anxious      ==================>> MEDICATIONS <<====================    MEDICATIONS  (STANDING):  aspirin enteric coated 81 milliGRAM(s) Oral daily  atorvastatin 40 milliGRAM(s) Oral at bedtime  clopidogrel Tablet 75 milliGRAM(s) Oral daily  finasteride 5 milliGRAM(s) Oral daily  heparin  Injectable 5000 Unit(s) SubCutaneous every 12 hours  levothyroxine 125 MICROGram(s) Oral daily  metoprolol succinate ER 50 milliGRAM(s) Oral daily  multivitamin 1 Tablet(s) Oral daily  pantoprazole    Tablet 40 milliGRAM(s) Oral before breakfast  sodium chloride 0.9% lock flush 3 milliLiter(s) IV Push every 8 hours  sodium chloride 0.9%. 1000 milliLiter(s) (70 mL/Hr) IV Continuous <Continuous>    ==================>> VITAL SIGNS <<==================    T(C): 36.4 (04-16-19 @ 08:59), Max: 36.9 (04-15-19 @ 15:28)  HR: 62 (04-16-19 @ 08:59) (56 - 64)  BP: 172/84 (04-16-19 @ 08:59) (147/73 - 172/84)  RR: 17 (04-16-19 @ 08:59) (16 - 18)  SpO2: 98% (04-16-19 @ 08:59) (97% - 100%)     ==================>> LAB AND IMAGING <<==================                        14.0   11.30 )-----------( 176      ( 15 Apr 2019 12:50 )             43.3        139  |  105  |  19  ----------------------------<  148<H>  4.2   |  23  |  0.92    Ca    9.4      15 Apr 2019 12:50  Phos  3.3     04-15  Mg     2.0     04-15    TPro  7.2  /  Alb  4.2  /  TBili  0.6  /  DBili  x   /  AST  32  /  ALT  37  /  AlkPhos  80  04-15    PT/INR - ( 15 Apr 2019 01:30 )   PT: 10.9 SEC;   INR: 0.98     PTT - ( 15 Apr 2019 01:30 )  PTT:26.1 SEC              ~~ High Sensitivity Troponin  ~~  13  <<--, 12  <<--     TSH:      3.11   (04-16-19)           Lipid profile:  (04-16-19)     Total: 112     LDL  : 67     HDL  :39     TG   :98     HgA1C: 7.0  (04-16-19)            ___________________________________________________________________________________  ===============>>  A S S E S S M E N T   A N D   P L A N <<===============  ------------------------------------------------------------------------------------------    · Assessment		  67 y/o M with hx of CAD with stents, HTN, HLD presents with syncope.    Problem/Plan - 1:  ·  Problem: Syncope: liekly vasovagal event in seting of gastroenteritis   tele  check orthostatics   Echo   cath today  OOb as able     Problem/Plan - 2:  ·  Problem: Diarrhea: likely viral gastroenteritis: resolved  C-diff not sent as no more dh : can DC isolation   diet as tolerated   monitor  leukocytosis resolved     Problem/Plan - 3:  ·  Problem: CAD   asa, statin plavix.   cath    Problem/Plan - 4:  ·  Problem: Benign prostatic hypertrophy  cont finasteride.     Problem/Plan - 5:  ·  Problem: HTN    cont metoprolol  dash diet.   cardio f/u     -GI/DVT Prophylaxis.    --------------------------------------------  Case discussed with pt, family, cardio   Education given on findings and plan of care  ___________________________  H. EDOURAD Roche.  Pager: [phone]
68 year old male with a PMH of CAD, MI with stents x3 RCA in 2009, RCA stent x1 in 2017, HTN, HLD, BPH presented to ED after two syncopal episodes in the setting of nausea/diarrhea and abdominal discomfort.  The syncope most likely vasovagal etiology. Echocardiogram with normal LV systolic function. Cardiac cath with 80% stenosis pLAD, now stented.  His baseline EKG demonstrated mild bifascicular block manifest as LAFB and incomplete RBBB (QRS 98ms) but no evidence of AV block or symptomatic bradycardia on telemetry.   Plan:  Continue beta blocker therapy.            Monitor overnight.           Anticipate discharge in the morning.           Will follow-up with Dr. Preciado regarding elevated blood glucose and HgbA1c as well as blood pressure management.  -

## 2019-04-17 NOTE — PROGRESS NOTE ADULT - SUBJECTIVE AND OBJECTIVE BOX
Cardiovascular Disease Progress Note    Overnight events: No acute events overnight.  s/p PCI to LAD. no new cardiac sx  Otherwise review of systems negative    Objective Findings:  T(C): 36.3 (04-17-19 @ 06:45), Max: 36.7 (04-16-19 @ 15:57)  HR: 60 (04-17-19 @ 06:45) (58 - 62)  BP: 137/76 (04-17-19 @ 06:45) (134/76 - 172/84)  RR: 18 (04-17-19 @ 06:45) (17 - 18)  SpO2: 100% (04-17-19 @ 06:45) (97% - 100%)  Wt(kg): --  Daily     Daily       Physical Exam:  Gen: NAD  HEENT: EOMI  CV: RRR, normal S1 + S2, no m/r/g  Lungs: CTAB  Abd: soft, non-tender  Ext: No edema    Telemetry: nsr    Laboratory Data:                        14.2   9.41  )-----------( 170      ( 17 Apr 2019 06:30 )             43.1     04-17    139  |  104  |  13  ----------------------------<  148<H>  3.7   |  23  |  0.87    Ca    8.5      17 Apr 2019 06:30  Phos  2.4     04-17  Mg     2.1     04-17        CARDIAC MARKERS ( 15 Apr 2019 12:50 )  x     / x     / 178 u/L / 5.28 ng/mL / x              Inpatient Medications:  MEDICATIONS  (STANDING):  aspirin enteric coated 81 milliGRAM(s) Oral daily  atorvastatin 40 milliGRAM(s) Oral at bedtime  clopidogrel Tablet 75 milliGRAM(s) Oral daily  finasteride 5 milliGRAM(s) Oral daily  heparin  Injectable 5000 Unit(s) SubCutaneous every 12 hours  levothyroxine 125 MICROGram(s) Oral daily  lisinopril 5 milliGRAM(s) Oral daily  metoprolol succinate ER 50 milliGRAM(s) Oral daily  multivitamin 1 Tablet(s) Oral daily  pantoprazole    Tablet 40 milliGRAM(s) Oral before breakfast  simethicone 80 milliGRAM(s) Chew once  sodium chloride 0.9% lock flush 3 milliLiter(s) IV Push every 8 hours  sodium chloride 0.9%. 1000 milliLiter(s) (70 mL/Hr) IV Continuous <Continuous>  sodium chloride 0.9%. 500 milliLiter(s) (75 mL/Hr) IV Continuous <Continuous>      Assessment:  -syncope  -hx of NSTEMI/CAD s/p prior stents (most recently to dRCA in 10/2017)  -GI illness  -bifasicular block  -HTN    Recs:  -suspect syncope vasovagal  in setting of GI sx vs orhthostatics given context of diarrhea and poor PO intake. although cardiac syncope also in differential given bifasicular block and hx of NSTEMI. EP recs appreciated. sx resolved  -tte with evidence of LVH and BP elevated here. c/w  lisinopril 5mg daily  -c/w dapt and statin and toprol for CAD and new ABHAY to LAD  -dispo planning        Over 25 minutes spent on total encounter; more than 50% of the visit was spent counseling and/or coordinating care by the attending physician.      Donnie Precaido MD   Cardiovascular Disease  [phone]

## 2019-04-20 LAB
BACTERIA BLD CULT: SIGNIFICANT CHANGE UP
BACTERIA BLD CULT: SIGNIFICANT CHANGE UP

## 2020-04-23 ENCOUNTER — RX RENEWAL (OUTPATIENT)
Age: 70
End: 2020-04-23

## 2020-07-15 ENCOUNTER — APPOINTMENT (OUTPATIENT)
Dept: UROLOGY | Facility: CLINIC | Age: 70
End: 2020-07-15
Payer: COMMERCIAL

## 2020-07-15 VITALS — TEMPERATURE: 97.6 F

## 2020-07-15 PROCEDURE — 99214 OFFICE O/P EST MOD 30 MIN: CPT | Mod: 25

## 2020-07-15 PROCEDURE — 51798 US URINE CAPACITY MEASURE: CPT

## 2020-07-15 NOTE — ASSESSMENT
[FreeTextEntry1] : PVR 8 ml \par \par PLan\par 1) PSA- and CBC, CMP, A1C, and vit D, u/a for primary Dr. Edmonds to review \par 2) cont finasteride\par 3) RTC 1 year with full exam\par \par will review labs by phone

## 2020-07-15 NOTE — PHYSICAL EXAM
[General Appearance - Well Developed] : well developed [Abdomen Soft] : soft [Skin Color & Pigmentation] : normal skin color and pigmentation [Heart Rate And Rhythm] : Heart rate and rhythm were normal [] : no respiratory distress [Oriented To Time, Place, And Person] : oriented to person, place, and time [Normal Station and Gait] : the gait and station were normal for the patient's age [No Focal Deficits] : no focal deficits [Penis Abnormality] : normal uncircumcised penis [Urinary Bladder Findings] : the bladder was normal on palpation [Urethral Meatus] : meatus normal [Scrotum] : the scrotum was normal [Epididymis] : the epididymides were normal [Rectal Exam - Rectum] : digital rectal exam was normal [Testes Mass (___cm)] : there were no testicular masses [No Prostate Nodules] : no prostate nodules [Prostate Size ___ (0-4)] : prostate size [unfilled] (scale: 0-4)

## 2020-07-15 NOTE — HISTORY OF PRESENT ILLNESS
[Nocturia] : nocturia [None] : None [FreeTextEntry1] : 69 yr old presents to follow up with BPH. Pt denies frequency/urgency, dysuria, or hematuria. Nocturia x 0-3, which has been consistent for years with stopping fluids before bedtime and no caffeine consumption. Pt denies pain in abd/flank. \par \par On finasteride- ~ 7-8 years. \par \par PSA: \par 2.89- 4/2019\par 3.08- 4/2018\par 3.14- 4/2017\par 3.63 - 9/2016\par 3.79- 12/2015\par 4.38 6/2015\par 5.81 - 2/2015\par 4.76 - 12/2014\par 4.16 - 6/2013 [Urinary Urgency] : no urinary urgency [Dysuria] : no dysuria [Urinary Frequency] : no urinary frequency [Hematuria - Gross] : no gross hematuria

## 2020-07-16 LAB
25(OH)D3 SERPL-MCNC: 59.7 NG/ML
ALBUMIN SERPL ELPH-MCNC: 4.7 G/DL
ALP BLD-CCNC: 73 U/L
ALT SERPL-CCNC: 24 U/L
ANION GAP SERPL CALC-SCNC: 13 MMOL/L
APPEARANCE: CLEAR
AST SERPL-CCNC: 19 U/L
BACTERIA: NEGATIVE
BASOPHILS # BLD AUTO: 0.05 K/UL
BASOPHILS NFR BLD AUTO: 0.6 %
BILIRUB SERPL-MCNC: 0.8 MG/DL
BILIRUBIN URINE: NEGATIVE
BLOOD URINE: NEGATIVE
BUN SERPL-MCNC: 18 MG/DL
CALCIUM SERPL-MCNC: 9.6 MG/DL
CHLORIDE SERPL-SCNC: 106 MMOL/L
CO2 SERPL-SCNC: 26 MMOL/L
COLOR: YELLOW
CREAT SERPL-MCNC: 1.07 MG/DL
EOSINOPHIL # BLD AUTO: 0.06 K/UL
EOSINOPHIL NFR BLD AUTO: 0.8 %
ESTIMATED AVERAGE GLUCOSE: 146 MG/DL
GLUCOSE QUALITATIVE U: NEGATIVE
GLUCOSE SERPL-MCNC: 128 MG/DL
HBA1C MFR BLD HPLC: 6.7 %
HCT VFR BLD CALC: 48.9 %
HGB BLD-MCNC: 15.3 G/DL
HYALINE CASTS: 0 /LPF
IMM GRANULOCYTES NFR BLD AUTO: 0.4 %
KETONES URINE: NEGATIVE
LEUKOCYTE ESTERASE URINE: NEGATIVE
LYMPHOCYTES # BLD AUTO: 1.37 K/UL
LYMPHOCYTES NFR BLD AUTO: 17.2 %
MAN DIFF?: NORMAL
MCHC RBC-ENTMCNC: 27.3 PG
MCHC RBC-ENTMCNC: 31.3 GM/DL
MCV RBC AUTO: 87.2 FL
MICROSCOPIC-UA: NORMAL
MONOCYTES # BLD AUTO: 0.53 K/UL
MONOCYTES NFR BLD AUTO: 6.7 %
NEUTROPHILS # BLD AUTO: 5.91 K/UL
NEUTROPHILS NFR BLD AUTO: 74.3 %
NITRITE URINE: NEGATIVE
PH URINE: 6
PLATELET # BLD AUTO: 210 K/UL
POTASSIUM SERPL-SCNC: 4 MMOL/L
PROT SERPL-MCNC: 7.3 G/DL
PROTEIN URINE: NORMAL
PSA SERPL-MCNC: 3.73 NG/ML
RBC # BLD: 5.61 M/UL
RBC # FLD: 13.3 %
RED BLOOD CELLS URINE: 2 /HPF
SODIUM SERPL-SCNC: 145 MMOL/L
SPECIFIC GRAVITY URINE: 1.03
SQUAMOUS EPITHELIAL CELLS: 1 /HPF
UROBILINOGEN URINE: NORMAL
WBC # FLD AUTO: 7.95 K/UL
WHITE BLOOD CELLS URINE: 2 /HPF

## 2020-11-24 ENCOUNTER — OUTPATIENT (OUTPATIENT)
Dept: OUTPATIENT SERVICES | Facility: HOSPITAL | Age: 70
LOS: 1 days | End: 2020-11-24
Payer: COMMERCIAL

## 2020-11-24 DIAGNOSIS — Z11.59 ENCOUNTER FOR SCREENING FOR OTHER VIRAL DISEASES: ICD-10-CM

## 2020-11-24 LAB — SARS-COV-2 RNA SPEC QL NAA+PROBE: SIGNIFICANT CHANGE UP

## 2020-11-24 PROCEDURE — U0003: CPT

## 2020-11-27 ENCOUNTER — OUTPATIENT (OUTPATIENT)
Dept: OUTPATIENT SERVICES | Facility: HOSPITAL | Age: 70
LOS: 1 days | Discharge: HOME | End: 2020-11-27
Payer: COMMERCIAL

## 2020-11-27 DIAGNOSIS — I20.0 UNSTABLE ANGINA: ICD-10-CM

## 2020-11-27 PROCEDURE — 93010 ELECTROCARDIOGRAM REPORT: CPT

## 2020-11-27 RX ORDER — SODIUM CHLORIDE 9 MG/ML
3 INJECTION INTRAMUSCULAR; INTRAVENOUS; SUBCUTANEOUS EVERY 8 HOURS
Refills: 0 | Status: DISCONTINUED | OUTPATIENT
Start: 2020-11-27 | End: 2020-12-11

## 2020-11-27 NOTE — H&P CARDIOLOGY - HISTORY OF PRESENT ILLNESS
70  year old female with HTN, BPH, known CAD with MI and stents 2009 who presented to his Cardiologist complaining of   Underwent a Stress test  In light of patients cardiac risk factors, symptoms and abnormal noninvasive test findings there is high suspicion for CAD. Patient is now referred to [facility] for a cardiac catheterization with possible PTCA/stent.     Denies fever, cough, chills, headache, flu like symptoms, sick contact or recent travel  COVID PCR not detected on 11/24/2020

## 2020-11-27 NOTE — CHART NOTE - NSCHARTNOTEFT_GEN_A_CORE
patient s/p stent dRCA via RRA access. patient doing well, without complaints. BP since admit this morning noted to be hypertensive, however, patient admitted to feeling anxious and states his sister checks his BP daily and consistently /80's. During case s/p sedation BP improved to 's and during recovery noted to be -180's and patient continues to admit feeling anxious. Denies headache. Patient currently takes Metoprolol XL 50mg po daily at night, last dose for evening dose on 11/26/2020. Case reviewed with Dr Enegl, no need to add oral anti-hypertensive agent as patient seems reliable and home BP within normal range and patient is clearly anxious.  Patient to monitor over next week until he follows up with Dr Hamilton and further discuss if consistently high.   Patient advised of needed for ASA 81mg po daily and Plavix 75mg po daily for minimum of 1 year given new stent today.

## 2020-11-27 NOTE — H&P CARDIOLOGY - PMH
Benign prostatic hypertrophy    CAD (coronary artery disease)    Coronary artery disease    HTN (hypertension)    Myocardial infarction acute  anterior wall 2009  Stented coronary artery  endeavor x2 RCA 2009 - [facility]

## 2020-11-27 NOTE — CHART NOTE - NSCHARTNOTEFT_GEN_A_CORE
The patient was noted to have elevated HgbA1c 6.9. The patient was made aware of diabetes. The patient was recommended to f/u with PMD for further treatment.

## 2021-03-29 NOTE — DISCHARGE NOTE NURSING/CASE MANAGEMENT/SOCIAL WORK - NSTOBACCONEVERSMOKERY/N_GEN_A
The pt had her TFTs drawn. She states that she has hair loss and fatigue. She wasn't sure if you would be okay with her alternating 75mcg and 50mcg. Please advise. No

## 2021-05-04 NOTE — H&P ADULT - BACK EXAM
normal shape/ROM intact Patient requests all Lab and Radiology Results on their Discharge Instructions

## 2021-06-24 NOTE — H&P ADULT - SKIN
ADULT PROTOCOL: JET AEROSOL  REASSESSMENT    Patient  Quirino Samson     80 y.o.   female     6/24/2021  5:06 AM    Breath Sounds Pre Procedure: Right Breath Sounds: Coarse                               Left Breath Sounds: Coarse    Breath Sounds Post Procedure: Right Breath Sounds: Coarse                                 Left Breath Sounds: Coarse    Breathing pattern: Pre procedure Breathing Pattern: Regular          Post procedure Breathing Pattern: Regular    Heart Rate: Pre procedure Pulse: 87           Post procedure Pulse: 86    Resp Rate: Pre procedure Respirations: 18           Post procedure Respirations: 18         Cough: Pre procedure Cough: Non-productive               Post procedure Cough: Non-productive       Oxygen: O2 Device: Nasal cannula   Flow rate (L/min) 4     Changed: NO    SpO2: Pre procedure SpO2: 97 %   with oxygen              Post procedure SpO2: 93 %  with oxygen    Nebulizer Therapy: Current medications Aerosolized Medications: Brovana      Changed: YES        Problem List:   Patient Active Problem List   Diagnosis Code    HTN (hypertension) I10    Type II diabetes mellitus ([facility] [address] E11.9    Hypothyroid E03.9    Anxiety F41.9    History of CVA (cerebrovascular accident) Z80.78    Acute pulmonary embolism (HCC) I26.99    Precordial pain R07.2    Dizziness R42    Abdominal pain R10.9    Hyponatremia E87.1    Dizzy R42    Hx of completed stroke Z80.78    DM (diabetes mellitus), type 2 (HCC) E11.9    Chronic obstructive pulmonary disease (HCC) J44.9    Arthritis M19.90    Chronic pain G89.29    Anxiety and depression F41.9, F32.9    Glaucoma H40.9    Neuropathy G62.9    Polypharmacy Z79.899    Incontinence R32    GERD (gastroesophageal reflux disease) K21.9    Elbow laceration, left, initial encounter S51.012A    Acute metabolic encephalopathy F88.04    Acute respiratory insufficiency R06.89    CAP (community acquired pneumonia) J18.9    Pneumonia J18.9    COPD with acute exacerbation (HCC) J44.1    Acute on chronic respiratory failure with hypoxia (HCC) J96.21    Anemia of chronic illness D63.8    Chronic pulmonary embolism (HCC) I27.82    HAP (hospital-acquired pneumonia) J18.9, Y95    Pneumonia of both lungs due to infectious organism J18.9    Multiple fractures T07[facility] Physical debility R53.81    COPD (chronic obstructive pulmonary disease) (HCC) J44.9    Hypokalemia E87.6    Head trauma S09.90XA    Troponin level elevated R77.8    Elevated brain natriuretic peptide (BNP) level R79.89    HCAP (healthcare-associated pneumonia) J18.9    Acute hypoxemic respiratory failure (HCC) J96.01    ACP (advance care planning) Z71.89       Respiratory Therapist: Ken Cannon RT detailed exam color normal

## 2021-06-30 ENCOUNTER — APPOINTMENT (OUTPATIENT)
Dept: UROLOGY | Facility: CLINIC | Age: 71
End: 2021-06-30
Payer: COMMERCIAL

## 2021-06-30 PROCEDURE — 99072 ADDL SUPL MATRL&STAF TM PHE: CPT

## 2021-06-30 PROCEDURE — 99214 OFFICE O/P EST MOD 30 MIN: CPT

## 2021-06-30 NOTE — LETTER BODY
[Dear  ___] : Dear  [unfilled], [Courtesy Letter:] : I had the pleasure of seeing your patient, [unfilled], in my office today. [Please see my note below.] : Please see my note below. [Consult Closing:] : Thank you very much for allowing me to participate in the care of this patient.  If you have any questions, please do not hesitate to contact me. [Sincerely,] : Sincerely, [FreeTextEntry1] : Now 70 yr old presents to follow up with BPH. Pt denies frequency/urgency, dysuria, or hematuria. Nocturia x 0-3, which has been consistent for years with stopping fluids before bedtime and no caffeine consumption. Pt denies pain in abd/flank. Recent U/a all wnl. Doing overall great.\par \par On finasteride- ~ 8-9 years. \par \par PSA: \par 2.67-- 5/15/21\par 2.10- 11/23/20\par 2.89- 4/2019\par 3.08- 4/2018\par 3.14- 4/2017\par 3.63 - 9/2016\par 3.79- 12/2015\par 4.38 6/2015\par 5.81 - 2/2015\par 4.76 - 12/2014\par 4.16 - 6/2013\par \par No pvr today; has been low and stable\par PE unremarkable- prostate mildly enlarged without nodules or induration\par \par Plan\par 1) PSA can be followed by Dr. Preciado\par 2) cont finasteride\par 3) RTC 1 year with full exam\par \par will review labs by phone

## 2021-06-30 NOTE — PHYSICAL EXAM
[General Appearance - Well Developed] : well developed [Abdomen Soft] : soft [Urethral Meatus] : meatus normal [Penis Abnormality] : normal uncircumcised penis [Urinary Bladder Findings] : the bladder was normal on palpation [Scrotum] : the scrotum was normal [Epididymis] : the epididymides were normal [Testes Mass (___cm)] : there were no testicular masses [Rectal Exam - Rectum] : digital rectal exam was normal [No Prostate Nodules] : no prostate nodules [Prostate Size ___ (0-4)] : prostate size [unfilled] (scale: 0-4) [Skin Color & Pigmentation] : normal skin color and pigmentation [Heart Rate And Rhythm] : Heart rate and rhythm were normal [] : no respiratory distress [Oriented To Time, Place, And Person] : oriented to person, place, and time [Normal Station and Gait] : the gait and station were normal for the patient's age [No Focal Deficits] : no focal deficits

## 2021-06-30 NOTE — HISTORY OF PRESENT ILLNESS
[Nocturia] : nocturia [None] : None [FreeTextEntry1] : Now 70 yr old presents to follow up with BPH. Pt denies frequency/urgency, dysuria, or hematuria. Nocturia x 0-3, which has been consistent for years with stopping fluids before bedtime and no caffeine consumption. Pt denies pain in abd/flank. Recent U/a all wnl. Doing overall great.\par \par On finasteride- ~ 8-9 years. \par \par PSA: \par 2.67-- 5/15/21\par 2.10- 11/23/20\par 2.89- 4/2019\par 3.08- 4/2018\par 3.14- 4/2017\par 3.63 - 9/2016\par 3.79- 12/2015\par 4.38 6/2015\par 5.81 - 2/2015\par 4.76 - 12/2014\par 4.16 - 6/2013 [Urinary Urgency] : no urinary urgency [Urinary Frequency] : no urinary frequency [Dysuria] : no dysuria [Hematuria - Gross] : no gross hematuria

## 2021-06-30 NOTE — ASSESSMENT
[FreeTextEntry1] : No pvr today; has been low and stable\par \par Plan\par 1) PSA can be followed by Dr. Preciado\par 2) cont finasteride\par 3) RTC 1 year with full exam\par \par will review labs by phone

## 2021-08-03 ENCOUNTER — NON-APPOINTMENT (OUTPATIENT)
Age: 71
End: 2021-08-03

## 2021-08-05 ENCOUNTER — APPOINTMENT (OUTPATIENT)
Dept: UROLOGY | Facility: CLINIC | Age: 71
End: 2021-08-05

## 2021-10-14 ENCOUNTER — INPATIENT (INPATIENT)
Facility: HOSPITAL | Age: 71
LOS: 4 days | Discharge: HOME | End: 2021-10-19
Attending: GENERAL PRACTICE | Admitting: GENERAL PRACTICE
Payer: COMMERCIAL

## 2021-10-14 VITALS
SYSTOLIC BLOOD PRESSURE: 195 MMHG | RESPIRATION RATE: 18 BRPM | HEART RATE: 65 BPM | HEIGHT: 67 IN | OXYGEN SATURATION: 100 % | DIASTOLIC BLOOD PRESSURE: 96 MMHG | TEMPERATURE: 98 F

## 2021-10-14 DIAGNOSIS — K62.5 HEMORRHAGE OF ANUS AND RECTUM: ICD-10-CM

## 2021-10-14 LAB
ALBUMIN SERPL ELPH-MCNC: 4.5 G/DL — SIGNIFICANT CHANGE UP (ref 3.3–5)
ALP SERPL-CCNC: 47 U/L — SIGNIFICANT CHANGE UP (ref 40–120)
ALT FLD-CCNC: 25 U/L — SIGNIFICANT CHANGE UP (ref 4–41)
ANION GAP SERPL CALC-SCNC: 12 MMOL/L — SIGNIFICANT CHANGE UP (ref 7–14)
APTT BLD: 32.3 SEC — SIGNIFICANT CHANGE UP (ref 27–36.3)
AST SERPL-CCNC: 22 U/L — SIGNIFICANT CHANGE UP (ref 4–40)
BASOPHILS # BLD AUTO: 0.04 K/UL — SIGNIFICANT CHANGE UP (ref 0–0.2)
BASOPHILS NFR BLD AUTO: 0.4 % — SIGNIFICANT CHANGE UP (ref 0–2)
BILIRUB SERPL-MCNC: 0.8 MG/DL — SIGNIFICANT CHANGE UP (ref 0.2–1.2)
BLD GP AB SCN SERPL QL: NEGATIVE — SIGNIFICANT CHANGE UP
BUN SERPL-MCNC: 16 MG/DL — SIGNIFICANT CHANGE UP (ref 7–23)
CALCIUM SERPL-MCNC: 9.2 MG/DL — SIGNIFICANT CHANGE UP (ref 8.4–10.5)
CHLORIDE SERPL-SCNC: 105 MMOL/L — SIGNIFICANT CHANGE UP (ref 98–107)
CO2 SERPL-SCNC: 26 MMOL/L — SIGNIFICANT CHANGE UP (ref 22–31)
CREAT SERPL-MCNC: 0.99 MG/DL — SIGNIFICANT CHANGE UP (ref 0.5–1.3)
EOSINOPHIL # BLD AUTO: 0.05 K/UL — SIGNIFICANT CHANGE UP (ref 0–0.5)
EOSINOPHIL NFR BLD AUTO: 0.5 % — SIGNIFICANT CHANGE UP (ref 0–6)
GLUCOSE SERPL-MCNC: 127 MG/DL — HIGH (ref 70–99)
HCT VFR BLD CALC: 44.2 % — SIGNIFICANT CHANGE UP (ref 39–50)
HGB BLD-MCNC: 14.9 G/DL — SIGNIFICANT CHANGE UP (ref 13–17)
IANC: 8.66 K/UL — HIGH (ref 1.5–8.5)
IMM GRANULOCYTES NFR BLD AUTO: 0.5 % — SIGNIFICANT CHANGE UP (ref 0–1.5)
INR BLD: 1.08 RATIO — SIGNIFICANT CHANGE UP (ref 0.88–1.16)
LYMPHOCYTES # BLD AUTO: 1.39 K/UL — SIGNIFICANT CHANGE UP (ref 1–3.3)
LYMPHOCYTES # BLD AUTO: 13 % — SIGNIFICANT CHANGE UP (ref 13–44)
MCHC RBC-ENTMCNC: 27.3 PG — SIGNIFICANT CHANGE UP (ref 27–34)
MCHC RBC-ENTMCNC: 33.7 GM/DL — SIGNIFICANT CHANGE UP (ref 32–36)
MCV RBC AUTO: 81 FL — SIGNIFICANT CHANGE UP (ref 80–100)
MONOCYTES # BLD AUTO: 0.48 K/UL — SIGNIFICANT CHANGE UP (ref 0–0.9)
MONOCYTES NFR BLD AUTO: 4.5 % — SIGNIFICANT CHANGE UP (ref 2–14)
NEUTROPHILS # BLD AUTO: 8.66 K/UL — HIGH (ref 1.8–7.4)
NEUTROPHILS NFR BLD AUTO: 81.1 % — HIGH (ref 43–77)
NRBC # BLD: 0 /100 WBCS — SIGNIFICANT CHANGE UP
NRBC # FLD: 0 K/UL — SIGNIFICANT CHANGE UP
OB PNL STL: POSITIVE
PLATELET # BLD AUTO: 212 K/UL — SIGNIFICANT CHANGE UP (ref 150–400)
POTASSIUM SERPL-MCNC: 4.2 MMOL/L — SIGNIFICANT CHANGE UP (ref 3.5–5.3)
POTASSIUM SERPL-SCNC: 4.2 MMOL/L — SIGNIFICANT CHANGE UP (ref 3.5–5.3)
PROT SERPL-MCNC: 7.6 G/DL — SIGNIFICANT CHANGE UP (ref 6–8.3)
PROTHROM AB SERPL-ACNC: 12.4 SEC — SIGNIFICANT CHANGE UP (ref 10.6–13.6)
RBC # BLD: 5.46 M/UL — SIGNIFICANT CHANGE UP (ref 4.2–5.8)
RBC # FLD: 12.9 % — SIGNIFICANT CHANGE UP (ref 10.3–14.5)
RH IG SCN BLD-IMP: NEGATIVE — SIGNIFICANT CHANGE UP
SODIUM SERPL-SCNC: 143 MMOL/L — SIGNIFICANT CHANGE UP (ref 135–145)
WBC # BLD: 10.67 K/UL — HIGH (ref 3.8–10.5)
WBC # FLD AUTO: 10.67 K/UL — HIGH (ref 3.8–10.5)

## 2021-10-14 PROCEDURE — 99223 1ST HOSP IP/OBS HIGH 75: CPT

## 2021-10-14 PROCEDURE — 99285 EMERGENCY DEPT VISIT HI MDM: CPT

## 2021-10-14 RX ORDER — HYDRALAZINE HCL 50 MG
10 TABLET ORAL ONCE
Refills: 0 | Status: COMPLETED | OUTPATIENT
Start: 2021-10-14 | End: 2021-10-14

## 2021-10-14 NOTE — H&P ADULT - HISTORY OF PRESENT ILLNESS
71M pmhx of HTN/HLD, prior stents on asa and plavix presenting with CC of rectal bleeding 71M with PMHx CAD s/p stents (last 11/2020 to RCA on ASA/plavix), HTN, HLD, hypothyroidism, diverticulosis, BPH presenting with rectal bleeding x1 day. Pt called his GI doctor and was told to come to ED. Pt at baseline does not have rectal bleeding other than years ago once when wiping and was bright red. He believes his last C-scope was 2016 and noted to have diverticulosis in the past but no prior bleeding. Today while patient was at work he had multiple episodes of BRBPR (total 7 episodes today with 2 in the ED). The first episode had a small formed stool that was black in color but further episodes were bright red and without black stool. Over the last two months he has been experiencing intermittent lower abdominal cramping and stools alternate between formed and diarrhea. Currently he does not have abdominal pain, vomiting, diarrhea, fevers, chills, LH, LOC, dizziness. Pt is compliant with his ASA and plavix. He follows with Dr. Preciado of cardiology. Denies CP or SOB.    In ED, FOBT positive, BRBPR episodes. HD stable. Hypertensive to 170s. Hgb normal 71M with PMHx CAD s/p stents (last 11/2020 to RCA on ASA/plavix), HTN, HLD, hypothyroidism, diverticulosis, BPH presenting with rectal bleeding x1 day. Pt called his GI doctor and was told to come to ED. Pt at baseline does not have rectal bleeding other than years ago once when wiping and was bright red. He believes his last C-scope was 2016 and noted to have diverticulosis in the past but no prior bleeding. Today while patient was at work he had multiple episodes of BRBPR (total 7 episodes today with 2 in the ED). The first episode had a small formed stool that was black in color but further episodes were bright red and without black stool. Over the last two months he has been experiencing intermittent lower abdominal cramping and stools alternate between formed and diarrhea. Currently he does not have abdominal pain, vomiting, diarrhea, fevers, chills, LH, LOC, dizziness. Pt is compliant with his ASA and plavix. He follows with Dr. Preciado of cardiology. Denies CP or SOB.    In ED, FOBT positive, BRBPR episodes. HD stable. Hypertensive to 170s. Hgb normal  Patient noted to have another BRBPR episode ~1240AM.

## 2021-10-14 NOTE — H&P ADULT - NSHPREVIEWOFSYSTEMS_GEN_ALL_CORE
ROS:    Constitutional: [ ] fevers [ ] chills   HEENT: [ ] dry eyes [ ] eye irritation   CV: [ ] chest pain [ ] orthopnea [ ] palpitations   Resp: [ ] cough [ ] shortness of breath [ ] dyspnea [ ] wheezing   GI: [ ] nausea [ ] vomiting [ ] diarrhea [ ] constipation [x ] abd pain [ ] dysphagia [x] hematochezia  : [ ] dysuria [ ] nocturia [ ] hematuria   Musculoskeletal: [ ] back pain [ ] myalgias [ ] arthralgias  Skin: [ ] rash [ ] itch  Neurological: [ ] headache [ ] dizziness [ ] syncope  Endocrine: [ ] diabetes [ ] thyroid problem  Hematologic/Lymphatic: [ ] anemia [ ] bleeding problem  Allergic/Immunologic: [ ] itchy eyes [ ] nasal discharge   [x ] All other systems negative ROS:    Constitutional: [ ] fevers [ ] chills   HEENT: [ ] dry eyes [ ] eye irritation   CV: [ ] chest pain [ ] orthopnea [ ] palpitations   Resp: [ ] cough [ ] shortness of breath [ ] dyspnea [ ] wheezing   GI: [ ] nausea [ ] vomiting [ ] diarrhea [ ] constipation [x ] abd pain [ ] dysphagia [x] hematochezia [x] melena  : [ ] dysuria [ ] nocturia [ ] hematuria   Musculoskeletal: [ ] back pain [ ] myalgias [ ] arthralgias  Skin: [ ] rash [ ] itch  Neurological: [ ] headache [ ] dizziness [ ] syncope  Endocrine: [ ] diabetes [ ] thyroid problem  Hematologic/Lymphatic: [ ] anemia [ ] bleeding problem  Allergic/Immunologic: [ ] itchy eyes [ ] nasal discharge   [x ] All other systems negative

## 2021-10-14 NOTE — ED PROVIDER NOTE - PROGRESS NOTE DETAILS
PT had another episode of bleeding - will admit for GI w/u. Pt endorsed to Dr. Roche. If continues to be HDS, tba medicine. Pt signed out to night team w labs pending. Dora Bhagat, JOVANNA PGY3

## 2021-10-14 NOTE — ED ADULT NURSE NOTE - PAIN RATING/NUMBER SCALE (0-10): REST
"Grandmother (Eleni) is the caller.  Brought child to clinic yesterday for sore throat and fever.  Rapid strep neg.  Strep culture still pending.    Sore throat and fever persist today.  Doesn't want to swallow anything.  Discussed viral sore throat is often more painful than strep.  Still awaiting throat culture results ...    Child still hydrated.  Overnight pull-up was wet.    Grandmother states \"Temp is even a little higher today than in clinic yesterday.\"  Temp upon waking -> 101.8 (via tympanic).  Has administered Tylenol as well as ibuprofen alternately.  Current temp 100.4 after one hour of meds.    Today represents 4 full days of fever.  Temp \"even a little bit higher today, and not responding much to Tylenol, requires ibuprofen alternately in order to come down.\"    Discussed child's fever is still considered mild to moderate.  No need to entirely erase fever with meds.  Advised discontinuing ibuprofen since this will tend to cause stomach irritation, serjio with currently decreased oral intake.    New today -> \"Green goop coming out of one eye.\"  Advised warm cottonball compresses to cleanse eye and encourage increased blood flow to area.  Instructed to continue this 4x daily as feasible.    Grandmother's QUESTIONS:   Due to duration of fever and new purulent eye drainage, are antibiotics warranted?  - Grandmother hopes for antibiotic Rx's.  - States \"Cannot come back to clinic and pay another $250 co-pay when we just came there yesterday and left empty-handed.\"    Please advise; thank you.  Pharmacy is verified in chart.  Detailed voicemail message okay.     Jodie Jiménez RN BSBA  [facility] RN Triage     Reason for Disposition    Fever present > 3 days    Eye with yellow/green discharge or eyelashes stuck together and no standing order for prescription antibiotic eye drops    Protocols used: SORE THROAT-P-OH, EYE - PUS OR [id number]      " 0

## 2021-10-14 NOTE — H&P ADULT - NSHPLABSRESULTS_GEN_ALL_CORE
I have personally reviewed this patient's labs below:                        14.9   10.67 )-----------( 212      ( 14 Oct 2021 19:41 )             44.2     10-14-21 @ 19:41    143  |  105  |  16             --------------------------< 127<H>     4.2  |  26  | 0.99    eGFR AA: 88  eGFR N-AA: 76    Calcium: 9.2  Phosphorus: --  Magnesium: --    AST: 22    ALT: 25  AlkPhos: 47  Protein: 7.6  Albumin: 4.5  TBili: 0.8  D-Bili: --      FOBT positive    I have personally reviewed this patient's EKG and my independent interpretation is NSR, PVC, no ST depressions or elevations. Left axis deviation      Review and summation of old records:  TTE 4/2019  CONCLUSIONS:  1. Increased relative wall thickness with normal left  ventricular mass index, consistent with concentric left  ventricular remodeling.  2. Normal left ventricular systolic function. No segmental  wall motion abnormalities.  3. Normal right ventricular size and function.  4. Estimated right ventricular systolic pressure equals 13  mm Hg, assuming right atrial pressure equals 10 mm Hg,  consistent with normal pulmonary pressures. I have personally reviewed this patient's labs below:                        14.9   10.67 )-----------( 212      ( 14 Oct 2021 19:41 )             44.2     10-14-21 @ 19:41    143  |  105  |  16             --------------------------< 127<H>     4.2  |  26  | 0.99    eGFR AA: 88  eGFR N-AA: 76    Calcium: 9.2  Phosphorus: --  Magnesium: --    AST: 22    ALT: 25  AlkPhos: 47  Protein: 7.6  Albumin: 4.5  TBili: 0.8  D-Bili: --      FOBT positive    I have personally reviewed this patient's EKG and my independent interpretation is NSR, PVC, no ST depressions or elevations. Left axis deviation    CT A/P with IV contrast ordered and pending    Review and summation of old records:  TTE 4/2019  CONCLUSIONS:  1. Increased relative wall thickness with normal left  ventricular mass index, consistent with concentric left  ventricular remodeling.  2. Normal left ventricular systolic function. No segmental  wall motion abnormalities.  3. Normal right ventricular size and function.  4. Estimated right ventricular systolic pressure equals 13  mm Hg, assuming right atrial pressure equals 10 mm Hg,  consistent with normal pulmonary pressures.

## 2021-10-14 NOTE — H&P ADULT - ASSESSMENT
71M with PMHx CAD s/p stents (last 11/2020 to RCA on ASA/plavix), HTN, HLD, hypothyroidism, diverticulosis, BPH presenting with rectal bleeding x1 day. FOBT positive, BRBPR in ED

## 2021-10-14 NOTE — ED ADULT TRIAGE NOTE - CHIEF COMPLAINT QUOTE
pt c/o 3 episodes of rectal bleeding since 2 pm. pt is on plavix and ASA daily. denies abd pain. c/o feeling light headed.  pmh- htn, cad

## 2021-10-14 NOTE — H&P ADULT - PROBLEM SELECTOR PLAN 2
On ASA, plavix  Hold plavix for now  Cardiology consult in AM  Last stent 11/2020 to RCA and cardiology rec'd one year of DAPT. However with continuing GIB risks of DAPT are high at this time. Will only give ASA On ASA, plavix - hold for now  Cardiology consult in AM  Last stent 11/2020 to RCA and cardiology rec'd one year of DAPT. However with continuing GIB risks of DAPT are high at this time.  Resume statin, BB On ASA, plavix - hold for now  Cardiology consult in AM - follows Dr. Preciado  Last stent 11/2020 to RCA and cardiology rec'd one year of DAPT. However with continuing GIB risks of DAPT are high at this time.  Resume statin, BB

## 2021-10-14 NOTE — ED PROVIDER NOTE - OBJECTIVE STATEMENT
71M pmhx of HTN/HLD, prior stents on asa and plavix presenting with CC of rectal bleeding. Pt describes 4 episodes of blood in the toilet. Initial episode was with black stool, after that - just blood without stools. Denies nausea/vomiting/fevers/chills. No CP, SOB, lightheadedness. States that he has been having episodes of cramping over the past month, but no abdominal pain right now. +history of diverticulosis diagnosed on colonoscopy in the past.     PCP: Segundo Azar

## 2021-10-14 NOTE — ED PROVIDER NOTE - ATTENDING CONTRIBUTION TO CARE
I have personally performed a face to face medical and diagnostic evaluation of the patient. I have discussed with and reviewed the Resident's note and agree with the History, ROS, Physical Exam and MDM unless otherwise indicated. A brief summary of my personal evaluation and impression can be found below.     72yo M hx htn, hld, stents on DAP p/w 4 episodes of BRBPR filling not with any stool, 1st episode had small amount of black loose stool. No fever, chills, n/v/d, cp, sob, lightheadedness. +hx of diverticulosis on last colonoscopy years ago.  VITALS: Initial triage and subsequent vitals have been reviewed by me.  Gen: Well appearing, NAD, alert, non-toxic  Head: NCAT  HEENT: MMM, normal conjunctiva, anicteric, neck supple  Lung: CTAB, no adventitious sounds  CV: RRR, no murmurs, 2+symmetric peripheral pulses  Abd: soft, NTND, no rebound or guarding, no palpable masses  MSK: No edema, no visible deformities  Neuro: Moving all extremity grossly, following commands appropriately, fluid speech  Skin: Warm and dry, no evidence of rash  Psych: normal mood and affect  See resident note for rectal exam - no obvious hemorrhoids or brisk bleed, blood on glove  Episodes of BRBPR on DAP concern for potential diverticular bleed. Will check labs r/o anemia and continue to monitor anticipate likely admission for GI

## 2021-10-14 NOTE — H&P ADULT - NSHPPHYSICALEXAM_GEN_ALL_CORE
PHYSICAL EXAM:  Vital Signs Last 24 Hrs  T(C): 36.4 (10-14-21 @ 22:08)  T(F): 97.5 (10-14-21 @ 22:08), Max: 98 (10-14-21 @ 16:54)  HR: 56 (10-14-21 @ 23:05) (56 - 65)  BP: 171/79 (10-14-21 @ 23:05)  BP(mean): --  RR: 16 (10-14-21 @ 22:08) (16 - 18)  SpO2: 100% (10-14-21 @ 22:08) (100% - 100%)  Wt(kg): --    Constitutional: NAD, awake and alert, well developed  EYES: EOMI, conjunctiva clear  ENT:  Normal Hearing, no tonsillar exudates   Neck: Soft and supple , no thyromegaly   Respiratory: Breath sounds are clear bilaterally, No wheezing, rales or rhonchi, no tachypnea, no accessory muscle use  Cardiovascular: S1 and S2, regular rate and rhythm, no Murmurs, gallops or rubs, no JVD, no leg edema  Gastrointestinal: Bowel Sounds present, soft, nontender, nondistended, no guarding, no rebound  Extremities: No cyanosis or clubbing; warm to touch  Vascular: 2+ peripheral pulses lower ex  Neurological: No focal deficits, CN II-XII intact bilaterally, sensation to light touch intact in all extremities. Pupils are equally reactive to light and symmetrical in size.   Musculoskeletal: 5/5 strength b/l upper and lower extremities; no joint swelling.  Skin: No rashes, no ulcerations PHYSICAL EXAM:  Vital Signs Last 24 Hrs  T(C): 36.4 (10-14-21 @ 22:08)  T(F): 97.5 (10-14-21 @ 22:08), Max: 98 (10-14-21 @ 16:54)  HR: 56 (10-14-21 @ 23:05) (56 - 65)  BP: 171/79 (10-14-21 @ 23:05)  BP(mean): --  RR: 16 (10-14-21 @ 22:08) (16 - 18)  SpO2: 100% (10-14-21 @ 22:08) (100% - 100%)  Wt(kg): --    Constitutional: NAD, awake and alert, well developed  EYES: EOMI, conjunctiva clear  ENT:  Normal Hearing, no tonsillar exudates   Neck: Soft and supple , no thyromegaly   Respiratory: Breath sounds are clear bilaterally, No wheezing, rales or rhonchi, no tachypnea, no accessory muscle use  Cardiovascular: S1 and S2, regular rate and rhythm, no Murmurs, gallops or rubs, no JVD, no leg edema  Gastrointestinal: Bowel Sounds present, soft, nontender, nondistended, no guarding, no rebound  Extremities: No cyanosis or clubbing; warm to touch  Neurological: No focal deficits, CN II-XII intact bilaterally, sensation to light touch intact in all extremities. Pupils are equally reactive to light and symmetrical in size.   Musculoskeletal: 5/5 strength b/l upper and lower extremities; no joint swelling.  Skin: No rashes, no ulcerations

## 2021-10-14 NOTE — ED PROVIDER NOTE - CLINICAL SUMMARY MEDICAL DECISION MAKING FREE TEXT BOX
71M presenting w BRBPR. On exam, VSS w bright red blood on rectal exam. Given the number of episodes pt will likely need admission for GI w/u. Based on exam, likely diverticular bleed. Pt w out any abdominal tenderness at this time. No imaging indicated - will obtain labs and CTM. Dora Bhagat, EM PGY3

## 2021-10-14 NOTE — ED PROVIDER NOTE - PHYSICAL EXAMINATION
Const: Well-nourished, Well-developed, appearing stated age.  Eyes: no conjunctival injection, and symmetrical lids.  HEENT: Head NCAT, no lesions. Atraumatic external nose and ears.   Neck: Symmetric, trachea midline.   CVS: +S1/S2, Peripheral pulses 2+ and equal in all extremities.  RESP: Unlabored respiratory effort. Clear to auscultation bilaterally.  GI: Nontender/Nondistended in all 4 quadrants, No CVA tenderness b/l.   MSK: Normocephalic/Atraumatic, Lower Extremities w/o calf tenderness or edema b/l.   Skin: Warm, dry and intact.   Neuro: CNs II-XII grossly intact. Motor & Sensation grossly intact.  Psych: Awake, Alert, & Oriented (AAO) x3. Appropriate mood and affect.    Rectal exam w Dr. Moss: +bright red blood, no internal hemorrhoids palpated.

## 2021-10-14 NOTE — ED ADULT NURSE NOTE - OBJECTIVE STATEMENT
Pt AOX4 c/o several sudden episodes of bright red blood per rectum, denies pain at this time, pt is on Plavix for many years s/p 5 stents placed over time since 2009; pt denies any dizziness or SOB, ambulates without assistance, no nausea or diarrhea or vomiting; Sinus Rhythm on cardiac monitor, labs drawn and sent, awaiting further MD orders.

## 2021-10-14 NOTE — H&P ADULT - PROBLEM SELECTOR PLAN 1
Hgb 14.9 and HD stable. BRBPR in ED, FOBT positive  Hx diverticulosis and hemorrhoids per patient. Likely source  -reported small melena episode but no longer  -will consult GI  -PPI BID for now pending GI w/u though much more likely LGIB source  -add on ferritin, iron, B12, folate  -repeat CBC  -if worsening bloody stools on admission would obtain CT A/P with IV contrast to r/o active bleeding Hgb 14.9 and HD stable. BRBPR in ED, FOBT positive. Had additional episode BRBPR ~1240AM which is about 2nd or 3rd episode in ED  Hx diverticulosis and hemorrhoids per patient. Likely source  -given continuing BRBPR while in ED and after my assessment will obtain urgent CTA A/P to r/o active extravasation. If active bleeding will consult IR   -active T&S and IV access, transfuse hgb depending on if large drops in hematocrit and symptoms. Currently asymptomatic  -hold ASA and plavix given active bleeding - cardiology consult in AM. May need to be on SAPT. Patient now ~11 months since last stent and risks of DAPT are high given active bleeding  -reported small melena episode with first episode and then also with last episode  -will consult GI  -PPI BID for now pending GI w/u though much more likely LGIB source  -add on ferritin, iron  -repeat CBC stat Hgb 14.9 and HD stable. BRBPR in ED, FOBT positive. Had additional episode BRBPR ~1240AM which is about 2nd or 3rd episode in ED  Hx diverticulosis and hemorrhoids per patient. Likely source  -given continuing BRBPR while in ED and after my assessment will obtain urgent CTA A/P to r/o active extravasation. Discussed with radiology tech and will expedite. If active bleeding will consult IR and general surgery.  -active T&S and IV access, transfuse hgb depending on if large drops in hematocrit and symptoms. Currently asymptomatic  -hold ASA and plavix given active bleeding - cardiology consult in AM. May need to be on SAPT. Patient now ~11 months since last stent and risks of DAPT are high given active bleeding  -reported small melena episode with first episode and then also with last episode  -will consult GI  -PPI BID for now pending GI w/u though much more likely LGIB source  -add on ferritin, iron  -repeat CBC stat, CBC [id number] Hgb 14.9 and HD stable. BRBPR in ED, FOBT positive. Had additional episode BRBPR ~1240AM which is about 2nd or 3rd episode in ED  Hx diverticulosis and hemorrhoids per patient. Likely source  -given continuing BRBPR while in ED and after my assessment will obtain urgent CTA A/P to r/o active extravasation. Discussed with radiology tech and will expedite. If active bleeding will consult IR and general surgery.  -active T&S and IV access, transfuse hgb depending on if large drops in hematocrit and symptoms. Currently asymptomatic  -hold ASA and plavix given active bleeding - cardiology consult in AM. May need to be on SAPT. Patient now ~11 months since last stent and risks of DAPT are high given active bleeding  -reported small melena episode with first episode and then also with last episode  -GI consult - emailed  -PPI BID for now pending GI w/u though much more likely LGIB source  -add on ferritin, iron  -repeat CBC stat, CBC [id number]  -consented for blood transfusion if needed

## 2021-10-14 NOTE — ED ADULT NURSE REASSESSMENT NOTE - NS ED NURSE REASSESS COMMENT FT1
Received report from day shift RN. Patient received A&Ox4 with 20G IV. Pt offering no complaints and is in no acute distress at this time.  Respirations even and unlabored.  Stretcher in lowest position, wheels locked, side rails up as per protocol. Vital signs are per flowsheet.

## 2021-10-14 NOTE — ED PROVIDER NOTE - NS ED ROS FT
CONST: no fevers, no chills, no trauma  EYES: no pain, no blurry vision   ENT: no sore throat, no epistaxis, no rhinorrhea  CV: no chest pain, no palpitations, no orthopnea, no extremity pain or swelling  RESP: no shortness of breath, no cough, no sputum, no pleurisy, no wheezing  ABD: no abdominal pain, no nausea, no vomiting, no diarrhea, + black or bloody stool  : no dysuria, no hematuria, no frequency, no urgency  MSK: no back pain, no neck pain, no extremity pain  NEURO: no headache, no sensory disturbances, no focal weakness, no dizziness  HEME: no easy bleeding or bruising  SKIN: no diaphoresis, no rash

## 2021-10-14 NOTE — ED PROVIDER NOTE - NSICDXPASTMEDICALHX_GEN_ALL_CORE_FT
PAST MEDICAL HISTORY:  Benign prostatic hypertrophy     CAD (coronary artery disease)     Coronary artery disease     HTN (hypertension)     Myocardial infarction acute anterior wall 2009    Stented coronary artery endeavor x2 RCA 2009 - [facility]

## 2021-10-14 NOTE — H&P ADULT - NSICDXPASTMEDICALHX_GEN_ALL_CORE_FT
PAST MEDICAL HISTORY:  Benign prostatic hypertrophy     CAD (coronary artery disease)     Coronary artery disease     HTN (hypertension)     Myocardial infarction acute anterior wall 2009    Stented coronary artery endeavor x2 RCA 2009 - [facility]

## 2021-10-15 DIAGNOSIS — I10 ESSENTIAL (PRIMARY) HYPERTENSION: ICD-10-CM

## 2021-10-15 DIAGNOSIS — E03.9 HYPOTHYROIDISM, UNSPECIFIED: ICD-10-CM

## 2021-10-15 DIAGNOSIS — K62.5 HEMORRHAGE OF ANUS AND RECTUM: ICD-10-CM

## 2021-10-15 DIAGNOSIS — Z29.9 ENCOUNTER FOR PROPHYLACTIC MEASURES, UNSPECIFIED: ICD-10-CM

## 2021-10-15 DIAGNOSIS — N40.0 BENIGN PROSTATIC HYPERPLASIA WITHOUT LOWER URINARY TRACT SYMPTOMS: ICD-10-CM

## 2021-10-15 DIAGNOSIS — I25.10 ATHEROSCLEROTIC HEART DISEASE OF NATIVE CORONARY ARTERY WITHOUT ANGINA PECTORIS: ICD-10-CM

## 2021-10-15 LAB
BASE EXCESS BLDV CALC-SCNC: 2.5 MMOL/L — SIGNIFICANT CHANGE UP (ref -2–3)
BLD GP AB SCN SERPL QL: NEGATIVE — SIGNIFICANT CHANGE UP
BLOOD GAS VENOUS COMPREHENSIVE RESULT: SIGNIFICANT CHANGE UP
CHLORIDE BLDV-SCNC: 103 MMOL/L — SIGNIFICANT CHANGE UP (ref 96–108)
CO2 BLDV-SCNC: 29.9 MMOL/L — HIGH (ref 22–26)
COVID-19 SPIKE DOMAIN AB INTERP: POSITIVE
COVID-19 SPIKE DOMAIN ANTIBODY RESULT: >250 U/ML — HIGH
FERRITIN SERPL-MCNC: 448 NG/ML — HIGH (ref 30–400)
GAS PNL BLDV: 136 MMOL/L — SIGNIFICANT CHANGE UP (ref 136–145)
GLUCOSE BLDV-MCNC: 133 MG/DL — HIGH (ref 70–99)
HCO3 BLDV-SCNC: 28 MMOL/L — SIGNIFICANT CHANGE UP (ref 22–29)
HCT VFR BLD CALC: 36.3 % — LOW (ref 39–50)
HCT VFR BLD CALC: 40.5 % — SIGNIFICANT CHANGE UP (ref 39–50)
HCT VFR BLD CALC: 41.6 % — SIGNIFICANT CHANGE UP (ref 39–50)
HCT VFR BLDA CALC: 41 % — SIGNIFICANT CHANGE UP (ref 39–51)
HGB BLD CALC-MCNC: 13.8 G/DL — SIGNIFICANT CHANGE UP (ref 13–17)
HGB BLD-MCNC: 12.2 G/DL — LOW (ref 13–17)
HGB BLD-MCNC: 13.6 G/DL — SIGNIFICANT CHANGE UP (ref 13–17)
HGB BLD-MCNC: 14.1 G/DL — SIGNIFICANT CHANGE UP (ref 13–17)
IRON SATN MFR SERPL: 15 % — SIGNIFICANT CHANGE UP (ref 14–50)
IRON SATN MFR SERPL: 45 UG/DL — SIGNIFICANT CHANGE UP (ref 45–165)
LACTATE BLDV-MCNC: 1.4 MMOL/L — SIGNIFICANT CHANGE UP (ref 0.5–2)
MCHC RBC-ENTMCNC: 27.4 PG — SIGNIFICANT CHANGE UP (ref 27–34)
MCHC RBC-ENTMCNC: 27.4 PG — SIGNIFICANT CHANGE UP (ref 27–34)
MCHC RBC-ENTMCNC: 27.6 PG — SIGNIFICANT CHANGE UP (ref 27–34)
MCHC RBC-ENTMCNC: 33.6 GM/DL — SIGNIFICANT CHANGE UP (ref 32–36)
MCHC RBC-ENTMCNC: 33.6 GM/DL — SIGNIFICANT CHANGE UP (ref 32–36)
MCHC RBC-ENTMCNC: 33.9 GM/DL — SIGNIFICANT CHANGE UP (ref 32–36)
MCV RBC AUTO: 81.6 FL — SIGNIFICANT CHANGE UP (ref 80–100)
MCV RBC AUTO: 81.6 FL — SIGNIFICANT CHANGE UP (ref 80–100)
MCV RBC AUTO: 81.7 FL — SIGNIFICANT CHANGE UP (ref 80–100)
NRBC # BLD: 0 /100 WBCS — SIGNIFICANT CHANGE UP
NRBC # FLD: 0 K/UL — SIGNIFICANT CHANGE UP
PCO2 BLDV: 48 MMHG — SIGNIFICANT CHANGE UP (ref 42–55)
PH BLDV: 7.38 — SIGNIFICANT CHANGE UP (ref 7.32–7.43)
PLATELET # BLD AUTO: 190 K/UL — SIGNIFICANT CHANGE UP (ref 150–400)
PLATELET # BLD AUTO: 191 K/UL — SIGNIFICANT CHANGE UP (ref 150–400)
PLATELET # BLD AUTO: 205 K/UL — SIGNIFICANT CHANGE UP (ref 150–400)
PO2 BLDV: 27 MMHG — SIGNIFICANT CHANGE UP
POTASSIUM BLDV-SCNC: 4.4 MMOL/L — SIGNIFICANT CHANGE UP (ref 3.5–5.1)
RBC # BLD: 4.45 M/UL — SIGNIFICANT CHANGE UP (ref 4.2–5.8)
RBC # BLD: 4.96 M/UL — SIGNIFICANT CHANGE UP (ref 4.2–5.8)
RBC # BLD: 5.1 M/UL — SIGNIFICANT CHANGE UP (ref 4.2–5.8)
RBC # FLD: 12.8 % — SIGNIFICANT CHANGE UP (ref 10.3–14.5)
RBC # FLD: 12.9 % — SIGNIFICANT CHANGE UP (ref 10.3–14.5)
RBC # FLD: 13.1 % — SIGNIFICANT CHANGE UP (ref 10.3–14.5)
RH IG SCN BLD-IMP: NEGATIVE — SIGNIFICANT CHANGE UP
SAO2 % BLDV: 46.5 % — SIGNIFICANT CHANGE UP
SARS-COV-2 IGG+IGM SERPL QL IA: >250 U/ML — HIGH
SARS-COV-2 IGG+IGM SERPL QL IA: POSITIVE
SARS-COV-2 RNA SPEC QL NAA+PROBE: SIGNIFICANT CHANGE UP
TIBC SERPL-MCNC: 307 UG/DL — SIGNIFICANT CHANGE UP (ref 220–430)
UIBC SERPL-MCNC: 262 UG/DL — SIGNIFICANT CHANGE UP (ref 110–370)
WBC # BLD: 10.28 K/UL — SIGNIFICANT CHANGE UP (ref 3.8–10.5)
WBC # BLD: 10.28 K/UL — SIGNIFICANT CHANGE UP (ref 3.8–10.5)
WBC # BLD: 7.48 K/UL — SIGNIFICANT CHANGE UP (ref 3.8–10.5)
WBC # FLD AUTO: 10.28 K/UL — SIGNIFICANT CHANGE UP (ref 3.8–10.5)
WBC # FLD AUTO: 10.28 K/UL — SIGNIFICANT CHANGE UP (ref 3.8–10.5)
WBC # FLD AUTO: 7.48 K/UL — SIGNIFICANT CHANGE UP (ref 3.8–10.5)

## 2021-10-15 PROCEDURE — 45334 SIGMOIDOSCOPY FOR BLEEDING: CPT | Mod: GC

## 2021-10-15 PROCEDURE — 99223 1ST HOSP IP/OBS HIGH 75: CPT | Mod: GC,25

## 2021-10-15 PROCEDURE — 74178 CT ABD&PLV WO CNTR FLWD CNTR: CPT | Mod: 26

## 2021-10-15 RX ORDER — CLOPIDOGREL BISULFATE 75 MG/1
75 TABLET, FILM COATED ORAL DAILY
Refills: 0 | Status: DISCONTINUED | OUTPATIENT
Start: 2021-10-16 | End: 2021-10-16

## 2021-10-15 RX ORDER — L.ACIDOPH/B.ANIMALIS/B.LONGUM 15B CELL
1 CAPSULE ORAL
Qty: 0 | Refills: 0 | DISCHARGE

## 2021-10-15 RX ORDER — METOPROLOL TARTRATE 50 MG
50 TABLET ORAL DAILY
Refills: 0 | Status: DISCONTINUED | OUTPATIENT
Start: 2021-10-15 | End: 2021-10-19

## 2021-10-15 RX ORDER — LEVOTHYROXINE SODIUM 125 MCG
75 TABLET ORAL DAILY
Refills: 0 | Status: DISCONTINUED | OUTPATIENT
Start: 2021-10-15 | End: 2021-10-19

## 2021-10-15 RX ORDER — ACETAMINOPHEN 500 MG
650 TABLET ORAL EVERY 6 HOURS
Refills: 0 | Status: DISCONTINUED | OUTPATIENT
Start: 2021-10-15 | End: 2021-10-19

## 2021-10-15 RX ORDER — METOPROLOL TARTRATE 50 MG
50 TABLET ORAL AT BEDTIME
Refills: 0 | Status: DISCONTINUED | OUTPATIENT
Start: 2021-10-15 | End: 2021-10-15

## 2021-10-15 RX ORDER — FINASTERIDE 5 MG/1
5 TABLET, FILM COATED ORAL DAILY
Refills: 0 | Status: DISCONTINUED | OUTPATIENT
Start: 2021-10-15 | End: 2021-10-19

## 2021-10-15 RX ORDER — SOD SULF/SODIUM/NAHCO3/KCL/PEG
4000 SOLUTION, RECONSTITUTED, ORAL ORAL ONCE
Refills: 0 | Status: COMPLETED | OUTPATIENT
Start: 2021-10-15 | End: 2021-10-15

## 2021-10-15 RX ORDER — PANTOPRAZOLE SODIUM 20 MG/1
40 TABLET, DELAYED RELEASE ORAL EVERY 12 HOURS
Refills: 0 | Status: DISCONTINUED | OUTPATIENT
Start: 2021-10-15 | End: 2021-10-19

## 2021-10-15 RX ORDER — SODIUM CHLORIDE 9 MG/ML
1000 INJECTION INTRAMUSCULAR; INTRAVENOUS; SUBCUTANEOUS
Refills: 0 | Status: DISCONTINUED | OUTPATIENT
Start: 2021-10-15 | End: 2021-10-16

## 2021-10-15 RX ORDER — ASPIRIN/CALCIUM CARB/MAGNESIUM 324 MG
81 TABLET ORAL DAILY
Refills: 0 | Status: DISCONTINUED | OUTPATIENT
Start: 2021-10-16 | End: 2021-10-19

## 2021-10-15 RX ORDER — ATORVASTATIN CALCIUM 80 MG/1
40 TABLET, FILM COATED ORAL AT BEDTIME
Refills: 0 | Status: DISCONTINUED | OUTPATIENT
Start: 2021-10-15 | End: 2021-10-19

## 2021-10-15 RX ADMIN — FINASTERIDE 5 MILLIGRAM(S): 5 TABLET, FILM COATED ORAL at 12:10

## 2021-10-15 RX ADMIN — ATORVASTATIN CALCIUM 40 MILLIGRAM(S): 80 TABLET, FILM COATED ORAL at 20:18

## 2021-10-15 RX ADMIN — PANTOPRAZOLE SODIUM 40 MILLIGRAM(S): 20 TABLET, DELAYED RELEASE ORAL at 01:06

## 2021-10-15 RX ADMIN — Medication 50 MILLIGRAM(S): at 20:18

## 2021-10-15 RX ADMIN — PANTOPRAZOLE SODIUM 40 MILLIGRAM(S): 20 TABLET, DELAYED RELEASE ORAL at 20:18

## 2021-10-15 RX ADMIN — Medication 75 MICROGRAM(S): at 06:26

## 2021-10-15 RX ADMIN — Medication 4000 MILLILITER(S): at 03:22

## 2021-10-15 RX ADMIN — SODIUM CHLORIDE 75 MILLILITER(S): 9 INJECTION INTRAMUSCULAR; INTRAVENOUS; SUBCUTANEOUS at 13:18

## 2021-10-15 NOTE — CHART NOTE - NSCHARTNOTEFT_GEN_A_CORE
Patient admitted for rectal bleeding x 1 day + occult.   CT A/P w/w/o IV contrast preliminary read + active extravasation in descending colon.   STAT CBC and 2nd Type and screen sent  Patient HDS   Surgery paged, awaiting further recs.   IR to be contacted by attending.     PA [facility] Medicine   Spectra [phone]

## 2021-10-15 NOTE — CONSULT NOTE ADULT - ASSESSMENT
71M w/ hx of CAD s/p stents (most recent Nov. 2020, first in 2009) on ASA/Plavix, HTN, HLD, hypothyroidism, BPH presenting with GI bleed localized to the descending colon.     - No acute surgical intervention. Patient has remained hemodynamically stable. Hgb on admission 14.9 -> 13.6.  - IR and GI consulted by primary team - f/u plans for possible embolization/colonoscopy  - Maintain large bore IV access, trend CBCs, transfuse as necessary    A team / Colorectal surgery  Pager [phone] 71M w/ hx of CAD s/p stents (most recent Nov. 2020, first in 2009) on ASA/Plavix, HTN, HLD, hypothyroidism, BPH presenting with GI bleed localized to the descending colon.     - No acute surgical intervention. Patient has remained hemodynamically stable. Hgb on admission 14.9 -> 13.6.  - IR and GI consulted by primary team - f/u plans for possible embolization/colonoscopy  - Maintain large bore IV access, trend CBCs, transfuse as necessary  - Discussed with attending, Dr. Patrick HAYNES team surgery  Pager [phone]

## 2021-10-15 NOTE — CONSULT NOTE ADULT - SUBJECTIVE AND OBJECTIVE BOX
SURGERY CONSULT NOTE  --------------------------------------------------------------------------------------------    HPI: 71M w/ hx of CAD s/p stents (most recent Nov. 2020, first in 2009) on ASA/Plavix, HTN, HLD, hypothyroidism, BPH presenting with 7 bloody bowel movements over the past 12 hours. He states this is the first time this has happened. Denies any pain or lightheadedness. His last colonoscopy was in 2016 which he reports showed diverticulitis and hemorrhoids. Denies any recent dark stools.    On presentation he was hemodynamically normal, Hgb 15.      PAST MEDICAL & SURGICAL HISTORY:  Coronary artery disease    Benign prostatic hypertrophy    HTN (hypertension)    Myocardial infarction acute  anterior wall 2009    CAD (coronary artery disease)    Stented coronary artery  endeavor x2 RCA 2009 - [facility]    No significant past surgical history    --------------------------------------------------------------------------------------------    Vitals:   T(C): 36.7 (10-15-21 @ 03:10), Max: 36.7 (10-14-21 @ 16:54)  HR: 60 (10-15-21 @ 03:10) (56 - 65)  BP: 146/94 (10-15-21 @ 03:10) (146/94 - 195/96)  RR: 18 (10-15-21 @ 03:10) (16 - 18)  SpO2: 100% (10-15-21 @ 03:10) (100% - 100%)      PHYSICAL EXAM:  General: Alert, NAD  Neuro: A+Ox3  HEENT: NC/AT  Cardio: RRR  Resp: Unlabored breathing  GI/Abd: Soft, nontender, nondistended  Ext: Warm, no edema    --------------------------------------------------------------------------------------------    LABS  CBC (10-15 @ 02:39)                              13.6                           7.48    )----------------(  190        --    % Neutrophils, --    % Lymphocytes, ANC: --                                  40.5    CBC (10-14 @ 19:41)                              14.9                           10.67<H>  )----------------(  212        81.1<H>% Neutrophils, 13.0  % Lymphocytes, ANC: 8.66<H>                              44.2      BMP (10-14 @ 19:41)             143     |  105     |  16    		Ca++ --      Ca 9.2                ---------------------------------( 127<H>		Mg --                 4.2     |  26      |  0.99  			Ph --        LFTs (10-14 @ 19:41)      TPro 7.6 / Alb 4.5 / TBili 0.8 / DBili -- / AST 22 / ALT 25 / AlkPhos 47    Coags (10-14 @ 19:41)  aPTT 32.3 / INR 1.08 / PT 12.4        VBG (10-15 @ 02:39)     7.38 / 48 / 27 / 28 / 2.5 / 46.5%     Lactate: 1.4    --------------------------------------------------------------------------------------------    IMAGING    CT Abdomen and Pelvis w/wo IV Cont (10.15.21 @ 01:52)  FINDINGS:  LOWER CHEST: Coronary artery calcification and/or stents.    LIVER: Within normal limits.  BILE DUCTS: Normal caliber.  GALLBLADDER: Within normal limits.  SPLEEN: Within normal limits.  PANCREAS: Within normal limits.  ADRENALS: Within normal limits.  KIDNEYS/URETERS: No renal stones or hydronephrosis.    BLADDER: Minimally distended.  REPRODUCTIVE ORGANS: Prostate is enlarged. Partially imaged metallic focus within the left hemiscrotum.    BOWEL: No bowel obstruction. Appendix is normal.. Colonic diverticulosis without evidence of diverticulitis. Active gastrointestinal hemorrhage within the distal descending colon (4:69).  PERITONEUM: No ascites.  VESSELS: Active extravasation as described above. Atherosclerotic calcifications.  RETROPERITONEUM/LYMPH NODES: No lymphadenopathy.  ABDOMINAL WALL: Tiny fat-containing umbilical hernia.  BONES: Degenerative changes.    IMPRESSION:  Active gastrointestinal hemorrhage within the distal descending colon.    Colonic diverticulosis.

## 2021-10-15 NOTE — CHART NOTE - NSCHARTNOTEFT_GEN_A_CORE
Brief IR Consult    -paged by medicine team regarding patient with BRBPR with positive CTA. case reviewed and imaging reviewed. CTA demonstrates diverticulosis with active GI bleeding present in the distal descending colon/proximal ascending colon. bleeding appears to localize to a diverticulum. patient is hemodynamically stable and is yet to receive any blood products. stat CBC just returned demonstrating a drop by approximately 1u with H/H now 13.6/40.5 and previously 14.9/44.2.    71M on DAPT with ASA/Plavix found to have acute GI bleeding on CTA which appears to be 2/2 diverticulosis who is hemodynamically stable and has not received any blood products to this point.    -recommend GI evaluation for colonoscopy with possible clipping  -no acute IR intervention at this time given stability of the patient and no blood products being transfused to this point  -if GI is unable perform colonoscopic evaluation with clipping and patient demonstrates signs of active bleeding despite conservative measures, contact IR in AM for possible mesenteric angiography/embolization  -case reviewed with IR attending Dr. Melendez and discussed with provider Rufino Morse  -IR to follow up in the morning with full consult    Adam Rowe MD  PGY-IV, Interventional Radiology  [facility]-[phone], [phone]  [facility]-[phone] ([phone]), [phone]

## 2021-10-15 NOTE — PROGRESS NOTE ADULT - ASSESSMENT
_________________________________________________________________________________________  ========>>  M E D I C A L   A T T E N D I N G    F O L L O W  U P  N O T E  <<=========  -----------------------------------------------------------------------------------------------------    - Patient seen and examined by me earlier today.   - In summary,  GONZÁLEZ WHITLOCK is a 71y year old man admitted with rectal bleed   - Patient today overall doing ok, comfortable, pt states bleeding seems to be subsiding in general  with occasional darker and sometimes bright blood in multiple BMs ( while consuming GI prep)     ==================>> REVIEW OF SYSTEM <<=================    GEN: no fever, no chills, no pain  RESP: no SOB, no cough, no sputum  CVS: no chest pain, no palpitations, no edema  GI: no abdominal pain, no nausea, as above   : no dysuria, no frequency, no hematuria  Neuro: no headache, no dizziness  Derm : no itching, no rash    ==================>> PHYSICAL EXAM <<=================    GEN: A&O X 3 , NAD , comfortable, pleasant, calm   HEENT: NCAT, PERRL, MMM, hearing intact  Neck: supple , no JVD appreciated  CVS: S1S2 , regular , No M/R/G appreciated  PULM: CTA B/L,  no W/R/R appreciated  ABD.: soft. non tender, non distended,  bowel sounds present  Extrem: intact pulses , no edema   PSYCH : normal mood,  not anxious                            ( Note Written / Date of service :  10-15-21 )    ==================>> MEDICATIONS <<====================    MEDICATIONS  (STANDING):  atorvastatin 40 milliGRAM(s) Oral at bedtime  finasteride 5 milliGRAM(s) Oral daily  levothyroxine 75 MICROGram(s) Oral daily  pantoprazole  Injectable 40 milliGRAM(s) IV Push every 12 hours  sodium chloride 0.9%. 1000 milliLiter(s) (75 mL/Hr) IV Continuous <Continuous>    MEDICATIONS  (PRN):  acetaminophen   Tablet .. 650 milliGRAM(s) Oral every 6 hours PRN Temp greater or equal to 38C (100.4F), Mild Pain (1 - 3)    ___________  Active diet:  Diet, NPO:   Except Medications  ___________________    ==================>> VITAL SIGNS <<==================    T(C): 36.6 (10-15-21 @ 15:20), Max: 36.7 (10-14-21 @ 16:54)  HR: 66 (10-15-21 @ 15:20) (56 - 78)  BP: 160/84 (10-15-21 @ 15:20) (146/94 - 195/96)  RR: 18 (10-15-21 @ 15:20) (16 - 18)  SpO2: 99% (10-15-21 @ 15:20) (99% - 100%)      ==================>> LAB AND IMAGING <<==================                        14.1   10.28 )-----------( 205      ( 15 Oct 2021 10:56 )             41.6        Hemoglobin:   14.1 <<==,  13.6 <<==,  14.9 <<==    10-14    143  |  105  |  16  ----------------------------<  127<H>  4.2   |  26  |  0.99    Ca    9.2      14 Oct 2021 19:41    TPro  7.6  /  Alb  4.5  /  TBili  0.8  /  DBili  x   /  AST  22  /  ALT  25  /  AlkPhos  47  10-14    PT/INR - ( 14 Oct 2021 19:41 )   PT: 12.4 sec;   INR: 1.08 ratio         PTT - ( 14 Oct 2021 19:41 )  PTT:32.3 sec              ___________________________________________________________________________________  ===============>>  A S S E S S M E N T   A N D   P L A N <<===============  ------------------------------------------------------------------------------------------    · Assessment      71M with PMHx CAD s/p stents (last 11/2020 to RCA on ASA/plavix), HTN, HLD, hypothyroidism, diverticulosis, BPH presenting with rectal bleeding x1 day. FOBT positive, BRBPR in ED    Problem/Plan - 1:  ·  Problem: Rectal bleeding.        BRBPR , multiple episodes, likely due to diverticular bleeding   CT scan noted  appreciated GI, Sx, IR consults overnight and coordination of night team  holding ASA and plavix until cleared by GI  plan for simoidoscopy  -PPI   -monitor CBC closely   blood transfusion if needed.  Problem/Plan - 2:  ·  Problem: CAD X 5 stents   ·  Plan: On ASA, plavix - hold for now  Cardiology on board   Last stent 11/2020 to RCA and cardiology rec'd one year of DAPT. However with continuing GIB risks of DAPT are high at this time.  statin, BB.  Problem/Plan - 3:  ·  Problem: HTN , poorly controlled   was off metoprolol XL 50mg qd.>> resumed  montior    Problem/Plan - 4:  ·  Problem: BPH (benign prostatic hyperplasia).   ·  Plan: Resume finasteride.    Problem/Plan - 5:  ·  Problem: Hypothyroidism.   ·  Plan: Resume levothyroxine 75mcg qd.    Problem/Plan - 6:  ·  Problem: Need for prophylactic measure.   ·  Plan: Patient is ambulatory so low risk for VTE. Hold chemical DVT ppx  diet as able per GI     --------------------------------------------  Case discussed with pt..   Education given on findings and plan of care  ___________________________  SUSHANT Roche D.O.  Pager: [phone]

## 2021-10-15 NOTE — CONSULT NOTE ADULT - ASSESSMENT
71M w/ hx of CAD s/p multiple stents, last 11/2020, on ASA and Plavix, presenting w/ several episodes of painless hematochezia. VSS, Hb remains 13-14. CTA positive for bleeding in descending colon.    Impression:  #Diverticular bleed - appears to have slowed down/stopped, but patient is very anxious to have evaluation to confirm lack of active bleeding.  #CAD s/p PCI on DAPT    Recommendations:  - Plan for unsedated flex sig today to exclude alternative etiologies of bleeding, possible therapy if we do see active bleeding, or to reassure patient that bleeding has stopped.  - NPO for now  - Trend Hb, transfuse as needed.  - mIVF while NPO  - No contraindication to ASA or Plavix from GI perspective at this time (given high risk of stopping either less than 1 year out from last stent)  - Patient will need full colonoscopy as outpatient with his private GI once he is able to hold Plavix for 3-5 days.    Shun Goss  Gastroenterology/Hepatology Fellow  Available via [employer] Teams    NON-URGENT CONSULTS:  Please email [email or web address]  AT NIGHT AND ON WEEKENDS:  Contact on-call GI fellow via answering service ([phone]) from 5pm-8am and on weekends/holidays  MONDAY-FRIDAY 8AM-5PM:  Pager# [phone] ([facility]) or [phone] ([facility])  GI Phone# [phone] ([facility])

## 2021-10-15 NOTE — CHART NOTE - NSCHARTNOTEFT_GEN_A_CORE
Prelim read of CT A/P with IV contrast with active extravasation in descending colon  Discussed with IR fellow and will discuss with IR attending but unlikely emergent intervention overnight given BP stable  Discussed with GI fellow who recommended to start golytely bowel prep and will see in AM  General surgery also to see patient, pending recs  Repeat CBC pending Prelim read of CT A/P with IV contrast with active extravasation in descending colon  Discussed with IR fellow who discussed with attending and no emergent intervention at this time given hgb now at 13.6 and HD stable. Defer to GI evaluation  Discussed with GI fellow who recommended to start golytely bowel prep and will see in AM. No emergent intervention overnight given HD stable.  General surgery also to see patient, pending recs

## 2021-10-15 NOTE — CONSULT NOTE ADULT - SUBJECTIVE AND OBJECTIVE BOX
Chief Complaint:  Patient is a 71y old  Male who presents with a chief complaint of rectal bleeding (15 Oct 2021 03:11)      HPI:GONZÁLEZ WHITLOCK is a 71y Male w/ hx of CAD s/p multiple stents (last 11/2020) on ASA and Plavix, presenting w/ approx 10 episodes of painless hematochezia. Patient last had a colonoscopy in 2016.     PMHX/PSHX:  Coronary artery disease    Benign prostatic hypertrophy    HTN (hypertension)    Myocardial infarction acute    CAD (coronary artery disease)    Stented coronary artery    No significant past surgical history      Allergies:  No Known Allergies      Home Medications: reviewed  Hospital Medications:  acetaminophen   Tablet .. 650 milliGRAM(s) Oral every 6 hours PRN  atorvastatin 40 milliGRAM(s) Oral at bedtime  finasteride 5 milliGRAM(s) Oral daily  levothyroxine 75 MICROGram(s) Oral daily  pantoprazole  Injectable 40 milliGRAM(s) IV Push every 12 hours      Social History:   Tob: Denies  EtOH: Denies  Illicit Drugs: Denies    Family history:  No pertinent family history in first degree relatives    Family history of stent (Mother)      Denies family history of colon cancer/polyps, stomach cancer/polyps, pancreatic cancer/masses, liver cancer/disease, ovarian cancer and endometrial cancer.    ROS:   General:  No  fevers, chills, night sweats, fatigue  Eyes:  Good vision, no reported pain  ENT:  No sore throat, pain, runny nose  CV:  No pain, palpitations  Pulm:  No dyspnea, cough  GI:  See HPI, otherwise negative  :  No  incontinence, nocturia  Muscle:  No pain, weakness  Neuro:  No memory problems  Psych:  No insomnia, mood problems, depression  Endocrine:  No polyuria, polydipsia, cold/heat intolerance  Heme:  No petechiae, ecchymosis, easy bruisability  Skin:  No rash    PHYSICAL EXAM:   Vital Signs:  Vital Signs Last 24 Hrs  T(C): 36.4 (15 Oct 2021 10:28), Max: 36.7 (14 Oct 2021 16:54)  T(F): 97.6 (15 Oct 2021 10:28), Max: 98.1 (15 Oct 2021 03:10)  HR: 78 (15 Oct 2021 10:28) (56 - 78)  BP: 178/89 (15 Oct 2021 10:28) (146/94 - 195/96)  BP(mean): --  RR: 18 (15 Oct 2021 10:28) (16 - 18)  SpO2: 100% (15 Oct 2021 10:28) (100% - 100%)  Daily Height in cm: 170.18 (14 Oct 2021 16:54)    Daily     GENERAL: no acute distress  NEURO: alert  HEENT: anicteric sclera, no conjunctival pallor appreciated  CHEST: no respiratory distress, no accessory muscle use  CARDIAC: regular rate, rhythm  ABDOMEN: soft, non-tender, non-distended, no rebound or guarding  EXTREMITIES: warm, well perfused, no edema  SKIN: no lesions noted    LABS: reviewed                        14.1   10.28 )-----------( 205      ( 15 Oct 2021 10:56 )             41.6     10-14    143  |  105  |  16  ----------------------------<  127<H>  4.2   |  26  |  0.99    Ca    9.2      14 Oct 2021 19:41    TPro  7.6  /  Alb  4.5  /  TBili  0.8  /  DBili  x   /  AST  22  /  ALT  25  /  AlkPhos  47  10-14    LIVER FUNCTIONS - ( 14 Oct 2021 19:41 )  Alb: 4.5 g/dL / Pro: 7.6 g/dL / ALK PHOS: 47 U/L / ALT: 25 U/L / AST: 22 U/L / GGT: x               Diagnostic Studies: see sunrise for full report         Chief Complaint:  Patient is a 71y old  Male who presents with a chief complaint of rectal bleeding (15 Oct 2021 03:11)      HPI:GONZÁLEZ WHITLOCK is a 71y Male w/ hx of CAD s/p multiple stents (last 11/2020) on ASA and Plavix, presenting w/ approx 10 episodes of painless hematochezia. Patient last had a colonoscopy in 2016 notable for diverticulosis. Patient has been having change in bowel habits over last few months with a few days of non-bloody diarrhea every month. Had blood with wiping 1-2 years ago and had a flex sig showing anal fissure. Has not seen blood since then.    GI ROS negative for weight loss, f/c, dysphagia, odynophagia, early satiety, poor PO intake, abd pain, nausea, vomiting, melena, change in stool caliber, family history of GI cancers.    PMHX/PSHX:  Coronary artery disease    Benign prostatic hypertrophy    HTN (hypertension)    Myocardial infarction acute    CAD (coronary artery disease)    Stented coronary artery    No significant past surgical history      Allergies:  No Known Allergies      Home Medications: reviewed  Hospital Medications:  acetaminophen   Tablet .. 650 milliGRAM(s) Oral every 6 hours PRN  atorvastatin 40 milliGRAM(s) Oral at bedtime  finasteride 5 milliGRAM(s) Oral daily  levothyroxine 75 MICROGram(s) Oral daily  pantoprazole  Injectable 40 milliGRAM(s) IV Push every 12 hours      Social History:   Tob: Denies  EtOH: Denies  Illicit Drugs: Denies    Family history:  No pertinent family history in first degree relatives    Family history of stent (Mother)      Denies family history of colon cancer/polyps, stomach cancer/polyps, pancreatic cancer/masses, liver cancer/disease, ovarian cancer and endometrial cancer.    ROS:   General:  No  fevers, chills, night sweats, fatigue  Eyes:  Good vision, no reported pain  ENT:  No sore throat, pain, runny nose  CV:  No pain, palpitations  Pulm:  No dyspnea, cough  GI:  See HPI, otherwise negative  :  No  incontinence, nocturia  Muscle:  No pain, weakness  Neuro:  No memory problems  Psych:  No insomnia, mood problems, depression  Endocrine:  No polyuria, polydipsia, cold/heat intolerance  Heme:  No petechiae, ecchymosis, easy bruisability  Skin:  No rash    PHYSICAL EXAM:   Vital Signs:  Vital Signs Last 24 Hrs  T(C): 36.4 (15 Oct 2021 10:28), Max: 36.7 (14 Oct 2021 16:54)  T(F): 97.6 (15 Oct 2021 10:28), Max: 98.1 (15 Oct 2021 03:10)  HR: 78 (15 Oct 2021 10:28) (56 - 78)  BP: 178/89 (15 Oct 2021 10:28) (146/94 - 195/96)  BP(mean): --  RR: 18 (15 Oct 2021 10:28) (16 - 18)  SpO2: 100% (15 Oct 2021 10:28) (100% - 100%)  Daily Height in cm: 170.18 (14 Oct 2021 16:54)    Daily     GENERAL: no acute distress  NEURO: alert  HEENT: anicteric sclera, no conjunctival pallor appreciated  CHEST: no respiratory distress, no accessory muscle use  CARDIAC: regular rate, rhythm  ABDOMEN: soft, non-tender, non-distended, no rebound or guarding  EXTREMITIES: warm, well perfused, no edema  SKIN: no lesions noted    LABS: reviewed                        14.1   10.28 )-----------( 205      ( 15 Oct 2021 10:56 )             41.6     10-14    143  |  105  |  16  ----------------------------<  127<H>  4.2   |  26  |  0.99    Ca    9.2      14 Oct 2021 19:41    TPro  7.6  /  Alb  4.5  /  TBili  0.8  /  DBili  x   /  AST  22  /  ALT  25  /  AlkPhos  47  10-14    LIVER FUNCTIONS - ( 14 Oct 2021 19:41 )  Alb: 4.5 g/dL / Pro: 7.6 g/dL / ALK PHOS: 47 U/L / ALT: 25 U/L / AST: 22 U/L / GGT: x               Diagnostic Studies: see sunrise for full report         Chief Complaint:  Patient is a 71y old  Male who presents with a chief complaint of rectal bleeding (15 Oct 2021 03:11)      HPI:GONZÁLEZ WHITLOCK is a 71y Male w/ hx of CAD s/p multiple stents (last 11/2020) on ASA and Plavix, presenting w/ approx 10 episodes of painless hematochezia. Patient last had a colonoscopy in 2016 notable for diverticulosis. Patient has been having change in bowel habits over last few months with a few days of non-bloody diarrhea every month. Had blood with wiping 1-2 years ago and had a flex sig showing anal fissure. Has not seen blood since then.    GI ROS negative for weight loss, f/c, dysphagia, odynophagia, early satiety, poor PO intake, abd pain, nausea, vomiting, melena, change in stool caliber, family history of GI cancers.    PMHX/PSHX:  Coronary artery disease    Benign prostatic hypertrophy    HTN (hypertension)    Myocardial infarction acute    CAD (coronary artery disease)    Stented coronary artery    No significant past surgical history      Allergies:  No Known Allergies      Home Medications: reviewed  Hospital Medications:  acetaminophen   Tablet .. 650 milliGRAM(s) Oral every 6 hours PRN  atorvastatin 40 milliGRAM(s) Oral at bedtime  finasteride 5 milliGRAM(s) Oral daily  levothyroxine 75 MICROGram(s) Oral daily  pantoprazole  Injectable 40 milliGRAM(s) IV Push every 12 hours      Social History:   Tob: Denies  EtOH: Denies  Illicit Drugs: Denies    Family history:  No pertinent family history in first degree relatives    Family history of stent (Mother)      Denies family history of colon cancer/polyps, stomach cancer/polyps, pancreatic cancer/masses, liver cancer/disease, ovarian cancer and endometrial cancer.    ROS:   General:  No  fevers, chills, night sweats, fatigue  Eyes:  Good vision, no reported pain  ENT:  No sore throat, pain, runny nose  CV:  No pain, palpitations  Pulm:  No dyspnea, cough  GI:  See HPI, otherwise negative  :  No  incontinence, nocturia  Muscle:  No pain, weakness  Neuro:  No memory problems  Psych:  No insomnia, mood problems, depression  Endocrine:  No polyuria, polydipsia, cold/heat intolerance  Heme:  No petechiae, ecchymosis, easy bruisability  Skin:  No rash    PHYSICAL EXAM:   Vital Signs:  Vital Signs Last 24 Hrs  T(C): 36.4 (15 Oct 2021 10:28), Max: 36.7 (14 Oct 2021 16:54)  T(F): 97.6 (15 Oct 2021 10:28), Max: 98.1 (15 Oct 2021 03:10)  HR: 78 (15 Oct 2021 10:28) (56 - 78)  BP: 178/89 (15 Oct 2021 10:28) (146/94 - 195/96)  BP(mean): --  RR: 18 (15 Oct 2021 10:28) (16 - 18)  SpO2: 100% (15 Oct 2021 10:28) (100% - 100%)  Daily Height in cm: 170.18 (14 Oct 2021 16:54)    Daily     GENERAL: no acute distress  NEURO: alert  HEENT: anicteric sclera, no conjunctival pallor appreciated  NECK: supple  CHEST: no respiratory distress, no accessory muscle use  CARDIAC: regular rate, rhythm  ABDOMEN: soft, non-tender, non-distended, no rebound or guarding  EXTREMITIES: warm, well perfused, no edema  SKIN: no lesions noted  PSYCH: Normal affect    LABS: reviewed                        14.1   10.28 )-----------( 205      ( 15 Oct 2021 10:56 )             41.6     10-14    143  |  105  |  16  ----------------------------<  127<H>  4.2   |  26  |  0.99    Ca    9.2      14 Oct 2021 19:41    TPro  7.6  /  Alb  4.5  /  TBili  0.8  /  DBili  x   /  AST  22  /  ALT  25  /  AlkPhos  47  10-14    LIVER FUNCTIONS - ( 14 Oct 2021 19:41 )  Alb: 4.5 g/dL / Pro: 7.6 g/dL / ALK PHOS: 47 U/L / ALT: 25 U/L / AST: 22 U/L / GGT: x               Diagnostic Studies: see sunrise for full report

## 2021-10-15 NOTE — CHART NOTE - NSCHARTNOTEFT_GEN_A_CORE
Pt seen and examined.    Plan for flex sig today.  Keep NPO. No more prep.    Full consult note to follow.     Shun Goss  Gastroenterology/Hepatology Fellow  Available via [employer] Teams    NON-URGENT CONSULTS:  Please email [email or web address]  AT NIGHT AND ON WEEKENDS:  Contact on-call GI fellow via answering service ([phone]) from 5pm-8am and on weekends/holidays  MONDAY-FRIDAY 8AM-5PM:  Pager# [phone] ([facility]) or [phone] ([facility])  GI Phone# [phone] ([facility])

## 2021-10-16 LAB
ANION GAP SERPL CALC-SCNC: 12 MMOL/L — SIGNIFICANT CHANGE UP (ref 7–14)
ANION GAP SERPL CALC-SCNC: 20 MMOL/L — HIGH (ref 7–14)
BUN SERPL-MCNC: 14 MG/DL — SIGNIFICANT CHANGE UP (ref 7–23)
BUN SERPL-MCNC: 14 MG/DL — SIGNIFICANT CHANGE UP (ref 7–23)
CALCIUM SERPL-MCNC: 8.9 MG/DL — SIGNIFICANT CHANGE UP (ref 8.4–10.5)
CALCIUM SERPL-MCNC: 9.1 MG/DL — SIGNIFICANT CHANGE UP (ref 8.4–10.5)
CHLORIDE SERPL-SCNC: 104 MMOL/L — SIGNIFICANT CHANGE UP (ref 98–107)
CHLORIDE SERPL-SCNC: 99 MMOL/L — SIGNIFICANT CHANGE UP (ref 98–107)
CK MB BLD-MCNC: 2.5 % — SIGNIFICANT CHANGE UP (ref 0–2.5)
CK MB CFR SERPL CALC: 5.4 NG/ML — SIGNIFICANT CHANGE UP
CK SERPL-CCNC: 215 U/L — HIGH (ref 30–200)
CO2 SERPL-SCNC: 22 MMOL/L — SIGNIFICANT CHANGE UP (ref 22–31)
CO2 SERPL-SCNC: 23 MMOL/L — SIGNIFICANT CHANGE UP (ref 22–31)
CREAT SERPL-MCNC: 0.9 MG/DL — SIGNIFICANT CHANGE UP (ref 0.5–1.3)
CREAT SERPL-MCNC: 0.94 MG/DL — SIGNIFICANT CHANGE UP (ref 0.5–1.3)
GLUCOSE BLDC GLUCOMTR-MCNC: 140 MG/DL — HIGH (ref 70–99)
GLUCOSE SERPL-MCNC: 157 MG/DL — HIGH (ref 70–99)
GLUCOSE SERPL-MCNC: 163 MG/DL — HIGH (ref 70–99)
HCT VFR BLD CALC: 34.7 % — LOW (ref 39–50)
HCT VFR BLD CALC: 35.5 % — LOW (ref 39–50)
HCT VFR BLD CALC: 36.8 % — LOW (ref 39–50)
HGB BLD-MCNC: 11.8 G/DL — LOW (ref 13–17)
HGB BLD-MCNC: 12 G/DL — LOW (ref 13–17)
HGB BLD-MCNC: 12.8 G/DL — LOW (ref 13–17)
MAGNESIUM SERPL-MCNC: 2 MG/DL — SIGNIFICANT CHANGE UP (ref 1.6–2.6)
MAGNESIUM SERPL-MCNC: 2 MG/DL — SIGNIFICANT CHANGE UP (ref 1.6–2.6)
MCHC RBC-ENTMCNC: 27.4 PG — SIGNIFICANT CHANGE UP (ref 27–34)
MCHC RBC-ENTMCNC: 27.7 PG — SIGNIFICANT CHANGE UP (ref 27–34)
MCHC RBC-ENTMCNC: 28 PG — SIGNIFICANT CHANGE UP (ref 27–34)
MCHC RBC-ENTMCNC: 33.8 GM/DL — SIGNIFICANT CHANGE UP (ref 32–36)
MCHC RBC-ENTMCNC: 34 GM/DL — SIGNIFICANT CHANGE UP (ref 32–36)
MCHC RBC-ENTMCNC: 34.8 GM/DL — SIGNIFICANT CHANGE UP (ref 32–36)
MCV RBC AUTO: 80.5 FL — SIGNIFICANT CHANGE UP (ref 80–100)
MCV RBC AUTO: 81.1 FL — SIGNIFICANT CHANGE UP (ref 80–100)
MCV RBC AUTO: 81.5 FL — SIGNIFICANT CHANGE UP (ref 80–100)
NRBC # BLD: 0 /100 WBCS — SIGNIFICANT CHANGE UP
NRBC # FLD: 0 K/UL — SIGNIFICANT CHANGE UP
PHOSPHATE SERPL-MCNC: 2.6 MG/DL — SIGNIFICANT CHANGE UP (ref 2.5–4.5)
PHOSPHATE SERPL-MCNC: 2.9 MG/DL — SIGNIFICANT CHANGE UP (ref 2.5–4.5)
PLATELET # BLD AUTO: 187 K/UL — SIGNIFICANT CHANGE UP (ref 150–400)
PLATELET # BLD AUTO: 188 K/UL — SIGNIFICANT CHANGE UP (ref 150–400)
PLATELET # BLD AUTO: 190 K/UL — SIGNIFICANT CHANGE UP (ref 150–400)
POTASSIUM SERPL-MCNC: 3.7 MMOL/L — SIGNIFICANT CHANGE UP (ref 3.5–5.3)
POTASSIUM SERPL-MCNC: 3.8 MMOL/L — SIGNIFICANT CHANGE UP (ref 3.5–5.3)
POTASSIUM SERPL-SCNC: 3.7 MMOL/L — SIGNIFICANT CHANGE UP (ref 3.5–5.3)
POTASSIUM SERPL-SCNC: 3.8 MMOL/L — SIGNIFICANT CHANGE UP (ref 3.5–5.3)
RBC # BLD: 4.26 M/UL — SIGNIFICANT CHANGE UP (ref 4.2–5.8)
RBC # BLD: 4.38 M/UL — SIGNIFICANT CHANGE UP (ref 4.2–5.8)
RBC # BLD: 4.57 M/UL — SIGNIFICANT CHANGE UP (ref 4.2–5.8)
RBC # FLD: 13 % — SIGNIFICANT CHANGE UP (ref 10.3–14.5)
SODIUM SERPL-SCNC: 138 MMOL/L — SIGNIFICANT CHANGE UP (ref 135–145)
SODIUM SERPL-SCNC: 142 MMOL/L — SIGNIFICANT CHANGE UP (ref 135–145)
TROPONIN T, HIGH SENSITIVITY RESULT: 15 NG/L — SIGNIFICANT CHANGE UP
WBC # BLD: 11.91 K/UL — HIGH (ref 3.8–10.5)
WBC # BLD: 8.37 K/UL — SIGNIFICANT CHANGE UP (ref 3.8–10.5)
WBC # BLD: 9.37 K/UL — SIGNIFICANT CHANGE UP (ref 3.8–10.5)
WBC # FLD AUTO: 11.91 K/UL — HIGH (ref 3.8–10.5)
WBC # FLD AUTO: 8.37 K/UL — SIGNIFICANT CHANGE UP (ref 3.8–10.5)
WBC # FLD AUTO: 9.37 K/UL — SIGNIFICANT CHANGE UP (ref 3.8–10.5)

## 2021-10-16 PROCEDURE — 71045 X-RAY EXAM CHEST 1 VIEW: CPT | Mod: 26

## 2021-10-16 PROCEDURE — 71250 CT THORAX DX C-: CPT | Mod: 26

## 2021-10-16 PROCEDURE — 70450 CT HEAD/BRAIN W/O DYE: CPT | Mod: 26

## 2021-10-16 PROCEDURE — 99232 SBSQ HOSP IP/OBS MODERATE 35: CPT | Mod: GC

## 2021-10-16 PROCEDURE — 93010 ELECTROCARDIOGRAM REPORT: CPT

## 2021-10-16 RX ORDER — SODIUM CHLORIDE 9 MG/ML
1000 INJECTION INTRAMUSCULAR; INTRAVENOUS; SUBCUTANEOUS
Refills: 0 | Status: DISCONTINUED | OUTPATIENT
Start: 2021-10-16 | End: 2021-10-19

## 2021-10-16 RX ORDER — LIDOCAINE HCL 20 MG/ML
10 VIAL (ML) INJECTION ONCE
Refills: 0 | Status: COMPLETED | OUTPATIENT
Start: 2021-10-16 | End: 2021-10-16

## 2021-10-16 RX ORDER — TETANUS AND DIPHTHERIA TOXOIDS ADSORBED 2; 2 [LF]/.5ML; [LF]/.5ML
0.5 INJECTION INTRAMUSCULAR ONCE
Refills: 0 | Status: COMPLETED | OUTPATIENT
Start: 2021-10-16 | End: 2021-10-16

## 2021-10-16 RX ORDER — ONDANSETRON 8 MG/1
4 TABLET, FILM COATED ORAL ONCE
Refills: 0 | Status: COMPLETED | OUTPATIENT
Start: 2021-10-16 | End: 2021-10-16

## 2021-10-16 RX ADMIN — Medication 50 MILLIGRAM(S): at 17:29

## 2021-10-16 RX ADMIN — PANTOPRAZOLE SODIUM 40 MILLIGRAM(S): 20 TABLET, DELAYED RELEASE ORAL at 17:29

## 2021-10-16 RX ADMIN — TETANUS AND DIPHTHERIA TOXOIDS ADSORBED 0.5 MILLILITER(S): 2; 2 INJECTION INTRAMUSCULAR at 15:55

## 2021-10-16 RX ADMIN — Medication 650 MILLIGRAM(S): at 17:29

## 2021-10-16 RX ADMIN — Medication 81 MILLIGRAM(S): at 11:51

## 2021-10-16 RX ADMIN — SODIUM CHLORIDE 65 MILLILITER(S): 9 INJECTION INTRAMUSCULAR; INTRAVENOUS; SUBCUTANEOUS at 11:51

## 2021-10-16 RX ADMIN — ATORVASTATIN CALCIUM 40 MILLIGRAM(S): 80 TABLET, FILM COATED ORAL at 21:46

## 2021-10-16 RX ADMIN — FINASTERIDE 5 MILLIGRAM(S): 5 TABLET, FILM COATED ORAL at 11:51

## 2021-10-16 RX ADMIN — Medication 75 MICROGRAM(S): at 05:09

## 2021-10-16 RX ADMIN — ONDANSETRON 4 MILLIGRAM(S): 8 TABLET, FILM COATED ORAL at 04:12

## 2021-10-16 RX ADMIN — Medication 10 MILLILITER(S): at 15:42

## 2021-10-16 RX ADMIN — Medication 30 MILLILITER(S): at 04:12

## 2021-10-16 RX ADMIN — PANTOPRAZOLE SODIUM 40 MILLIGRAM(S): 20 TABLET, DELAYED RELEASE ORAL at 04:50

## 2021-10-16 NOTE — PROGRESS NOTE ADULT - SUBJECTIVE AND OBJECTIVE BOX
Patient has remained hemodynamically stable. Underwent flex sig yesterday which revealed active bleeding which was clipped.   Patient reports BM this morning with only a few small old appearing clots.   This morning, patient with syncopal fall after nausea while brushing teeth.   CT head without intracranial hemorrhage.     Vital Signs Last 24 Hrs  T(C): 37.1 (16 Oct 2021 09:37), Max: 37.1 (16 Oct 2021 09:37)  T(F): 98.7 (16 Oct 2021 09:37), Max: 98.7 (16 Oct 2021 09:37)  HR: 68 (16 Oct 2021 09:37) (62 - 76)  BP: 127/57 (16 Oct 2021 09:37) (119/74 - 170/74)  RR: 17 (16 Oct 2021 09:37) (16 - 20)  SpO2: 100% (16 Oct 2021 09:37) (97% - 100%)    Small nonbleeding laceration above left eyebrow  No c-spine TTP to palpation   Mild pain over sternum and left chest wall, no ecchymosis, no crepitus   Abd soft, NT, ND   Pelvis stable.   Able to move all four extremities easily without pain.  No gross deformities   No T or L spine TTP, no step-offs                           12.0   11.91 )-----------( 188      ( 16 Oct 2021 06:28 )             35.5      negative...

## 2021-10-16 NOTE — CONSULT NOTE ADULT - SUBJECTIVE AND OBJECTIVE BOX
CHIEF COMPLAINT: syncope, gi bleed    HISTORY OF PRESENT ILLNESS:  71M with PMHx CAD s/p stents (last 11/2020 to RCA on ASA/plavix), HTN, HLD, hypothyroidism, diverticulosis, BPH presenting with rectal bleeding x1 day. Pt called his GI doctor and was told to come to ED. Pt at baseline does not have rectal bleeding other than years ago once when wiping and was bright red. He believes his last C-scope was 2016 and noted to have diverticulosis in the past but no prior bleeding. Today while patient was at work he had multiple episodes of BRBPR (total 7 episodes today with 2 in the ED). The first episode had a small formed stool that was black in color but further episodes were bright red and without black stool. Over the last two months he has been experiencing intermittent lower abdominal cramping and stools alternate between formed and diarrhea. Currently he does not have abdominal pain, vomiting, diarrhea, fevers, chills, LH, LOC, dizziness. Pt is compliant with his ASA and plavix. He follows with Dr. Preciado of cardiology. Denies CP or SOB.    In ED, FOBT positive, BRBPR episodes. HD stable. Hypertensive to 170s. Hgb normal  Patient noted to have another BRBPR episode ~1240AM.   s/p simoidoscopy with e/o diverticular bleed s/p clipping  this am syncopal episode c/b witnessed fall.  Pt endorsed feeling nauseous prior to episode. Per primary team who witnessed fall, pt hit his head on the ground. Upon being placed back in bed, was noted to have an episode of decreased consciousness; "staring" at nothing, also seemed "stiff" per primary team. Upon arrival at RRT, pt HD stable. FS WNL. AAOx4, without focal neurological deficits. Communicating in clear coherent sentences. Remained HD stable during RRT. EKG w. NSR.      Allergies    No Known Allergies    Intolerances    	    MEDICATIONS:  aspirin enteric coated 81 milliGRAM(s) Oral daily  clopidogrel Tablet 75 milliGRAM(s) Oral daily  metoprolol succinate ER 50 milliGRAM(s) Oral daily        acetaminophen   Tablet .. 650 milliGRAM(s) Oral every 6 hours PRN    pantoprazole  Injectable 40 milliGRAM(s) IV Push every 12 hours    atorvastatin 40 milliGRAM(s) Oral at bedtime  finasteride 5 milliGRAM(s) Oral daily  levothyroxine 75 MICROGram(s) Oral daily    sodium chloride 0.9%. 1000 milliLiter(s) IV Continuous <Continuous>      PAST MEDICAL & SURGICAL HISTORY:  Coronary artery disease    Benign prostatic hypertrophy    HTN (hypertension)    Myocardial infarction acute  anterior wall 2009    CAD (coronary artery disease)    Stented coronary artery  endeavor x2 RCA 2009 - [facility]    No significant past surgical history        FAMILY HISTORY:  Family history of stent (Mother)  mother living with coronary stents placed at 79yo        SOCIAL HISTORY:    non smoker. indep in adl      REVIEW OF SYSTEMS:  See HPI, otherwise complete 10 point review of systems negative    [ ] All others negative	      PHYSICAL EXAM:  T(C): 37 (10-16-21 @ 05:20), Max: 37 (10-15-21 @ 17:55)  HR: 67 (10-16-21 @ 05:20) (62 - 76)  BP: 137/67 (10-16-21 @ 05:20) (119/74 - 170/74)  RR: 17 (10-16-21 @ 05:20) (16 - 20)  SpO2: 100% (10-16-21 @ 05:20) (97% - 100%)  Wt(kg): --  I&O's Summary      Appearance: No Acute Distress	  HEENT:  Normal oral mucosa, PERRL, EOMI	  Cardiovascular: Normal S1 S2, No JVD, No murmurs/rubs/gallops  Respiratory: Lungs clear to auscultation bilaterally  Gastrointestinal:  Soft, Non-tender, + BS	  Skin: No rashes, No ecchymoses, No cyanosis	  Neurologic: Non-focal  Extremities: No clubbing, cyanosis or edema  Vascular: Peripheral pulses palpable 2+ bilaterally  Psychiatry: A & O x 3, Mood & affect appropriate    Laboratory Data:	 	    CBC Full  -  ( 16 Oct 2021 06:28 )  WBC Count : 11.91 K/uL  Hemoglobin : 12.0 g/dL  Hematocrit : 35.5 %  Platelet Count - Automated : 188 K/uL  Mean Cell Volume : 81.1 fL  Mean Cell Hemoglobin : 27.4 pg  Mean Cell Hemoglobin Concentration : 33.8 gm/dL  Auto Neutrophil # : x  Auto Lymphocyte # : x  Auto Monocyte # : x  Auto Eosinophil # : x  Auto Basophil # : x  Auto Neutrophil % : x  Auto Lymphocyte % : x  Auto Monocyte % : x  Auto Eosinophil % : x  Auto Basophil % : x    10-16    142  |  99  |  14  ----------------------------<  163<H>  3.8   |  23  |  0.90  10-16    138  |  104  |  14  ----------------------------<  157<H>  3.7   |  22  |  0.94    Ca    8.9      16 Oct 2021 06:28  Ca    9.1      16 Oct 2021 04:16  Phos  2.6     10-16  Phos  2.9     10-16  Mg     2.00     10-16  Mg     2.00     10-16    TPro  7.6  /  Alb  4.5  /  TBili  0.8  /  DBili  x   /  AST  22  /  ALT  25  /  AlkPhos  47  10-14      proBNP:   Lipid Profile:   HgA1c:   TSH:       CARDIAC MARKERS:            Interpretation of Telemetry: 	    ECG:  	nsr no singificant st/t changes  RADIOLOGY:  OTHER: 	    PREVIOUS DIAGNOSTIC TESTING:    [ ] Echocardiogram:  [ ] Catheterization:  [ ] Stress Test:  	    Assessment:  -syncope  -diverticular bleed  -hx of NSTEMI/CAD s/p prior stents (most recently to dRCA in 11/2020)  -HTN    Recs:  cv stable  syncope appears vagal possibly in setting of hypovolemia and recent GI bleed. also have to take into context hx of stable ischemic heart disease and baseline ecg with LAFB and nonspecific IVCD --> would monitor on telemetery, obtain 2d echo, f/u head ct, check orthostatics  resume asa 81mg and statin., hold plavix indefinitely. last stent ~ 11 months ago to dRCA  f/u gi recs        Greater than 60 minutes spent on total encounter; more than 50% of the visit was spent counseling and/or coordinating care by the attending physician.   	  Donnie Preciado MD   Cardiovascular Diseases  [phone]

## 2021-10-16 NOTE — CHART NOTE - NSCHARTNOTEFT_GEN_A_CORE
RRT called for syncopal episode with traumatic fall. Pt with a 2in laceration above right eyelid.     Plan:   -F/u labs  -CTH ordered   -Echo ordered RRT called for syncopal episode with traumatic witnessed fall.  Pt reported feeling nauseous right before fall as if he wanted to vomit. Fell head first hitting the ground. Now c/o of right sided under the breast bone pain that is tender to palpation. Admits to previous syncopal episode in the past when he had to get stents placed. Denies any CP, dizziness, lightheadedness, abd pain.     Vital Signs Last 24 Hrs  T(C): 36.8 (15 Oct 2021 23:52), Max: 37 (15 Oct 2021 17:55)  T(F): 98.2 (15 Oct 2021 23:52), Max: 98.6 (15 Oct 2021 17:55)  HR: 62 (15 Oct 2021 23:52) (62 - 78)  BP: 119/74 (15 Oct 2021 23:52) (119/74 - 178/89)  RR: 17 (15 Oct 2021 23:52) (16 - 20)  SpO2: 100% (15 Oct 2021 23:52) (97% - 100%)      Plan:   -CTH ordered   -Chest X-ray   -F/u labs: BMP, CBC, CE  -Echo ordered  -Transfer to Tele for monitoring RRT called for syncopal episode with traumatic witnessed fall.  Pt reported feeling nauseous right before fall as if he wanted to vomit. Fell head first hitting the ground. Now c/o of right sided under the breast bone pain that is tender to palpation. Admits to previous syncopal episode in the past when he had to get stents placed. Denies any CP, dizziness, lightheadedness, abd pain.     Vital Signs Last 24 Hrs  T(C): 36.8 (15 Oct 2021 23:52), Max: 37 (15 Oct 2021 17:55)  T(F): 98.2 (15 Oct 2021 23:52), Max: 98.6 (15 Oct 2021 17:55)  HR: 62 (15 Oct 2021 23:52) (62 - 78)  BP: 119/74 (15 Oct 2021 23:52) (119/74 - 178/89)  RR: 17 (15 Oct 2021 23:52) (16 - 20)  SpO2: 100% (15 Oct 2021 23:52) (97% - 100%)      Plan:   -CTH ordered   -Chest X-ray   -ECG ordered and reviewed: NSR no acute ST-T wave changess   -F/u labs: BMP, CBC, CE  -Echo ordered  -Transfer to Tele for monitoring

## 2021-10-16 NOTE — PATIENT PROFILE ADULT - VISION (WITH CORRECTIVE LENSES IF THE PATIENT USUALLY WEARS THEM):
progressives/Normal vision: sees adequately in most situations; can see medication labels, newsprint

## 2021-10-16 NOTE — PROGRESS NOTE ADULT - SUBJECTIVE AND OBJECTIVE BOX
Patient is a 71y old  Male who presents with a chief complaint of rectal bleeding (17 Oct 2021 00:40)      SUBJECTIVE / OVERNIGHT EVENTS:    Events noted. S/p Fall  CONSTITUTIONAL: No fever,  or fatigue  RESPIRATORY: No cough, wheezing,  No shortness of breath  CARDIOVASCULAR: No chest pain, palpitations, dizziness, or leg swelling  GASTROINTESTINAL: No abdominal or epigastric pain. No nausea, vomiting.  NEUROLOGICAL: No headaches,     MEDICATIONS  (STANDING):  aspirin enteric coated 81 milliGRAM(s) Oral daily  atorvastatin 40 milliGRAM(s) Oral at bedtime  finasteride 5 milliGRAM(s) Oral daily  levothyroxine 75 MICROGram(s) Oral daily  metoprolol succinate ER 50 milliGRAM(s) Oral daily  pantoprazole  Injectable 40 milliGRAM(s) IV Push every 12 hours  sodium chloride 0.9%. 1000 milliLiter(s) (65 mL/Hr) IV Continuous <Continuous>    MEDICATIONS  (PRN):  acetaminophen   Tablet .. 650 milliGRAM(s) Oral every 6 hours PRN Temp greater or equal to 38C (100.4F), Mild Pain (1 - 3)        CAPILLARY BLOOD GLUCOSE      POCT Blood Glucose.: 140 mg/dL (16 Oct 2021 03:43)    I&O's Summary      T(C): 36.7 (10-17-21 @ 01:25), Max: 37.1 (10-16-21 @ 09:37)  HR: 60 (10-17-21 @ 01:25) (60 - 75)  BP: 145/75 (10-17-21 @ 01:25) (122/62 - 147/83)  RR: 17 (10-17-21 @ 01:25) (16 - 18)  SpO2: 100% (10-17-21 @ 01:25) (99% - 100%)    PHYSICAL EXAM:  GENERAL: NAD  NECK: Supple, No JVD  CHEST/LUNG: Clear to auscultation bilaterally; No wheezing.  HEART: Regular rate and rhythm; No murmurs, rubs, or gallops  ABDOMEN: Soft, Nontender, Nondistended; Bowel sounds present  EXTREMITIES:   No edema  NEUROLOGY: AAO X 3      LABS:                        11.8   9.37  )-----------( 190      ( 16 Oct 2021 17:59 )             34.7     10-16    142  |  99  |  14  ----------------------------<  163<H>  3.8   |  23  |  0.90    Ca    8.9      16 Oct 2021 06:28  Phos  2.6     10-16  Mg     2.00     10-16        CARDIAC MARKERS ( 16 Oct 2021 04:16 )  x     / x     / 215 U/L / x     / 5.4 ng/mL        CAPILLARY BLOOD GLUCOSE      POCT Blood Glucose.: 140 mg/dL (16 Oct 2021 03:43)        RADIOLOGY & ADDITIONAL TESTS:    Imaging Personally Reviewed:    Consultant(s) Notes Reviewed:      Care Discussed with Consultants/Other Providers:    Wilton Mcintyre MD, CMD, FACP    [id number] [id number]  Office Tel: [phone]  Cell: [phone]

## 2021-10-16 NOTE — CHART NOTE - NSCHARTNOTEFT_GEN_A_CORE
Vital Signs Last 24 Hrs  T(C): 37 (16 Oct 2021 05:20), Max: 37 (15 Oct 2021 17:55)  T(F): 98.6 (16 Oct 2021 05:20), Max: 98.6 (15 Oct 2021 17:55)  HR: 67 (16 Oct 2021 05:20) (62 - 76)  BP: 137/67 (16 Oct 2021 05:20) (119/74 - 170/74)  BP(mean): --  RR: 17 (16 Oct 2021 05:20) (16 - 20)  SpO2: 100% (16 Oct 2021 05:20) (97% - 100%)                          12.0   11.91 )-----------( 188      ( 16 Oct 2021 06:28 )             35.5   10-16    142  |  99  |  14  ----------------------------<  163<H>  3.8   |  23  |  0.90    Ca    8.9      16 Oct 2021 06:28  Phos  2.6     10-16  Mg     2.00     10-16    , CKMB 5.4, CPK 2.5, Trop 15   TPro  7.6  /  Alb  4.5  /  TBili  0.8  /  DBili  x   /  AST  22  /  ALT  25  /  AlkPhos  47  10-14    Above results and case discussed with Dr. Roche, made aware that patient is s/p fall earlier this morning. CTH ordered.   Spoke with surgery team, as patient co sternal pain, ordered for CT Chest r/o fracture post fall.   Patient for tele monitoring d/t cardiac hx and syncopal episode, Cards Dr. Preciado consulted by attending, will follow up recs. Pending Echo.  Advanced diet as per GI recs.   As per attending, Dr. Mcintyre to see patient today Vital Signs Last 24 Hrs  T(C): 37 (16 Oct 2021 05:20), Max: 37 (15 Oct 2021 17:55)  T(F): 98.6 (16 Oct 2021 05:20), Max: 98.6 (15 Oct 2021 17:55)  HR: 67 (16 Oct 2021 05:20) (62 - 76)  BP: 137/67 (16 Oct 2021 05:20) (119/74 - 170/74)  BP(mean): --  RR: 17 (16 Oct 2021 05:20) (16 - 20)  SpO2: 100% (16 Oct 2021 05:20) (97% - 100%)                          12.0   11.91 )-----------( 188      ( 16 Oct 2021 06:28 )             35.5   10-16    142  |  99  |  14  ----------------------------<  163<H>  3.8   |  23  |  0.90    Ca    8.9      16 Oct 2021 06:28  Phos  2.6     10-16  Mg     2.00     10-16    , CKMB 5.4, CPK 2.5, Trop 15   TPro  7.6  /  Alb  4.5  /  TBili  0.8  /  DBili  x   /  AST  22  /  ALT  25  /  AlkPhos  47  10-14    Above results and case discussed with Dr. Roche, made aware that patient is s/p fall earlier this morning. CTH ordered.   Spoke with surgery team, as patient co sternal pain, ordered for CT Chest r/o fracture post fall.   Patient for tele monitoring d/t cardiac hx and syncopal episode, Cards Dr. Preciado consulted by attending, will follow up recs. Pending Echo.  Advanced diet as per GI recs.   As per attending, Dr. Mcintyre to see patient today    ADDENDUM: CTH neg, CT Chest noted with Acute nondisplaced fracture of the left sixth rib, No pneumothorax, No sternal fracture identified.   Discussed CT findings with Dr. Roche and Dr. Mcintyre- no acute intervention at this time, continue supportive care, cold compress as needed, patient informed.   Patient with left eyebrow laceration from fall, Surgery assessed and placed sutures, patient and patient's family requesting tetanus vaccine ordered.   Continue aspirin as Cards, Hold Plavix. Discussed plan of care in length with patient, RN, patient's spouse at bedside, all questions answered.

## 2021-10-16 NOTE — PROGRESS NOTE ADULT - ASSESSMENT
___________________________________________________________________________________  ===============>>  A S S E S S M E N T   A N D   P L A N <<===============  ------------------------------------------------------------------------------------------    · Assessment	  71M with PMHx CAD s/p stents (last 11/2020 to RCA on ASA/plavix), HTN, HLD, hypothyroidism, diverticulosis, BPH presenting with rectal bleeding x1 day. FOBT positive, BRBPR in ED    Problem/Plan -1    S/p Fall: Rib Fx    Ct head: no acute CVA/Bleed  PT    Problem/Plan - 2  ·  Problem: Rectal bleeding.        BRBPR , multiple episodes, likely due to diverticular bleeding   CT scan noted  appreciated GI, Sx, IR consults overnight and coordination of night team  holding ASA and plavix until cleared by GI  plan for simoidoscopy  -PPI   -monitor CBC closely   blood transfusion if needed.  Problem/Plan - 3:  ·  Problem: CAD X 5 stents   ·  Plan: On ASA, plavix - hold for now  Cardiology on board   Last stent 11/2020 to RCA and cardiology rec'd one year of DAPT. However with continuing GIB risks of DAPT are high at this time.  statin, BB.

## 2021-10-16 NOTE — PROGRESS NOTE ADULT - SUBJECTIVE AND OBJECTIVE BOX
Interval Events:       Hospital Medications:  acetaminophen   Tablet .. 650 milliGRAM(s) Oral every 6 hours PRN  aspirin enteric coated 81 milliGRAM(s) Oral daily  atorvastatin 40 milliGRAM(s) Oral at bedtime  clopidogrel Tablet 75 milliGRAM(s) Oral daily  finasteride 5 milliGRAM(s) Oral daily  levothyroxine 75 MICROGram(s) Oral daily  metoprolol succinate ER 50 milliGRAM(s) Oral daily  pantoprazole  Injectable 40 milliGRAM(s) IV Push every 12 hours  sodium chloride 0.9%. 1000 milliLiter(s) IV Continuous <Continuous>        PHYSICAL EXAM:   Vital Signs:  Vital Signs Last 24 Hrs  T(C): 37 (16 Oct 2021 05:20), Max: 37 (15 Oct 2021 17:55)  T(F): 98.6 (16 Oct 2021 05:20), Max: 98.6 (15 Oct 2021 17:55)  HR: 67 (16 Oct 2021 05:20) (62 - 78)  BP: 137/67 (16 Oct 2021 05:20) (119/74 - 178/89)  BP(mean): --  RR: 17 (16 Oct 2021 05:20) (16 - 20)  SpO2: 100% (16 Oct 2021 05:20) (97% - 100%)  Daily Height in cm: 170.18 (15 Oct 2021 19:52)    Daily     GENERAL:  NAD, Appears stated age  HEENT:  NC/AT,  conjunctivae clear and pink, sclera -anicteric  CHEST:  Normal Effort, Breath sounds clear  HEART:  RRR, S1 + S2, no murmurs  ABDOMEN:  Soft, non-tender, non-distended, normoactive bowel sounds,  no masses  EXTREMITIES:  no cyanosis or edema  SKIN:  Warm & Dry. No rash or erythema  NEURO:  Alert, oriented, no focal deficit    LABS:                        12.8   8.37  )-----------( 187      ( 16 Oct 2021 04:16 )             36.8     Mean Cell Volume: 80.5 fL (10-16-21 @ 04:16)    10-16    138  |  104  |  14  ----------------------------<  157<H>  3.7   |  22  |  0.94    Ca    9.1      16 Oct 2021 04:16  Phos  2.9     10-16  Mg     2.00     10-16    TPro  7.6  /  Alb  4.5  /  TBili  0.8  /  DBili  x   /  AST  22  /  ALT  25  /  AlkPhos  47  10-14    LIVER FUNCTIONS - ( 14 Oct 2021 19:41 )  Alb: 4.5 g/dL / Pro: 7.6 g/dL / ALK PHOS: 47 U/L / ALT: 25 U/L / AST: 22 U/L / GGT: x           PT/INR - ( 14 Oct 2021 19:41 )   PT: 12.4 sec;   INR: 1.08 ratio         PTT - ( 14 Oct 2021 19:41 )  PTT:32.3 sec                            12.8   8.37  )-----------( 187      ( 16 Oct 2021 04:16 )             36.8                         12.2   10.28 )-----------( 191      ( 15 Oct 2021 20:37 )             36.3                         14.1   10.28 )-----------( 205      ( 15 Oct 2021 10:56 )             41.6                         13.6   7.48  )-----------( 190      ( 15 Oct 2021 02:39 )             40.5                         14.9   10.67 )-----------( 212      ( 14 Oct 2021 19:41 )             44.2       Imaging:   < from: Flexible Sigmoidoscopy (10.15.21 @ 17:42) >                       Findings:       Multiple small and large mouth diverticulum were seen in the entire        visualized colon up to the transverse colon. Extensive lavage was        performed. A single small-mouthed diverticulum with active bleeding        coming from the diverticular opening was found in the descending colon.        To stop active bleeding, five hemostatic clips were successfully placed.        There was no bleeding at the end of the procedure.                                                    Impression:          - Active bleeding coming from the diverticular opening                        in the descending colon. 5 clips were placed.                       - Multiple diverticulum seen in the sigmoid, descending                        and transverse colon.                       - No specimens collected.  Recommendation:      - Return patient to hospital [facility] for ongoing care.                       - Clear liquid diet today.           - Monitor CBC every 8-12 hours, transfuse to Hb > 8 as                        needed.                       - Continue anti-platelet therapy as per Cardiology/                        primary team.                       - If patient experiences further bleeding, consider IR                        evaluation for embolization.    < end of copied text >           Interval Events:   Passing blood clot  Hb stable  Had syncope this morning and hit his head.    Hospital Medications:  acetaminophen   Tablet .. 650 milliGRAM(s) Oral every 6 hours PRN  aspirin enteric coated 81 milliGRAM(s) Oral daily  atorvastatin 40 milliGRAM(s) Oral at bedtime  clopidogrel Tablet 75 milliGRAM(s) Oral daily  finasteride 5 milliGRAM(s) Oral daily  levothyroxine 75 MICROGram(s) Oral daily  metoprolol succinate ER 50 milliGRAM(s) Oral daily  pantoprazole  Injectable 40 milliGRAM(s) IV Push every 12 hours  sodium chloride 0.9%. 1000 milliLiter(s) IV Continuous <Continuous>        PHYSICAL EXAM:   Vital Signs:  Vital Signs Last 24 Hrs  T(C): 37 (16 Oct 2021 05:20), Max: 37 (15 Oct 2021 17:55)  T(F): 98.6 (16 Oct 2021 05:20), Max: 98.6 (15 Oct 2021 17:55)  HR: 67 (16 Oct 2021 05:20) (62 - 78)  BP: 137/67 (16 Oct 2021 05:20) (119/74 - 178/89)  BP(mean): --  RR: 17 (16 Oct 2021 05:20) (16 - 20)  SpO2: 100% (16 Oct 2021 05:20) (97% - 100%)  Daily Height in cm: 170.18 (15 Oct 2021 19:52)    Daily     GENERAL:  NAD, Appears stated age  HEENT:  NC/AT,  conjunctivae clear and pink, sclera -anicteric  CHEST:  Normal Effort, Breath sounds clear  HEART:  RRR, S1 + S2, no murmurs  ABDOMEN:  Soft, non-tender, non-distended, normoactive bowel sounds,  no masses  EXTREMITIES:  no cyanosis or edema  SKIN:  Warm & Dry. No rash or erythema  NEURO:  Alert, oriented, no focal deficit    LABS:                        12.8   8.37  )-----------( 187      ( 16 Oct 2021 04:16 )             36.8     Mean Cell Volume: 80.5 fL (10-16-21 @ 04:16)    10-16    138  |  104  |  14  ----------------------------<  157<H>  3.7   |  22  |  0.94    Ca    9.1      16 Oct 2021 04:16  Phos  2.9     10-16  Mg     2.00     10-16    TPro  7.6  /  Alb  4.5  /  TBili  0.8  /  DBili  x   /  AST  22  /  ALT  25  /  AlkPhos  47  10-14    LIVER FUNCTIONS - ( 14 Oct 2021 19:41 )  Alb: 4.5 g/dL / Pro: 7.6 g/dL / ALK PHOS: 47 U/L / ALT: 25 U/L / AST: 22 U/L / GGT: x           PT/INR - ( 14 Oct 2021 19:41 )   PT: 12.4 sec;   INR: 1.08 ratio         PTT - ( 14 Oct 2021 19:41 )  PTT:32.3 sec                            12.8   8.37  )-----------( 187      ( 16 Oct 2021 04:16 )             36.8                         12.2   10.28 )-----------( 191      ( 15 Oct 2021 20:37 )             36.3                         14.1   10.28 )-----------( 205      ( 15 Oct 2021 10:56 )             41.6                         13.6   7.48  )-----------( 190      ( 15 Oct 2021 02:39 )             40.5                         14.9   10.67 )-----------( 212      ( 14 Oct 2021 19:41 )             44.2       Imaging:   < from: Flexible Sigmoidoscopy (10.15.21 @ 17:42) >                       Findings:       Multiple small and large mouth diverticulum were seen in the entire        visualized colon up to the transverse colon. Extensive lavage was        performed. A single small-mouthed diverticulum with active bleeding        coming from the diverticular opening was found in the descending colon.        To stop active bleeding, five hemostatic clips were successfully placed.        There was no bleeding at the end of the procedure.                                                    Impression:          - Active bleeding coming from the diverticular opening                        in the descending colon. 5 clips were placed.                       - Multiple diverticulum seen in the sigmoid, descending                        and transverse colon.                       - No specimens collected.  Recommendation:      - Return patient to hospital [facility] for ongoing care.                       - Clear liquid diet today.           - Monitor CBC every 8-12 hours, transfuse to Hb > 8 as                        needed.                       - Continue anti-platelet therapy as per Cardiology/                        primary team.                       - If patient experiences further bleeding, consider IR                        evaluation for embolization.    < end of copied text >           Interval Events:   Passing blood clots  Hb stable  Had syncope this morning and hit his head.    Hospital Medications:  acetaminophen   Tablet .. 650 milliGRAM(s) Oral every 6 hours PRN  aspirin enteric coated 81 milliGRAM(s) Oral daily  atorvastatin 40 milliGRAM(s) Oral at bedtime  clopidogrel Tablet 75 milliGRAM(s) Oral daily  finasteride 5 milliGRAM(s) Oral daily  levothyroxine 75 MICROGram(s) Oral daily  metoprolol succinate ER 50 milliGRAM(s) Oral daily  pantoprazole  Injectable 40 milliGRAM(s) IV Push every 12 hours  sodium chloride 0.9%. 1000 milliLiter(s) IV Continuous <Continuous>        PHYSICAL EXAM:   Vital Signs:  Vital Signs Last 24 Hrs  T(C): 37 (16 Oct 2021 05:20), Max: 37 (15 Oct 2021 17:55)  T(F): 98.6 (16 Oct 2021 05:20), Max: 98.6 (15 Oct 2021 17:55)  HR: 67 (16 Oct 2021 05:20) (62 - 78)  BP: 137/67 (16 Oct 2021 05:20) (119/74 - 178/89)  BP(mean): --  RR: 17 (16 Oct 2021 05:20) (16 - 20)  SpO2: 100% (16 Oct 2021 05:20) (97% - 100%)  Daily Height in cm: 170.18 (15 Oct 2021 19:52)    Daily     GENERAL:  NAD, Appears stated age  HEENT:  NC/AT,  conjunctivae clear and pink, sclera -anicteric  CHEST:  Normal Effort, Breath sounds clear  HEART:  RRR, S1 + S2, no murmurs  ABDOMEN:  Soft, non-tender, non-distended, normoactive bowel sounds,  no masses  EXTREMITIES:  no cyanosis or edema  SKIN:  Warm & Dry. No rash or erythema  NEURO:  Alert, oriented, no focal deficit    LABS:                        12.8   8.37  )-----------( 187      ( 16 Oct 2021 04:16 )             36.8     Mean Cell Volume: 80.5 fL (10-16-21 @ 04:16)    10-16    138  |  104  |  14  ----------------------------<  157<H>  3.7   |  22  |  0.94    Ca    9.1      16 Oct 2021 04:16  Phos  2.9     10-16  Mg     2.00     10-16    TPro  7.6  /  Alb  4.5  /  TBili  0.8  /  DBili  x   /  AST  22  /  ALT  25  /  AlkPhos  47  10-14    LIVER FUNCTIONS - ( 14 Oct 2021 19:41 )  Alb: 4.5 g/dL / Pro: 7.6 g/dL / ALK PHOS: 47 U/L / ALT: 25 U/L / AST: 22 U/L / GGT: x           PT/INR - ( 14 Oct 2021 19:41 )   PT: 12.4 sec;   INR: 1.08 ratio         PTT - ( 14 Oct 2021 19:41 )  PTT:32.3 sec                            12.8   8.37  )-----------( 187      ( 16 Oct 2021 04:16 )             36.8                         12.2   10.28 )-----------( 191      ( 15 Oct 2021 20:37 )             36.3                         14.1   10.28 )-----------( 205      ( 15 Oct 2021 10:56 )             41.6                         13.6   7.48  )-----------( 190      ( 15 Oct 2021 02:39 )             40.5                         14.9   10.67 )-----------( 212      ( 14 Oct 2021 19:41 )             44.2       Imaging:   < from: Flexible Sigmoidoscopy (10.15.21 @ 17:42) >                       Findings:       Multiple small and large mouth diverticulum were seen in the entire        visualized colon up to the transverse colon. Extensive lavage was        performed. A single small-mouthed diverticulum with active bleeding        coming from the diverticular opening was found in the descending colon.        To stop active bleeding, five hemostatic clips were successfully placed.        There was no bleeding at the end of the procedure.                                                    Impression:          - Active bleeding coming from the diverticular opening                        in the descending colon. 5 clips were placed.                       - Multiple diverticulum seen in the sigmoid, descending                        and transverse colon.                       - No specimens collected.  Recommendation:      - Return patient to hospital [facility] for ongoing care.                       - Clear liquid diet today.           - Monitor CBC every 8-12 hours, transfuse to Hb > 8 as                        needed.                       - Continue anti-platelet therapy as per Cardiology/                        primary team.                       - If patient experiences further bleeding, consider IR                        evaluation for embolization.    < end of copied text >

## 2021-10-16 NOTE — PROGRESS NOTE ADULT - ASSESSMENT
71M w/ hx of CAD s/p multiple stents, last 11/2020, on ASA and Plavix, presenting w/ several episodes of painless hematochezia. VSS, Hb remains 13-14. CTA positive for bleeding in descending colon.    Impression:  #Diverticular bleed -  s/p 5 clips to bleeding diverticulum    #CAD s/p PCI on DAPT      Recommendations:  -Trend CBC q12H  -Continue anti-platelet therapy as per Cardiology  -Can advance diet as tolerated.  -IF rebleeding occurs, consider IR for further evaluation.    Primitivo Cee MD  Gastroenterology/Hepatology Fellow  Contact on-call GI fellow via answering service ([phone]) from 5pm-8am AND on weekends/holidays

## 2021-10-16 NOTE — PROGRESS NOTE ADULT - ATTENDING COMMENTS
71M w/ hx of CAD s/p multiple stents, last 11/2020, on ASA and Plavix, presenting w/ several episodes of painless hematochezia d/t diverticular bleed s/p colonoscopy with active bleeding found and hemostasis with clip placement.  Passing some blood with stools is expected, Hgb is stable.  Trend hemoglobin and monitor BMs.  Patient did have RRT today for vagal syncope with acute 6th rib fracture and facial lac. Care per surgery, primary team.    Thank you for this interesting consult.  Please call the GI service with any questions or concerns.

## 2021-10-16 NOTE — PROGRESS NOTE ADULT - ASSESSMENT
71 year with CAD on dual antiplatelets presenting with CTA positive diverticular bleed. Underwent flex sig yesterday with clipping. Now s/p fall this AM   -GI bleed appears resolving. Trend CBC   -Will suture facial laceration. Sutures are asorable, no need to remove   -Please call with any further questions.     B Team Surgery   [id number] 71 year with CAD on dual antiplatelets presenting with CTA positive diverticular bleed. Underwent flex sig yesterday with clipping. Now s/p fall this AM   -GI bleed appears resolving. Trend CBC   -Will suture facial laceration. Sutures are absorable, no need to remove   -CT head negative; follow up CT chest final read  -Please call with any further questions.     B Team Surgery   [id number] 71 year with CAD on dual antiplatelets presenting with CTA positive diverticular bleed. Underwent flex sig yesterday with clipping. Now s/p fall this AM   -GI bleed appears resolving. Trend CBC   -Will suture facial laceration. Sutures are absorable, no need to remove   -CT head negative; CT chest with acute sixth rib fracture.  No intervention for rib fracture. Recommend multimodal pain control with scheduled Tylenol, Motrin if able, and oxycodone PRN.  Please ensure patient does incentive spirometry   -Please call with any further questions.     B Team Surgery   [id number]

## 2021-10-17 LAB
ANION GAP SERPL CALC-SCNC: 11 MMOL/L — SIGNIFICANT CHANGE UP (ref 7–14)
BUN SERPL-MCNC: 14 MG/DL — SIGNIFICANT CHANGE UP (ref 7–23)
CALCIUM SERPL-MCNC: 9.3 MG/DL — SIGNIFICANT CHANGE UP (ref 8.4–10.5)
CHLORIDE SERPL-SCNC: 105 MMOL/L — SIGNIFICANT CHANGE UP (ref 98–107)
CO2 SERPL-SCNC: 23 MMOL/L — SIGNIFICANT CHANGE UP (ref 22–31)
CREAT SERPL-MCNC: 1.05 MG/DL — SIGNIFICANT CHANGE UP (ref 0.5–1.3)
GLUCOSE SERPL-MCNC: 162 MG/DL — HIGH (ref 70–99)
HCT VFR BLD CALC: 33.2 % — LOW (ref 39–50)
HCT VFR BLD CALC: 33.8 % — LOW (ref 39–50)
HCT VFR BLD CALC: 34.8 % — LOW (ref 39–50)
HGB BLD-MCNC: 11.3 G/DL — LOW (ref 13–17)
HGB BLD-MCNC: 11.5 G/DL — LOW (ref 13–17)
HGB BLD-MCNC: 12.1 G/DL — LOW (ref 13–17)
MAGNESIUM SERPL-MCNC: 2.2 MG/DL — SIGNIFICANT CHANGE UP (ref 1.6–2.6)
MCHC RBC-ENTMCNC: 26.7 PG — LOW (ref 27–34)
MCHC RBC-ENTMCNC: 27.9 PG — SIGNIFICANT CHANGE UP (ref 27–34)
MCHC RBC-ENTMCNC: 28.5 PG — SIGNIFICANT CHANGE UP (ref 27–34)
MCHC RBC-ENTMCNC: 33 GM/DL — SIGNIFICANT CHANGE UP (ref 32–36)
MCHC RBC-ENTMCNC: 34 GM/DL — SIGNIFICANT CHANGE UP (ref 32–36)
MCHC RBC-ENTMCNC: 35.8 GM/DL — SIGNIFICANT CHANGE UP (ref 32–36)
MCV RBC AUTO: 79.7 FL — LOW (ref 80–100)
MCV RBC AUTO: 80.9 FL — SIGNIFICANT CHANGE UP (ref 80–100)
MCV RBC AUTO: 82 FL — SIGNIFICANT CHANGE UP (ref 80–100)
NRBC # BLD: 0 /100 WBCS — SIGNIFICANT CHANGE UP
NRBC # FLD: 0 K/UL — SIGNIFICANT CHANGE UP
PHOSPHATE SERPL-MCNC: 3 MG/DL — SIGNIFICANT CHANGE UP (ref 2.5–4.5)
PLATELET # BLD AUTO: 178 K/UL — SIGNIFICANT CHANGE UP (ref 150–400)
PLATELET # BLD AUTO: 200 K/UL — SIGNIFICANT CHANGE UP (ref 150–400)
PLATELET # BLD AUTO: 219 K/UL — SIGNIFICANT CHANGE UP (ref 150–400)
POTASSIUM SERPL-MCNC: 3.9 MMOL/L — SIGNIFICANT CHANGE UP (ref 3.5–5.3)
POTASSIUM SERPL-SCNC: 3.9 MMOL/L — SIGNIFICANT CHANGE UP (ref 3.5–5.3)
RBC # BLD: 4.05 M/UL — LOW (ref 4.2–5.8)
RBC # BLD: 4.24 M/UL — SIGNIFICANT CHANGE UP (ref 4.2–5.8)
RBC # BLD: 4.3 M/UL — SIGNIFICANT CHANGE UP (ref 4.2–5.8)
RBC # FLD: 13.1 % — SIGNIFICANT CHANGE UP (ref 10.3–14.5)
RBC # FLD: 13.2 % — SIGNIFICANT CHANGE UP (ref 10.3–14.5)
RBC # FLD: 13.2 % — SIGNIFICANT CHANGE UP (ref 10.3–14.5)
SODIUM SERPL-SCNC: 139 MMOL/L — SIGNIFICANT CHANGE UP (ref 135–145)
WBC # BLD: 10.15 K/UL — SIGNIFICANT CHANGE UP (ref 3.8–10.5)
WBC # BLD: 8.06 K/UL — SIGNIFICANT CHANGE UP (ref 3.8–10.5)
WBC # BLD: 8.57 K/UL — SIGNIFICANT CHANGE UP (ref 3.8–10.5)
WBC # FLD AUTO: 10.15 K/UL — SIGNIFICANT CHANGE UP (ref 3.8–10.5)
WBC # FLD AUTO: 8.06 K/UL — SIGNIFICANT CHANGE UP (ref 3.8–10.5)
WBC # FLD AUTO: 8.57 K/UL — SIGNIFICANT CHANGE UP (ref 3.8–10.5)

## 2021-10-17 PROCEDURE — 99222 1ST HOSP IP/OBS MODERATE 55: CPT

## 2021-10-17 RX ORDER — LANOLIN ALCOHOL/MO/W.PET/CERES
3 CREAM (GRAM) TOPICAL AT BEDTIME
Refills: 0 | Status: DISCONTINUED | OUTPATIENT
Start: 2021-10-17 | End: 2021-10-19

## 2021-10-17 RX ADMIN — ATORVASTATIN CALCIUM 40 MILLIGRAM(S): 80 TABLET, FILM COATED ORAL at 22:30

## 2021-10-17 RX ADMIN — FINASTERIDE 5 MILLIGRAM(S): 5 TABLET, FILM COATED ORAL at 10:52

## 2021-10-17 RX ADMIN — Medication 81 MILLIGRAM(S): at 10:52

## 2021-10-17 RX ADMIN — PANTOPRAZOLE SODIUM 40 MILLIGRAM(S): 20 TABLET, DELAYED RELEASE ORAL at 05:41

## 2021-10-17 RX ADMIN — Medication 50 MILLIGRAM(S): at 10:52

## 2021-10-17 RX ADMIN — PANTOPRAZOLE SODIUM 40 MILLIGRAM(S): 20 TABLET, DELAYED RELEASE ORAL at 17:32

## 2021-10-17 RX ADMIN — Medication 3 MILLIGRAM(S): at 22:30

## 2021-10-17 RX ADMIN — Medication 75 MICROGRAM(S): at 05:42

## 2021-10-17 NOTE — CONSULT NOTE ADULT - ATTENDING COMMENTS
70yo M w/ CAD s/p stents (last 11/2020 to RCA on ASA/plavix), HTN, HLD, hypothyroidism, diverticulosis, BPH presenting with rectal bleeding s/p sigmoidoscopy with e/o diverticular bleed s/p clipping.  Hospital course c/b syncopal episode early in the morning yesterday after feeling nauseous and dry heaving.  Patient LOC and fractured a rib also with contusion to head, CTH negative for acute pathology. Of note w/ previous episode of syncope and N/V preceding in 2019    # Syncope  Suspect vasovagal as episode occurred after violently dry heaving and nausea, less likely conduction disease  CT head showed no intracranial hemorrhage, acute lobar infarction or cerebral edema.  EKG with SR@65bpm no ischemic changes, continue tele monitoring  orthostatic vitals  TTE in am  carotid dopplers    Concerned that patient has had 3 previous episodes of nausea, two episodes resulting in syncope with one of the episodes of syncope resulting in rib fracture and contusion to the head.  He takes antiplatelets for CAD, placing at increased risk for intracranial bleed.  My concern is that we have not ascertained the root cause of the nausea, retching and vomiting leading to loss of conscious and if we continue to ignore the potential stimulus, he will continue to have nausea and subsequent syncope and he is at increased risk for intracranial bleed if he strikes his head hard enough. There is no evidence of conduction disease on ECG and patient has no previous arrhythmias.  Admittedly he could have pauses due to high vagal tone, but what is causing the nausea leading to the high vagal tone?  I think we have to rule out etiologies such gastroesophagitis, gastric or duodenal ulcers or gastric masses. I would strong recommend that the gastroenterologist perform upper endoscopy if possible.  CT scan has ruled out other GI pathologies.
71 Male CAD/stent on DAPT p/w hematochezia.  CTA + in descending colon, likely diverticular bleed.  Plan for flex sig if persistent bleeding.

## 2021-10-17 NOTE — PROGRESS NOTE ADULT - SUBJECTIVE AND OBJECTIVE BOX
Spoke with dr Roche regarding patient's status. Recommendations made for GI re eval  for persistent symptoms. Pages put forward for GI to re eval for possible need of EGD. Await return call. Will make NPO after midnight in case EGD is planned for am.

## 2021-10-17 NOTE — CHART NOTE - NSCHARTNOTEFT_GEN_A_CORE
GI Brief Note    Patient Hb stable, normal to continue to pass clots.  If patient rebleeds again, would rec reaching out to IR.    No further work up from our perspective, please reach out to us for any question or concern.    Primitivo Cee MD  Gastroenterology/Hepatology Fellow  Contact on-call GI fellow via answering service ([phone]) from 5pm-8am AND on weekends/holidays

## 2021-10-17 NOTE — PROGRESS NOTE ADULT - SUBJECTIVE AND OBJECTIVE BOX
Cardiovascular Disease Progress Note    Overnight events: No acute events overnight.  no cp/sob/palps/dizziness  Otherwise review of systems negative    Objective Findings:  T(C): 36.7 (10-17-21 @ 05:00), Max: 36.7 (10-17-21 @ 01:25)  HR: 70 (10-17-21 @ 09:04) (60 - 75)  BP: 147/74 (10-17-21 @ 09:04) (129/63 - 147/83)  RR: 18 (10-17-21 @ 09:04) (16 - 18)  SpO2: 100% (10-17-21 @ 09:04) (99% - 100%)  Wt(kg): --  Daily     Daily       Physical Exam:  Gen: NAD  HEENT: EOMI  CV: RRR, normal S1 + S2, no m/r/g  Lungs: CTAB  Abd: soft, non-tender  Ext: No edema    Telemetry: nsr rate related bundle branch    Laboratory Data:                        11.5   8.57  )-----------( 200      ( 17 Oct 2021 11:29 )             34.8     10-17    139  |  105  |  14  ----------------------------<  162<H>  3.9   |  23  |  1.05    Ca    9.3      17 Oct 2021 03:21  Phos  3.0     10-17  Mg     2.20     10-17        CARDIAC MARKERS ( 16 Oct 2021 04:16 )  x     / x     / 215 U/L / x     / 5.4 ng/mL          Inpatient Medications:  MEDICATIONS  (STANDING):  aspirin enteric coated 81 milliGRAM(s) Oral daily  atorvastatin 40 milliGRAM(s) Oral at bedtime  finasteride 5 milliGRAM(s) Oral daily  levothyroxine 75 MICROGram(s) Oral daily  metoprolol succinate ER 50 milliGRAM(s) Oral daily  pantoprazole  Injectable 40 milliGRAM(s) IV Push every 12 hours  sodium chloride 0.9%. 1000 milliLiter(s) (65 mL/Hr) IV Continuous <Continuous>      Assessment:  -syncope  -diverticular bleed  -hx of NSTEMI/CAD s/p prior stents (most recently to dRCA in 11/2020)  -HTN    Recs:  cv stable  syncope, possibly vagal possibly in setting of hypovolemia and recent GI bleed and acute nausea episode. 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 last stent ~ 11 months ago to dRCA  f/u gi recs. s/p flex sig. may need EGD for upper gi sx        Over 35 minutes spent on total encounter; more than 50% of the visit was spent counseling and/or coordinating care by the attending physician.      Donnie Preciado MD   Cardiovascular Disease  [phone] Cardiovascular Disease Progress Note    Overnight events: No acute events overnight.  no cp/sob/palps/dizziness  Otherwise review of systems negative    Objective Findings:  T(C): 36.7 (10-17-21 @ 05:00), Max: 36.7 (10-17-21 @ 01:25)  HR: 70 (10-17-21 @ 09:04) (60 - 75)  BP: 147/74 (10-17-21 @ 09:04) (129/63 - 147/83)  RR: 18 (10-17-21 @ 09:04) (16 - 18)  SpO2: 100% (10-17-21 @ 09:04) (99% - 100%)  Wt(kg): --  Daily     Daily       Physical Exam:  Gen: NAD  HEENT: EOMI  CV: RRR, normal S1 + S2, no m/r/g  Lungs: CTAB  Abd: soft, non-tender  Ext: No edema    Telemetry: nsr rate related bundle branch    Laboratory Data:                        11.5   8.57  )-----------( 200      ( 17 Oct 2021 11:29 )             34.8     10-17    139  |  105  |  14  ----------------------------<  162<H>  3.9   |  23  |  1.05    Ca    9.3      17 Oct 2021 03:21  Phos  3.0     10-17  Mg     2.20     10-17        CARDIAC MARKERS ( 16 Oct 2021 04:16 )  x     / x     / 215 U/L / x     / 5.4 ng/mL          Inpatient Medications:  MEDICATIONS  (STANDING):  aspirin enteric coated 81 milliGRAM(s) Oral daily  atorvastatin 40 milliGRAM(s) Oral at bedtime  finasteride 5 milliGRAM(s) Oral daily  levothyroxine 75 MICROGram(s) Oral daily  metoprolol succinate ER 50 milliGRAM(s) Oral daily  pantoprazole  Injectable 40 milliGRAM(s) IV Push every 12 hours  sodium chloride 0.9%. 1000 milliLiter(s) (65 mL/Hr) IV Continuous <Continuous>      Assessment:  -syncope  -diverticular bleed  -hx of NSTEMI/CAD s/p prior stents (most recently to dRCA in 11/2020)  -HTN    Recs:  cv stable  syncope, possibly vagal possibly in setting of hypovolemia and recent GI bleed and acute nausea episode. 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 last stent ~ 11 months ago to dRCA  f/u gi recs. s/p flex sig. may need EGD for upper gi sx  Advanced care planning forms were discussed. Code status including forceful chest compressions, defibrillation and intubation were discussed. The risks benefits and alternatives to pertinent cardiac procedures and interventions were discussed in detail and all questions were answered. Duration: 15 minutes.      Over 40 minutes spent on total encounter; more than 50% of the visit was spent counseling and/or coordinating care by the attending physician.      Donnie Preciado MD   Cardiovascular Disease  [phone]

## 2021-10-17 NOTE — PROGRESS NOTE ADULT - ASSESSMENT
_________________________________________________________________________________________  ========>>  M E D I C A L   A T T E N D I N G    F O L L O W  U P  N O T E  <<=========  -----------------------------------------------------------------------------------------------------    - Patient seen and examined by me earlier today.   - In summary,  GONZÁLEZ WHITLOCK is a 71y year old man admitted with rectal bleed      noted events yesterday and discussed in detail with medical team, covering MD, specialists and discussed separately again wit pt as bellow   - Patient today overall doing ok, comfortable, no fresh GI bleed reported ( some dork BMs), no dizziness     no headache in regards to syncope and laceration     ==================>> REVIEW OF SYSTEM <<=================    GEN: no fever, no chills, no pain  RESP: no SOB, no cough, no sputum  CVS: no chest pain, no palpitations, no edema  GI: no abdominal pain, no nausea at this time , as above   : no dysuria, no frequency, no hematuria  Neuro: no headache, no dizziness  Derm : no itching, no rash    ==================>> PHYSICAL EXAM <<=================    GEN: A&O X 3 , NAD , comfortable, pleasant, calm in chair  HEENT: left facial laceration repaired, dressed)  PERRL, MMM, hearing intact  Neck: supple , no JVD appreciated  CVS: S1S2 , regular , No M/R/G appreciated  PULM: CTA B/L,  no W/R/R appreciated, IS at bedside   ABD.: soft. non tender, non distended,  bowel sounds present  Extrem: intact pulses , no edema   PSYCH : normal mood,  not anxious                             ( Note written / Date of service 10-17-21 )    ==================>> MEDICATIONS <<====================    aspirin enteric coated 81 milliGRAM(s) Oral daily  atorvastatin 40 milliGRAM(s) Oral at bedtime  finasteride 5 milliGRAM(s) Oral daily  levothyroxine 75 MICROGram(s) Oral daily  metoprolol succinate ER 50 milliGRAM(s) Oral daily  pantoprazole  Injectable 40 milliGRAM(s) IV Push every 12 hours  sodium chloride 0.9%. 1000 milliLiter(s) IV Continuous <Continuous>    MEDICATIONS  (PRN):  acetaminophen   Tablet .. 650 milliGRAM(s) Oral every 6 hours PRN Temp greater or equal to 38C (100.4F), Mild Pain (1 - 3)    ___________  Active diet:  Diet, NPO after Midnight:      NPO Start Date: 17-Oct-2021,   NPO Start Time: 23:59  Diet, Regular:   DASH/TLC Sodium & Cholesterol Restricted (DASH)  ___________________    ==================>> VITAL SIGNS <<==================    Height (cm): 170.2  Weight (kg): 88.3  BMI (kg/m2): 30.5  Vital Signs Last 24 HrsT(C): 36.7 (10-17-21 @ 05:00)  T(F): 98.1 (10-17-21 @ 05:00), Max: 98.1 (10-17-21 @ 05:00)  HR: 70 (10-17-21 @ 09:04) (60 - 75)  BP: 147/74 (10-17-21 @ 09:04)  RR: 18 (10-17-21 @ 09:04) (16 - 18)  SpO2: 100% (10-17-21 @ 09:04) (99% - 100%)       ==================>> LAB AND IMAGING <<==================                        11.5   8.57  )-----------( 200      ( 17 Oct 2021 11:29 )             34.8        10-17    139  |  105  |  14  ----------------------------<  162<H>  3.9   |  23  |  1.05    Ca    9.3      17 Oct 2021 03:21  Phos  3.0     10-17  Mg     2.20     10-17    WBC count:   8.57 <<== ,  10.15 <<== ,  9.37 <<== ,  11.91 <<== ,  8.37 <<== ,  10.28 <<==   Hemoglobin:   11.5 <<==,  12.1 <<==,  11.8 <<==,  12.0 <<==,  12.8 <<==,  12.2 <<==  platelets:  200 <==, 219 <==, 190 <==, 188 <==, 187 <==, 191 <==, 205 <==    Creatinine:  1.05  <<==, 0.90  <<==, 0.94  <<==, 0.99  <<==  Sodium:   139  <==, 142  <==, 138  <==, 143  <==    < from: Flexible Sigmoidoscopy (10.15.21 @ 17:42) >  Impression:          - Active bleeding coming from the diverticular opening                        in the descending colon. 5 clips were placed.                       - Multiple diverticulum seen in the sigmoid, descending                        and transverse colon.                       - No specimens collected.  Recommendation:      - Return patient to hospital [facility] for ongoing care.                       - Clear liquid diet today.           - Monitor CBC every 8-12 hours, transfuse to Hb > 8 as                        needed.                       - Continue anti-platelet therapy as per Cardiology/                        primary team.                       - If patient experiences further bleeding, consider IR                        evaluation for embolization.  < end of copied text >  ___________________________________________________________________________________  ===============>>  A S S E S S M E N T   A N D   P L A N <<===============  ------------------------------------------------------------------------------------------    · Assessment	  71M with PMHx CAD s/p stents (last 11/2020 to RCA on ASA/plavix), HTN, HLD, hypothyroidism, diverticulosis, BPH presenting with rectal bleeding x1 day. FOBT positive, BRBPR in ED    Problem/Plan - :  ·  Problem:  Syncope, fall with facial laceration and rib fracture       I had long discussions with pt, EP and cardio        Syncope thought to be vasovagal in nature          no significant arrythmias / abnormalities seen on tele as reviewed by EP      this is the second such episode of syncope for pt with prodromal GI symptoms of nausea, retching / vomiting ..         EP / cardio asking for EGD to be done            GI on board and assessing ( but not scheduled yet)  ( pt states if will be prolonged scheduing time, oneil rather do as OP)                RX written for Zofran ODT as has helped bbefore                continue PPI  continue tele monitoring for now  other workup considered as well ( cath/ PPM? Loop) but one recommended at this time      pt states has had recent negative stress, carotid duplx / echo in office as well      Problem/Plan - 1:  ·  Problem: Rectal bleeding.        BRBPR , multiple episodes, due to diverticular bleeding post clipping as above   appreciated all specialty follow up and mgmt   ASA already resumed >> to hold plavix indefinitely for now   -PPI   -monitor CBC closely     Problem/Plan - 2:  ·  Problem: CAD X 5 stents   ·  Plan: On ASA, plavix at home : as above   Cardiology on board   statin, BB.    Problem/Plan - 3:  ·  Problem: HTN , poorly controlled   was off metoprolol XL 50mg qd.>> resumed  montior    Problem/Plan - 4:  ·  Problem: BPH (benign prostatic hyperplasia).   ·  Plan: Resume finasteride.    Problem/Plan - 5:  ·  Problem: Hypothyroidism.   ·  Plan: Resume levothyroxine 75mcg qd.    Problem/Plan - 6:  ·  Problem: Need for prophylactic measure.   ·  Plan: Patient is ambulatory so low risk for VTE. Hold chemical DVT ppx  diet     --------------------------------------------  Case discussed with pt.. cardio, EP, NP, ...   Education given on findings and plan of care    Today I had a prolonged conversation with the patient, GONZÁLEZ WHITLOCK, re diagnosis, findings, and plan of care, prognosis, alternate treatments / options . Patient verbalized clear understanding, many many questions answered.     also discussed with pt re proper use / timimg of meds ... ( pt at home takes metoprolol, plavix, lovaza , vitamin D and C all at night and takes synthroid, PPI and aspirin in AM on empty stomach...         pt extensively educated on proper / timinmg of meds, plavix and lovaza being stopped per conversation with cardio, and Rx given for zofran ODT as above          also discussed strategies to thwart vasovagal syncope and all questions answered     Additional time spent approx. 45 min.    ___________________________  H. EDOUARD Roche.  Pager: [phone]

## 2021-10-17 NOTE — CONSULT NOTE ADULT - ASSESSMENT
72yo M w/ CAD s/p stents (last 11/2020 to RCA on ASA/plavix), HTN, HLD, hypothyroidism, diverticulosis, BPH presenting with rectal bleeding s/p simoidoscopy with e/o diverticular bleed s/p clipping.  Hospital course c/b syncopal episode early in the morning yesterday after feeling nauseous and dry heaving.  Patient LOC and fractured a rib also with contusion to head, CTH negative for acute pathology. Of note w/ previous episode of syncope and N/V preceding in 2019    # Syncope  Suspect vasovagal as episode occurred after violently dry heaving and nausea, less likely conduction disease  CT head showed no intracranial hemorrhage, acute lobar infarction or cerebral edema.  EKG with SR@65bpm no ischemic changes, continue tele monitoring  orthostatic vitals  TTE in am  carotid dopplers  Consider GI w/u for N/V if patient persists to have upper GI symptoms of N/V,     Thank you, if any questions or clinical situation changes please call [facility] #[phone].  Attending Attestation to follow

## 2021-10-17 NOTE — CHART NOTE - NSCHARTNOTEFT_GEN_A_CORE
Telemetry note    Sinus rhythm with rates in the 70s. One episode of rate related LBBB while patient was walking, heart rate ~110s. Otherwise, no events.    - continue to monitor

## 2021-10-17 NOTE — CONSULT NOTE ADULT - SUBJECTIVE AND OBJECTIVE BOX
HPI: 71M with PMHx CAD s/p stents (last 11/2020 to RCA on ASA/plavix), HTN, HLD, hypothyroidism, diverticulosis, BPH presenting with rectal bleeding x1 day. Pt called his GI doctor and was told to come to ED. Pt at baseline does not have rectal bleeding other than years ago once when wiping and was bright red. He believes his last C-scope was 2016 and noted to have diverticulosis in the past but no prior bleeding. Today while patient was at work he had multiple episodes of BRBPR (total 7 episodes today with 2 in the ED). The first episode had a small formed stool that was black in color but further episodes were bright red and without black stool. Over the last two months he has been experiencing intermittent lower abdominal cramping and stools alternate between formed and diarrhea. Currently he does not have abdominal pain, vomiting, diarrhea, fevers, chills, LH, LOC, dizziness. Pt is compliant with his ASA and plavix. He follows with Dr. Preciado of cardiology. Denies CP or SOB.    In ED, FOBT positive, BRBPR episodes. HD stable. Hypertensive to 170s. Hgb normal  Patient noted to have another BRBPR episode ~1240AM.  (14 Oct 2021 23:59)     No Known Allergies  	  MEDICATIONS:  aspirin enteric coated 81 milliGRAM(s) Oral daily  metoprolol succinate ER 50 milliGRAM(s) Oral daily  acetaminophen   Tablet .. 650 milliGRAM(s) Oral every 6 hours PRN  pantoprazole  Injectable 40 milliGRAM(s) IV Push every 12 hours  atorvastatin 40 milliGRAM(s) Oral at bedtime  finasteride 5 milliGRAM(s) Oral daily  levothyroxine 75 MICROGram(s) Oral daily  sodium chloride 0.9%. 1000 milliLiter(s) IV Continuous <Continuous>    PAST MEDICAL & SURGICAL HISTORY:  Coronary artery disease  Benign prostatic hypertrophy  HTN (hypertension)  Myocardial infarction acute  anterior wall 2009  CAD (coronary artery disease)  Stented coronary artery  endeavor x2 RCA 2009 - [facility]  Acute on chronic blood loss anemia  No significant past surgical history    FAMILY HISTORY:  Family history of stent (Mother)  mother living with coronary stents placed at 81yo    SUBSTANCE USE  Tobacco Usage: denies   Alcohol Usage: denies  Recreational drugs: denies    REVIEW OF SYSTEMS:  CONSTITUTIONAL: No fevers, No chills, No fatigue, No weight gain  RESPIRATORY: No shortness of breath, No cough, No wheezing, No hemoptysis  CARDIOVASCULAR: No chest pain. No palpitations, No pleuritic pain  GASTROINTESTINAL: No abdominal pain, + nausea, + vomiting, No hematemesis, No diarrhea No constipation. + melena  GENITOURINARY: No dysuria, No frequency, No incontinence, No hematuria  NEUROLOGICAL: No dizziness, No lightheadedness, + syncope, + LOC, No headache, No numbness or weakness  EXTREMITIES: No Edema, No joint pain, No joint swelling.  SKIN: No diaphoresis. No itching, No rashes, No pressure ulcers  All other review of systems is negative unless indicated above.    T(C): 36.6 (10-16-21 @ 21:25), Max: 37.1 (10-16-21 @ 09:37)  HR: 63 (10-16-21 @ 21:25) (63 - 75)  BP: 129/63 (10-16-21 @ 21:25) (122/62 - 147/83)  RR: 18 (10-16-21 @ 21:25) (16 - 18)  SpO2: 100% (10-16-21 @ 21:25) (99% - 100%)  Wt(kg): --  I&O's Summary      Physical Exam:  General: NAD  Cardiovascular: Normal S1 S2, No JVD, No murmurs, No edema  Respiratory: Lungs clear to auscultation	  Gastrointestinal:  Soft, Non-tender, + BS	  Skin: warm and dry, No rashes, No ecchymoses, No cyanosis	  Extremities:  No clubbing, cyanosis or edema  Vascular: Peripheral pulses palpable 2+ bilaterally    CBC Full  -  ( 16 Oct 2021 17:59 )  WBC Count : 9.37 K/uL  Hemoglobin : 11.8 g/dL  Hematocrit : 34.7 %  Platelet Count - Automated : 190 K/uL  Mean Cell Volume : 81.5 fL  Mean Cell Hemoglobin : 27.7 pg  Mean Cell Hemoglobin Concentration : 34.0 gm/dL    10-16    142  |  99  |  14  ----------------------------<  163<H>  3.8   |  23  |  0.90  10-16    138  |  104  |  14  ----------------------------<  157<H>  3.7   |  22  |  0.94    Ca    8.9      16 Oct 2021 06:28  Ca    9.1      16 Oct 2021 04:16  Phos  2.6     10-16  Phos  2.9     10-16  Mg     2.00     10-16  Mg     2.00     10-16    PREVIOUS DIAGNOSTIC TESTING:  < from: Transthoracic Echocardiogram (04.15.19 @ 20:55) >  CONCLUSIONS:  1. Increased relative wall thickness with normal left  ventricular mass index, consistent with concentric left  ventricular remodeling.  2. Normal left ventricular systolic function. No segmental  wall motion abnormalities.  3. Normal right ventricular size and function.  4. Estimated right ventricular systolic pressure equals 13  mm Hg, assuming right atrial pressure equals 10 mm Hg,  consistent with normal pulmonary pressures.  *** No previous Echo exam.    < end of copied text >     HPI: 71M with PMHx CAD s/p stents (last 11/2020 to RCA on ASA/plavix), HTN, HLD, hypothyroidism, diverticulosis, BPH presenting with rectal bleeding x1 day. Pt called his GI doctor and was told to come to ED. Pt at baseline does not have rectal bleeding other than years ago once when wiping and was bright red. He believes his last C-scope was 2016 and noted to have diverticulosis in the past but no prior bleeding. Today while patient was at work he had multiple episodes of BRBPR (total 7 episodes today with 2 in the ED). The first episode had a small formed stool that was black in color but further episodes were bright red and without black stool. Over the last two months he has been experiencing intermittent lower abdominal cramping and stools alternate between formed and diarrhea. Currently he does not have abdominal pain, vomiting, diarrhea, fevers, chills, LH, LOC, dizziness. Pt is compliant with his ASA and plavix. He follows with Dr. Preciado of cardiology. Denies CP or SOB.      At 4:30 AM on 10/16/2021, patient felt nauseated, got up, retched while at the sink and passed out and was subsequently found to have a rib fracture.  Patient had two previous episodes of unexplained nausea over the past 2 years with one episode of LOC.  Therefore patient has had two previous episodes of unexplained nausea resulting in syncope, with one episode resulting in rib fracture.     In ED, FOBT positive, BRBPR episodes. HD stable. Hypertensive to 170s. Hgb normal  Patient noted to have another BRBPR episode ~1240AM.  (14 Oct 2021 23:59)     No Known Allergies  	  MEDICATIONS:  aspirin enteric coated 81 milliGRAM(s) Oral daily  metoprolol succinate ER 50 milliGRAM(s) Oral daily  acetaminophen   Tablet .. 650 milliGRAM(s) Oral every 6 hours PRN  pantoprazole  Injectable 40 milliGRAM(s) IV Push every 12 hours  atorvastatin 40 milliGRAM(s) Oral at bedtime  finasteride 5 milliGRAM(s) Oral daily  levothyroxine 75 MICROGram(s) Oral daily  sodium chloride 0.9%. 1000 milliLiter(s) IV Continuous <Continuous>    PAST MEDICAL & SURGICAL HISTORY:  Coronary artery disease  Benign prostatic hypertrophy  HTN (hypertension)  Myocardial infarction acute  anterior wall 2009  CAD (coronary artery disease)  Stented coronary artery  [facility]  Acute on chronic blood loss anemia  No significant past surgical history    FAMILY HISTORY:  Family history of stent (Mother)  mother living with coronary stents placed at 79yo    SUBSTANCE USE  Tobacco Usage: denies   Alcohol Usage: denies  Recreational drugs: denies    REVIEW OF SYSTEMS:  CONSTITUTIONAL: No fevers, No chills, No fatigue, No weight gain  RESPIRATORY: No shortness of breath, No cough, No wheezing, No hemoptysis  CARDIOVASCULAR: No chest pain. No palpitations, No pleuritic pain  GASTROINTESTINAL: No abdominal pain, + nausea, + vomiting, No hematemesis, No diarrhea No constipation. + melena  GENITOURINARY: No dysuria, No frequency, No incontinence, No hematuria  NEUROLOGICAL: No dizziness, No lightheadedness, + syncope, + LOC, No headache, No numbness or weakness  EXTREMITIES: No Edema, No joint pain, No joint swelling.  SKIN: No diaphoresis. No itching, No rashes, No pressure ulcers  All other review of systems is negative unless indicated above.    T(C): 36.6 (10-16-21 @ 21:25), Max: 37.1 (10-16-21 @ 09:37)  HR: 63 (10-16-21 @ 21:25) (63 - 75)  BP: 129/63 (10-16-21 @ 21:25) (122/62 - 147/83)  RR: 18 (10-16-21 @ 21:25) (16 - 18)  SpO2: 100% (10-16-21 @ 21:25) (99% - 100%)  Wt(kg): --  I&O's Summary      Physical Exam:  General: NAD  Cardiovascular: Normal S1 S2, No JVD, No murmurs, No edema  Respiratory: Lungs clear to auscultation	  Gastrointestinal:  Soft, Non-tender, + BS	  Skin: warm and dry, No rashes, No ecchymoses, No cyanosis	  Extremities:  No clubbing, cyanosis or edema  Vascular: Peripheral pulses palpable 2+ bilaterally    CBC Full  -  ( 16 Oct 2021 17:59 )  WBC Count : 9.37 K/uL  Hemoglobin : 11.8 g/dL  Hematocrit : 34.7 %  Platelet Count - Automated : 190 K/uL  Mean Cell Volume : 81.5 fL  Mean Cell Hemoglobin : 27.7 pg  Mean Cell Hemoglobin Concentration : 34.0 gm/dL    10-16    142  |  99  |  14  ----------------------------<  163<H>  3.8   |  23  |  0.90  10-16    138  |  104  |  14  ----------------------------<  157<H>  3.7   |  22  |  0.94    Ca    8.9      16 Oct 2021 06:28  Ca    9.1      16 Oct 2021 04:16  Phos  2.6     10-16  Phos  2.9     10-16  Mg     2.00     10-16  Mg     2.00     10-16    PREVIOUS DIAGNOSTIC TESTING:  < from: Transthoracic Echocardiogram (04.15.19 @ 20:55) >  CONCLUSIONS:  1. Increased relative wall thickness with normal left  ventricular mass index, consistent with concentric left  ventricular remodeling.  2. Normal left ventricular systolic function. No segmental  wall motion abnormalities.  3. Normal right ventricular size and function.  4. Estimated right ventricular systolic pressure equals 13  mm Hg, assuming right atrial pressure equals 10 mm Hg,  consistent with normal pulmonary pressures.  *** No previous Echo exam.    < end of copied text >

## 2021-10-18 PROBLEM — D62 ACUTE POSTHEMORRHAGIC ANEMIA: Chronic | Status: ACTIVE | Noted: 2021-10-16

## 2021-10-18 LAB
ANION GAP SERPL CALC-SCNC: 11 MMOL/L — SIGNIFICANT CHANGE UP (ref 7–14)
BUN SERPL-MCNC: 13 MG/DL — SIGNIFICANT CHANGE UP (ref 7–23)
CALCIUM SERPL-MCNC: 8.3 MG/DL — LOW (ref 8.4–10.5)
CHLORIDE SERPL-SCNC: 104 MMOL/L — SIGNIFICANT CHANGE UP (ref 98–107)
CO2 SERPL-SCNC: 24 MMOL/L — SIGNIFICANT CHANGE UP (ref 22–31)
CREAT SERPL-MCNC: 0.93 MG/DL — SIGNIFICANT CHANGE UP (ref 0.5–1.3)
GLUCOSE SERPL-MCNC: 134 MG/DL — HIGH (ref 70–99)
HCT VFR BLD CALC: 32.2 % — LOW (ref 39–50)
HGB BLD-MCNC: 10.7 G/DL — LOW (ref 13–17)
MAGNESIUM SERPL-MCNC: 2.2 MG/DL — SIGNIFICANT CHANGE UP (ref 1.6–2.6)
MCHC RBC-ENTMCNC: 27.4 PG — SIGNIFICANT CHANGE UP (ref 27–34)
MCHC RBC-ENTMCNC: 33.2 GM/DL — SIGNIFICANT CHANGE UP (ref 32–36)
MCV RBC AUTO: 82.6 FL — SIGNIFICANT CHANGE UP (ref 80–100)
NRBC # BLD: 0 /100 WBCS — SIGNIFICANT CHANGE UP
NRBC # FLD: 0 K/UL — SIGNIFICANT CHANGE UP
PHOSPHATE SERPL-MCNC: 3 MG/DL — SIGNIFICANT CHANGE UP (ref 2.5–4.5)
PLATELET # BLD AUTO: 177 K/UL — SIGNIFICANT CHANGE UP (ref 150–400)
POTASSIUM SERPL-MCNC: 3.5 MMOL/L — SIGNIFICANT CHANGE UP (ref 3.5–5.3)
POTASSIUM SERPL-SCNC: 3.5 MMOL/L — SIGNIFICANT CHANGE UP (ref 3.5–5.3)
RBC # BLD: 3.9 M/UL — LOW (ref 4.2–5.8)
RBC # FLD: 13.2 % — SIGNIFICANT CHANGE UP (ref 10.3–14.5)
SODIUM SERPL-SCNC: 139 MMOL/L — SIGNIFICANT CHANGE UP (ref 135–145)
WBC # BLD: 7.74 K/UL — SIGNIFICANT CHANGE UP (ref 3.8–10.5)
WBC # FLD AUTO: 7.74 K/UL — SIGNIFICANT CHANGE UP (ref 3.8–10.5)

## 2021-10-18 PROCEDURE — 70140 X-RAY EXAM OF FACIAL BONES: CPT | Mod: 26

## 2021-10-18 PROCEDURE — 99233 SBSQ HOSP IP/OBS HIGH 50: CPT

## 2021-10-18 RX ORDER — POTASSIUM CHLORIDE 20 MEQ
40 PACKET (EA) ORAL ONCE
Refills: 0 | Status: COMPLETED | OUTPATIENT
Start: 2021-10-18 | End: 2021-10-18

## 2021-10-18 RX ADMIN — Medication 40 MILLIEQUIVALENT(S): at 10:15

## 2021-10-18 RX ADMIN — Medication 75 MICROGRAM(S): at 06:08

## 2021-10-18 RX ADMIN — Medication 50 MILLIGRAM(S): at 10:14

## 2021-10-18 RX ADMIN — PANTOPRAZOLE SODIUM 40 MILLIGRAM(S): 20 TABLET, DELAYED RELEASE ORAL at 17:41

## 2021-10-18 RX ADMIN — PANTOPRAZOLE SODIUM 40 MILLIGRAM(S): 20 TABLET, DELAYED RELEASE ORAL at 06:08

## 2021-10-18 RX ADMIN — FINASTERIDE 5 MILLIGRAM(S): 5 TABLET, FILM COATED ORAL at 10:14

## 2021-10-18 RX ADMIN — Medication 81 MILLIGRAM(S): at 10:14

## 2021-10-18 RX ADMIN — ATORVASTATIN CALCIUM 40 MILLIGRAM(S): 80 TABLET, FILM COATED ORAL at 21:15

## 2021-10-18 NOTE — CHART NOTE - NSCHARTNOTEFT_GEN_A_CORE
pt and wife notified staff increased nasal swelling s/p fall with facial laceration requiring sutures and 6th rib fx.  will order xray r/o nasal fx

## 2021-10-18 NOTE — CHART NOTE - NSCHARTNOTEFT_GEN_A_CORE
Called by primary team that patient had nausea/vomiting prior to syncope.    Per primary team, those symptoms have resolved.    There is no plan for upper endoscopy for transient nausea/vomiting that has resolved.    Patient's GI bleeding was from his diverticulosis and successfully treated.    Diet as tolerated. Rest of care per primary team. No further inpatient GI work-up planned. Patient should see outpatient GI doctors (with whom he has a long-standing relationship) on discharge.     Shun Goss  Gastroenterology/Hepatology Fellow  Available via [employer] Teams    NON-URGENT CONSULTS:  Please email [email or web address]  [email or web address]  AT NIGHT AND ON WEEKENDS:  Contact on-call GI fellow via answering service ([phone]) from 5pm-8am and on weekends/holidays  MONDAY-FRIDAY 8AM-5PM:  Pager# [phone] ([facility]) or [phone] ([facility])  GI Phone# [phone] ([facility])

## 2021-10-18 NOTE — PROGRESS NOTE ADULT - ASSESSMENT
71M w/ CAD s/p stents (last 11/2020 to RCA on ASA/plavix), HTN, HLD, hypothyroidism, diverticulosis, BPH presenting with rectal bleeding s/p simoidoscopy with e/o diverticular bleed s/p clipping.  Hospital course c/b syncopal episode after nausea and dry heaving, leading to rib fracture and contusion to head. Of note patient had similar episode of syncope and N/V preceding in 2019.    # Syncope  - Given constellation of symptom and onset, likely vasovagal response more than conduction disease. No evidence of conduction disease on ECG and pt has no previous arrhythmias.   - CT head showed no intracranial hemorrhage, acute lobar infarction or cerebral edema.  - EKG with sinus rhythm at 65 bpm without acute ischemic changes  - continue tele monitoring  - TTE pending.  - GI: no inpatient workup as patient's retching event resolved. outpatient follow up.   - antiplatelet therapy per primary team and Gen cards.   71M w/ CAD s/p stents (last 11/2020 to RCA on ASA/plavix), HTN, HLD, hypothyroidism, diverticulosis, BPH presenting with rectal bleeding s/p simoidoscopy with e/o diverticular bleed s/p clipping.  Hospital course c/b syncopal episode after nausea and dry heaving, leading to rib fracture and contusion to head. Of note patient had similar episode of syncope and N/V preceding in 2019.    # Syncope  - Given constellation of symptom and onset, likely vasovagal response more than conduction disease. No evidence of conduction disease on ECG and pt has no previous arrhythmias.   - CT head showed no intracranial hemorrhage, acute lobar infarction or cerebral edema.  - EKG with sinus rhythm at 65 bpm without acute ischemic changes  - continue tele monitoring  - TTE pending.  - GI: no inpatient workup as patient's retching event resolved. outpatient follow up.   - antiplatelet therapy per primary team and Gen cards.  - no plan for PPM while inpatient. ILR unlikely yield significant data point as patient's nasua/dry heaves episode with syncope occurs infrequently.

## 2021-10-18 NOTE — PHYSICAL THERAPY INITIAL EVALUATION ADULT - GENERAL OBSERVATIONS, REHAB EVAL
patient is a 71 year old male admitted for rectal bleeding. course c/b fall on unit. PMH listed below

## 2021-10-18 NOTE — PROGRESS NOTE ADULT - ATTENDING COMMENTS
71M w/ CAD s/p stents (last 11/2020 to RCA on ASA/plavix), HTN, HLD, hypothyroidism, diverticulosis, BPH presenting with rectal bleeding s/p simoidoscopy with e/o diverticular bleed s/p clipping.  Hospital course c/b syncopal episode after nausea and dry heaving, leading to rib fracture and contusion to head. Of note patient had similar episode of syncope and N/V preceding in 2019. Likely vasovagal- vasodepressor effect given episode in hospital on tele with no pause. Refusing ILR. Will follow. 71M w/ CAD s/p stents (last 11/2020 to RCA on ASA/plavix), HTN, HLD, hypothyroidism, diverticulosis, BPH presenting with rectal bleeding s/p sigmoidoscopy with e/o diverticular bleed s/p clipping.  Hospital course c/b syncopal episode after nausea and dry heaving, leading to rib fracture and contusion to head. Of note patient had similar episode of syncope and N/V preceding in 2019. Likely vasovagal- patient not on telemetry at the time of syncope. Refusing ILR. Will follow.

## 2021-10-18 NOTE — PROGRESS NOTE ADULT - ASSESSMENT
_________________________________________________________________________________________  ========>>  M E D I C A L   A T T E N D I N G    F O L L O W  U P  N O T E  <<=========  -----------------------------------------------------------------------------------------------------    - Patient seen and examined by me earlier today.   - In summary,  GONZÁLEZ WHITLOCK is a 71y year old man admitted with rectal bleed   - Patient today overall doing ok, comfortable, no fresh GI bleed reported ( + small BM today : normal ), no dizziness    pt's wife reportedly had concern about nasal swelling ( none appreciated by me) > Xray of facial bones ordered by team, pending     ==================>> REVIEW OF SYSTEM <<=================    GEN: no fever, no chills, no pain  RESP: no SOB, no cough, no sputum  CVS: no chest pain, no palpitations, no edema  GI: no abdominal pain, no nausea at this time , as above   : no dysuria, no frequency, no hematuria  Neuro: no headache, no dizziness  Derm : no itching, no rash    ==================>> PHYSICAL EXAM <<=================    GEN: A&O X 3 , NAD , comfortable, pleasant, calm in chair  HEENT: left facial laceration repaired, intact, dressing removed, tiny ecchymosis around it )  PERRL, MMM, hearing intact  Neck: supple , no JVD appreciated  CVS: S1S2 , regular , No M/R/G appreciated  PULM: CTA B/L,  no W/R/R appreciated, IS at bedside   ABD.: soft. non tender, non distended,  bowel sounds present  Extrem: intact pulses , no edema   PSYCH : normal mood,  not anxious                             ( Note written / Date of service 10-18-21 )    ==================>> MEDICATIONS <<====================    aspirin enteric coated 81 milliGRAM(s) Oral daily  atorvastatin 40 milliGRAM(s) Oral at bedtime  finasteride 5 milliGRAM(s) Oral daily  levothyroxine 75 MICROGram(s) Oral daily  metoprolol succinate ER 50 milliGRAM(s) Oral daily  pantoprazole  Injectable 40 milliGRAM(s) IV Push every 12 hours  sodium chloride 0.9%. 1000 milliLiter(s) IV Continuous <Continuous>    MEDICATIONS  (PRN):  acetaminophen   Tablet .. 650 milliGRAM(s) Oral every 6 hours PRN Temp greater or equal to 38C (100.4F), Mild Pain (1 - 3)  melatonin 3 milliGRAM(s) Oral at bedtime PRN Insomnia    ___________  Active diet:  Diet, Regular:   DASH/TLC Sodium & Cholesterol Restricted (DASH)  ___________________    ==================>> VITAL SIGNS <<==================  Height (cm): 170.2  Weight (kg): 88.3  BMI (kg/m2): 30.5  Vital Signs Last 24 HrsT(C): 36.6 (10-18-21 @ 14:00)  T(F): 97.8 (10-18-21 @ 14:00), Max: 98 (10-17-21 @ 21:00)  HR: 63 (10-18-21 @ 14:00) (60 - 77)  BP: 120/56 (10-18-21 @ 14:00)  RR: 18 (10-18-21 @ 14:00) (16 - 18)  SpO2: 100% (10-18-21 @ 14:00) (98% - 100%)       ==================>> LAB AND IMAGING <<==================                        10.7   7.74  )-----------( 177      ( 18 Oct 2021 07:24 )             32.2        10-18    139  |  104  |  13  ----------------------------<  134<H>  3.5   |  24  |  0.93    Ca    8.3<L>      18 Oct 2021 07:24  Phos  3.0     10-18  Mg     2.20     10-18      WBC count:   7.74 <<== ,  8.06 <<== ,  8.57 <<== ,  10.15 <<== ,  9.37 <<== ,  11.91 <<==   Hemoglobin:   10.7 <<==,  11.3 <<==,  11.5 <<==,  12.1 <<==,  11.8 <<==,  12.0 <<==  platelets:  177 <==, 178 <==, 200 <==, 219 <==, 190 <==, 188 <==, 187 <==    Creatinine:  0.93  <<==, 1.05  <<==, 0.90  <<==, 0.94  <<==, 0.99  <<==  Sodium:   139  <==, 139  <==, 142  <==, 138  <==, 143  <==      < from: Flexible Sigmoidoscopy (10.15.21 @ 17:42) >  Impression:          - Active bleeding coming from the diverticular opening                        in the descending colon. 5 clips were placed.                       - Multiple diverticulum seen in the sigmoid, descending                        and transverse colon.                       - No specimens collected.  Recommendation:      - Return patient to hospital [facility] for ongoing care.                       - Clear liquid diet today.           - Monitor CBC every 8-12 hours, transfuse to Hb > 8 as                        needed.                       - Continue anti-platelet therapy as per Cardiology/                        primary team.                       - If patient experiences further bleeding, consider IR                        evaluation for embolization.  < end of copied text >  ___________________________________________________________________________________  ===============>>  A S S E S S M E N T   A N D   P L A N <<===============  ------------------------------------------------------------------------------------------    · Assessment	  71M with PMHx CAD s/p stents (last 11/2020 to RCA on ASA/plavix), HTN, HLD, hypothyroidism, diverticulosis, BPH presenting with rectal bleeding x1 day. FOBT positive, BRBPR in ED    Problem/Plan - :  ·  Problem:  Syncope, fall with facial laceration and rib fracture       I had long discussions with pt, EP and cardio        Syncope thought to be vasovagal in nature          no significant arrythmias / abnormalities seen on tele as reviewed by EP      this is the second such episode of syncope for pt with prodromal GI symptoms of nausea, retching / vomiting ..         EP / cardio asking for EGD to be done >> appreciated GI >> do as OP ( pt in agreement)               no plan for loop recorder ( pt also declining)     continue tele monitoring for now  other workup considered as well ( cath/ PPM? Loop) but one recommended at this time      pt states has had recent negative stress, carotid duplx / echo in office as well            pt also prefer to do echo as OP         doubt if facial xray would    Continue Current medications otherwise and monitor.  Dispo planing otherwise     Problem/Plan - 1:  ·  Problem: Rectal bleeding.        BRBPR , multiple episodes, due to diverticular bleeding post clipping as above   appreciated all specialty follow up and mgmt   ASA already resumed >> to hold plavix indefinitely for now   -monitor CBC given acute blood loss anemia      Hemoglobin:   10.7 <<==,  11.3 <<==,  11.5 <<==,  12.1 <<==,  11.8 <<==,  12.0 <<==      Problem/Plan - 2:  ·  Problem: CAD X 5 stents   ·  Plan: On ASA;  plavix to hold   Cardiology on board   statin, BB.    Problem/Plan - 3:  ·  Problem: HTN   Continue Current medications otherwise and monitor.   cardio  following     Problem/Plan - 4:  ·  Problem: BPH (benign prostatic hyperplasia).   ·  Plan: Resume finasteride.    Problem/Plan - 5:  ·  Problem: Hypothyroidism.   ·  Plan: Resume levothyroxine 75mcg qd.    Problem/Plan - 6:  ·  Problem: Need for prophylactic measure.   ·  Plan: Patient is ambulatory so low risk for VTE. Hold chemical DVT ppx  diet     --------------------------------------------  Case discussed with pt.. cardio, EP, NP, ...   Education given on findings and plan of care    I had a prolonged conversation with the patient, GONZÁLEZ WHITLOCK, re diagnosis, findings, and plan of care, prognosis, alternate treatments / options . Patient verbalized clear understanding, many many questions answered.     also discussed with pt re proper use / timimg of meds ... ( pt at home takes metoprolol, plavix, lovaza , vitamin D and C all at night and takes synthroid, PPI and aspirin in AM on empty stomach...         pt extensively educated on proper / timinmg of meds, plavix and lovaza being stopped per conversation with cardio, and Rx given for zofran ODT as above          also discussed strategies to thwart vasovagal syncope and all questions answered     Additional time spent approx. 45 min.    ___________________________  H. EDOUARD Roche.  Pager: [phone]

## 2021-10-18 NOTE — PROGRESS NOTE ADULT - SUBJECTIVE AND OBJECTIVE BOX
Patient is a 71y old  Male who presents with a chief complaint of rectal bleeding (17 Oct 2021 14:17)      SUBJECTIVE / OVERNIGHT EVENTS:  no acute events overnight.   patient sitting on chair, denies chest pain, palpitation, sob.        MEDICATIONS  (STANDING):  aspirin enteric coated 81 milliGRAM(s) Oral daily  atorvastatin 40 milliGRAM(s) Oral at bedtime  finasteride 5 milliGRAM(s) Oral daily  levothyroxine 75 MICROGram(s) Oral daily  metoprolol succinate ER 50 milliGRAM(s) Oral daily  pantoprazole  Injectable 40 milliGRAM(s) IV Push every 12 hours  sodium chloride 0.9%. 1000 milliLiter(s) (65 mL/Hr) IV Continuous <Continuous>    MEDICATIONS  (PRN):  acetaminophen   Tablet .. 650 milliGRAM(s) Oral every 6 hours PRN Temp greater or equal to 38C (100.4F), Mild Pain (1 - 3)  melatonin 3 milliGRAM(s) Oral at bedtime PRN Insomnia      T(C): 36.4 (10-18-21 @ 10:00), Max: 36.7 (10-17-21 @ 21:00)  HR: 69 (10-18-21 @ 10:00) (60 - 77)  BP: 148/85 (10-18-21 @ 10:00) (134/69 - 150/78)  RR: 18 (10-18-21 @ 10:00) (16 - 18)  SpO2: 100% (10-18-21 @ 10:00) (98% - 100%)  CAPILLARY BLOOD GLUCOSE        I&O's Summary      PHYSICAL EXAM:  General: NAD  Cardiovascular: Normal S1 S2, No JVD, No murmurs, No edema  Respiratory: Lungs clear to auscultation	  Gastrointestinal:  Soft, Non-tender, + BS	  Skin: warm and dry, No rashes, No ecchymoses, No cyanosis	  Extremities:  No clubbing, cyanosis or edema  Vascular: Peripheral pulses palpable 2+ bilaterally    LABS:                        10.7   7.74  )-----------( 177      ( 18 Oct 2021 07:24 )             32.2     10-18    139  |  104  |  13  ----------------------------<  134<H>  3.5   |  24  |  0.93    Ca    8.3<L>      18 Oct 2021 07:24  Phos  3.0     10-18  Mg     2.20     10-18                RADIOLOGY & ADDITIONAL TESTS:    Imaging Personally Reviewed: CHARI    Consultant(s) Notes Reviewed:  CHARI    Care Discussed with Consultants/Other Providers: CHARI

## 2021-10-18 NOTE — PHYSICAL THERAPY INITIAL EVALUATION ADULT - ADDITIONAL COMMENTS
as per chart, fall was likely vasovagal in nature. patient reports dry heaving due to Nausea prior to fall.

## 2021-10-18 NOTE — CHART NOTE - NSCHARTNOTESELECT_GEN_ALL_CORE
Event Note
RRT/fall/Event Note
Tele/Event Note
Brief GI Note/Event Note
Event Note
Event Note
GI Update/Event Note
interventional radiology/Event Note

## 2021-10-19 ENCOUNTER — TRANSCRIPTION ENCOUNTER (OUTPATIENT)
Age: 71
End: 2021-10-19

## 2021-10-19 VITALS
HEART RATE: 75 BPM | TEMPERATURE: 98 F | DIASTOLIC BLOOD PRESSURE: 72 MMHG | RESPIRATION RATE: 18 BRPM | OXYGEN SATURATION: 99 % | SYSTOLIC BLOOD PRESSURE: 140 MMHG

## 2021-10-19 LAB
ANION GAP SERPL CALC-SCNC: 16 MMOL/L — HIGH (ref 7–14)
BUN SERPL-MCNC: 17 MG/DL — SIGNIFICANT CHANGE UP (ref 7–23)
CALCIUM SERPL-MCNC: 9 MG/DL — SIGNIFICANT CHANGE UP (ref 8.4–10.5)
CHLORIDE SERPL-SCNC: 102 MMOL/L — SIGNIFICANT CHANGE UP (ref 98–107)
CO2 SERPL-SCNC: 23 MMOL/L — SIGNIFICANT CHANGE UP (ref 22–31)
CREAT SERPL-MCNC: 0.95 MG/DL — SIGNIFICANT CHANGE UP (ref 0.5–1.3)
GLUCOSE SERPL-MCNC: 138 MG/DL — HIGH (ref 70–99)
HCT VFR BLD CALC: 34.6 % — LOW (ref 39–50)
HGB BLD-MCNC: 11.4 G/DL — LOW (ref 13–17)
MAGNESIUM SERPL-MCNC: 2.1 MG/DL — SIGNIFICANT CHANGE UP (ref 1.6–2.6)
MCHC RBC-ENTMCNC: 27.6 PG — SIGNIFICANT CHANGE UP (ref 27–34)
MCHC RBC-ENTMCNC: 32.9 GM/DL — SIGNIFICANT CHANGE UP (ref 32–36)
MCV RBC AUTO: 83.8 FL — SIGNIFICANT CHANGE UP (ref 80–100)
NRBC # BLD: 0 /100 WBCS — SIGNIFICANT CHANGE UP
NRBC # FLD: 0 K/UL — SIGNIFICANT CHANGE UP
PHOSPHATE SERPL-MCNC: 3.1 MG/DL — SIGNIFICANT CHANGE UP (ref 2.5–4.5)
PLATELET # BLD AUTO: 201 K/UL — SIGNIFICANT CHANGE UP (ref 150–400)
POTASSIUM SERPL-MCNC: 4.1 MMOL/L — SIGNIFICANT CHANGE UP (ref 3.5–5.3)
POTASSIUM SERPL-SCNC: 4.1 MMOL/L — SIGNIFICANT CHANGE UP (ref 3.5–5.3)
RBC # BLD: 4.13 M/UL — LOW (ref 4.2–5.8)
RBC # FLD: 13.1 % — SIGNIFICANT CHANGE UP (ref 10.3–14.5)
SODIUM SERPL-SCNC: 141 MMOL/L — SIGNIFICANT CHANGE UP (ref 135–145)
WBC # BLD: 8.78 K/UL — SIGNIFICANT CHANGE UP (ref 3.8–10.5)
WBC # FLD AUTO: 8.78 K/UL — SIGNIFICANT CHANGE UP (ref 3.8–10.5)

## 2021-10-19 RX ORDER — CLOPIDOGREL BISULFATE 75 MG/1
1 TABLET, FILM COATED ORAL
Qty: 0 | Refills: 0 | DISCHARGE

## 2021-10-19 RX ORDER — ACETAMINOPHEN 500 MG
2 TABLET ORAL
Qty: 0 | Refills: 0 | DISCHARGE
Start: 2021-10-19

## 2021-10-19 RX ADMIN — Medication 50 MILLIGRAM(S): at 05:10

## 2021-10-19 RX ADMIN — Medication 81 MILLIGRAM(S): at 13:04

## 2021-10-19 RX ADMIN — PANTOPRAZOLE SODIUM 40 MILLIGRAM(S): 20 TABLET, DELAYED RELEASE ORAL at 05:11

## 2021-10-19 RX ADMIN — FINASTERIDE 5 MILLIGRAM(S): 5 TABLET, FILM COATED ORAL at 13:03

## 2021-10-19 RX ADMIN — Medication 75 MICROGRAM(S): at 05:10

## 2021-10-19 NOTE — DISCHARGE NOTE NURSING/CASE MANAGEMENT/SOCIAL WORK - PATIENT PORTAL LINK FT
You can access the FollowMyHealth Patient Portal offered by [facility] by registering at the following website: [email or web address]. By joining [employer]’s FollowMyHealth portal, you will also be able to view your health information using other applications (apps) compatible with our system.

## 2021-10-19 NOTE — DISCHARGE NOTE PROVIDER - HOSPITAL COURSE
71M with PMHx CAD s/p stents (last 11/2020 to RCA on ASA/plavix), HTN, HLD, hypothyroidism, diverticulosis, BPH presenting with rectal bleeding x1 day. FOBT positive, BRBPR in ED   Hospital Course:    · Syncope, fall with facial laceration and rib fracture     Per EP and Cards : Syncope thought to be vasovagal in nature, no plan for loop recorder ( pt also declining)      pt states has had recent negative stress, carotid duplx / echo in office as well   s/p Facial Xrays: No Fractures Noted   Continue Current medications otherwise and monitor.       · Rectal bleeding.        BRBPR , multiple episodes, due to diverticular bleeding post clipping as above   appreciated all specialty follow up and mgmt   ASA already resumed >> to hold plavix indefinitely for now   -monitor CBC given acute blood loss anemia      Hemoglobin:   10.7 <<==,  11.3 <<==,  11.5 <<==,  12.1 <<==,  11.8 <<==,  12.0 <<==       Problem/Plan - 2:  ·  Problem: CAD X 5 stents   ·  Plan: On ASA;  plavix to hold   Cardiology on board   statin, BB.     Problem/Plan - 3:  ·  Problem: HTN   Continue Current medications otherwise and monitor.   cardio  following      Problem/Plan - 4:  ·  Problem: BPH (benign prostatic hyperplasia).   ·  Plan: Resume finasteride.     Problem/Plan - 5:  ·  Problem: Hypothyroidism.   ·  Plan: Resume levothyroxine 75mcg qd.   71M with PMHx CAD s/p stents (last 11/2020 to RCA on ASA/plavix), HTN, HLD, hypothyroidism, diverticulosis, BPH presenting with rectal bleeding x1 day. FOBT positive, BRBPR in ED   Hospital Course:    · Syncope, fall with facial laceration and rib fracture     Per EP and Cards : Syncope thought to be vasovagal in nature, no plan for loop recorder ( pt also declining)      pt states has had recent negative stress, carotid duplx / echo in office as well   s/p Facial Xrays: No Fractures Noted   Continue Current medications otherwise and monitor.    · Rectal bleeding. s/p Flex sigmoidocopy -  +Active bleeding coming from the diverticular opening in descending colon. 5 clips were placed. No specimens collected. No surgical intervention.  --Advanced Diet today -> Full->regular as tolerated; Resumed ASA on 10/16,HOLD plavix     Follow up with GI as outpt.    ·  CAD X 5 stents   ·  Plan: On ASA;  plavix to hold  statin, BB.    · HTN   Continue Current medications otherwise and monitor.   cardio  following     ·  BPH (benign prostatic hyperplasia).   ·  Plan: Resume finasteride.    ·  Hypothyroidism.   ·  Plan: Resume levothyroxine 75mcg qd.   71M with PMHx CAD s/p stents (last 11/2020 to RCA on ASA/plavix), HTN, HLD, hypothyroidism, diverticulosis, BPH presenting with rectal bleeding x1 day. FOBT positive, BRBPR in ED   Hospital Course:    · Syncope, fall with facial laceration and rib fracture     Per EP and Cards : Syncope thought to be vasovagal in nature, no plan for loop recorder ( pt also declining)      pt states has had recent negative stress, carotid duplx / echo in office as well   s/p Facial Xrays: No Fractures Noted   Continue Current medications otherwise and monitor.    · Rectal bleeding. s/p Flex sigmoidocopy -  +Active bleeding coming from the diverticular opening in descending colon. 5 clips were placed. No specimens collected. No surgical intervention.  --Advanced Diet today -> Full->regular as tolerated; Resumed ASA on 10/16,HOLD plavix     Follow up with GI as outpt.    ·  CAD X 5 stents   ·  Plan: On ASA;  plavix on hold  statin, BB.    · HTN   Continue Current medications otherwise and monitor.   cardio  following     ·  BPH (benign prostatic hyperplasia).   ·  Plan: Resume finasteride.    ·  Hypothyroidism.   ·  Plan: Resume levothyroxine 75mcg qd.  Case discussed with attending, Pt is Stable for discharge.

## 2021-10-19 NOTE — DISCHARGE NOTE PROVIDER - NSDCMRMEDTOKEN_GEN_ALL_CORE_FT
Aspirin Enteric Coated 81 mg oral delayed release tablet: 1 tab(s) orally once a day  finasteride 5 mg oral tablet: 1 tab(s) orally once a day  Lipitor 40 mg oral tablet: 1 tab(s) orally once a day  Lovaza 1000 mg oral capsule: 2 cap(s) orally once a day  metoprolol succinate 50 mg oral tablet, extended release: 1 tab(s) orally once a day  pantoprazole 40 mg oral delayed release tablet: 1 tab(s) orally once a day (before a meal)  Plavix 75 mg oral tablet: 1 tab(s) orally once a day for minimum 1 year  Synthroid 75 mcg (0.075 mg) oral tablet: 1 tab(s) orally once a day   acetaminophen 325 mg oral tablet: 2 tab(s) orally every 6 hours, As needed, Temp greater or equal to 38C (100.4F), Mild Pain (1 - 3)  Aspirin Enteric Coated 81 mg oral delayed release tablet: 1 tab(s) orally once a day  finasteride 5 mg oral tablet: 1 tab(s) orally once a day  Lipitor 40 mg oral tablet: 1 tab(s) orally once a day  Lovaza 1000 mg oral capsule: 2 cap(s) orally once a day  metoprolol succinate 50 mg oral tablet, extended release: 1 tab(s) orally once a day  pantoprazole 40 mg oral delayed release tablet: 1 tab(s) orally once a day (before a meal)  Synthroid 75 mcg (0.075 mg) oral tablet: 1 tab(s) orally once a day

## 2021-10-19 NOTE — DISCHARGE NOTE PROVIDER - NSDCCAREPROVSEEN_GEN_ALL_CORE_FT
Hoorbod Delshadfar  Hoorbod Delshadfar  Hoorbod Delshadfar  Joey Johnson  Ordering Physician  User ADM  Donnie Preciado  Team [facility] Interventional Radiology  Team [facility] Medicine ACP

## 2021-10-19 NOTE — PROVIDER CONTACT NOTE (OTHER) - RECOMMENDATIONS
acp made aware will continue to monitor.
acp made aware. metoprolol given will continue to monitor.
acp radha made aware and came to bedside to encourage and educate patient. pt agreed to 00 draw but no future draws.
pt states he takes 50mg of Metoprolol at home

## 2021-10-19 NOTE — DISCHARGE NOTE PROVIDER - NSDCFUSCHEDAPPT_GEN_ALL_CORE_FT
GONZÁLEZ WHITLOCK ; 11/02/2021 ; P [facility]io [facility] [address] GONZÁLEZ WHITLOCK ; 11/01/2021 ; NPP Med Gastro [address] GONZÁLEZ Melvin ; 11/02/2021 ; NPP [facility] [id number]

## 2021-10-19 NOTE — PROGRESS NOTE ADULT - ASSESSMENT
M E D I C A L   A T T E N D I N G    F O L L O W    U P   N O T E  (10-19-21 )                                     ------------------------------------------------------------------------------------------------    patient evaluated by me, case discussed with team, chart, medications, and physical exam reviewed, labs / tests  and vitals reviewed by me, as bellow.   Patient is stable for discharge today. discussed with EP team at bedside and cardiologist.. continue  meds as planned including rest of amio loading..   Patient to follow up with  Cardio / EP, monitor BP on metoprolol and adjust as needed   See discharge document for full note.                             ( note written / Date of service  10-19-21 )    ==================>> MEDICATIONS <<====================    aspirin enteric coated 81 milliGRAM(s) Oral daily  atorvastatin 40 milliGRAM(s) Oral at bedtime  finasteride 5 milliGRAM(s) Oral daily  levothyroxine 75 MICROGram(s) Oral daily  metoprolol succinate ER 50 milliGRAM(s) Oral daily  pantoprazole  Injectable 40 milliGRAM(s) IV Push every 12 hours  sodium chloride 0.9%. 1000 milliLiter(s) IV Continuous <Continuous>    MEDICATIONS  (PRN):  acetaminophen   Tablet .. 650 milliGRAM(s) Oral every 6 hours PRN Temp greater or equal to 38C (100.4F), Mild Pain (1 - 3)  melatonin 3 milliGRAM(s) Oral at bedtime PRN Insomnia    ___________  Active diet:  Diet, Regular:   DASH/TLC Sodium & Cholesterol Restricted (DASH)  ___________________    ==================>> VITAL SIGNS <<==================    T(C): 36.7 (10-19-21 @ 09:00), Max: 36.8 (10-19-21 @ 02:00)  HR: 66 (10-19-21 @ 09:00) (61 - 76)  BP: 162/82 (10-19-21 @ 09:00) (120/56 - 176/88)  RR: 18 (10-19-21 @ 09:00) (18 - 18)  SpO2: 99% (10-19-21 @ 09:00) (98% - 100%)     I&O's Summary    19 Oct 2021 07:01  -  19 Oct 2021 12:12  --------------------------------------------------------  IN: 240 mL / OUT: 0 mL / NET: 240 mL       ==================>> LAB AND IMAGING <<==================                        11.4   8.78  )-----------( 201      ( 19 Oct 2021 07:17 )             34.6        10-19    141  |  102  |  17  ----------------------------<  138<H>  4.1   |  23  |  0.95    Ca    9.0      19 Oct 2021 07:17  Phos  3.1     10-19  Mg     2.10     10-19    WBC count:   8.78 <<== ,  7.74 <<== ,  8.06 <<== ,  8.57 <<== ,  10.15 <<== ,  9.37 <<==   Hemoglobin:   11.4 <<==,  10.7 <<==,  11.3 <<==,  11.5 <<==,  12.1 <<==,  11.8 <<==  platelets:  201 <==, 177 <==, 178 <==, 200 <==, 219 <==, 190 <==, 188 <==    Creatinine:  0.95  <<==, 0.93  <<==, 1.05  <<==, 0.90  <<==, 0.94  <<==, 0.99  <<==  Sodium:   141  <==, 139  <==, 139  <==, 142  <==, 138  <==, 143  <==    facial xray report reviewed                                                M E D I C A L   A T T E N D I N G    F O L L O W    U P   N O T E  (10-19-21 )                                     ------------------------------------------------------------------------------------------------    patient evaluated by me, case discussed with team, chart, medications, and physical exam reviewed, labs / tests  and vitals reviewed by me, as bellow.   Patient is stable for discharge today.   Patient to follow up with  Cardio   See discharge document for full note.  meds reviewed with pt   all questions answered                              ( note written / Date of service  10-19-21 )    ==================>> MEDICATIONS <<====================    aspirin enteric coated 81 milliGRAM(s) Oral daily  atorvastatin 40 milliGRAM(s) Oral at bedtime  finasteride 5 milliGRAM(s) Oral daily  levothyroxine 75 MICROGram(s) Oral daily  metoprolol succinate ER 50 milliGRAM(s) Oral daily  pantoprazole  Injectable 40 milliGRAM(s) IV Push every 12 hours  sodium chloride 0.9%. 1000 milliLiter(s) IV Continuous <Continuous>    MEDICATIONS  (PRN):  acetaminophen   Tablet .. 650 milliGRAM(s) Oral every 6 hours PRN Temp greater or equal to 38C (100.4F), Mild Pain (1 - 3)  melatonin 3 milliGRAM(s) Oral at bedtime PRN Insomnia    ___________  Active diet:  Diet, Regular:   DASH/TLC Sodium & Cholesterol Restricted (DASH)  ___________________    ==================>> VITAL SIGNS <<==================    T(C): 36.7 (10-19-21 @ 09:00), Max: 36.8 (10-19-21 @ 02:00)  HR: 66 (10-19-21 @ 09:00) (61 - 76)  BP: 162/82 (10-19-21 @ 09:00) (120/56 - 176/88)  RR: 18 (10-19-21 @ 09:00) (18 - 18)  SpO2: 99% (10-19-21 @ 09:00) (98% - 100%)     I&O's Summary    19 Oct 2021 07:01  -  19 Oct 2021 12:12  --------------------------------------------------------  IN: 240 mL / OUT: 0 mL / NET: 240 mL       ==================>> LAB AND IMAGING <<==================                        11.4   8.78  )-----------( 201      ( 19 Oct 2021 07:17 )             34.6        10-19    141  |  102  |  17  ----------------------------<  138<H>  4.1   |  23  |  0.95    Ca    9.0      19 Oct 2021 07:17  Phos  3.1     10-19  Mg     2.10     10-19    WBC count:   8.78 <<== ,  7.74 <<== ,  8.06 <<== ,  8.57 <<== ,  10.15 <<== ,  9.37 <<==   Hemoglobin:   11.4 <<==,  10.7 <<==,  11.3 <<==,  11.5 <<==,  12.1 <<==,  11.8 <<==  platelets:  201 <==, 177 <==, 178 <==, 200 <==, 219 <==, 190 <==, 188 <==    Creatinine:  0.95  <<==, 0.93  <<==, 1.05  <<==, 0.90  <<==, 0.94  <<==, 0.99  <<==  Sodium:   141  <==, 139  <==, 139  <==, 142  <==, 138  <==, 143  <==    facial xray report reviewed

## 2021-10-19 NOTE — PROVIDER CONTACT NOTE (OTHER) - ASSESSMENT
pt asymptomatic resting in bed. pt complaining of being bothered every 3 hours and does not want blood draws q6.
pt asymptomatic resting in bed comfortably. denies any pain sob or discomfort. pt states feeling a bit anxious
pt asymptomatic resting in bed comfortably. pt denies any pain sob or discomfort.
Patient BP elevated, /89mmHg

## 2021-10-19 NOTE — PROVIDER CONTACT NOTE (OTHER) - BACKGROUND
pt admitted for chf
Clint c/o rectal bleed
pt admitted for chest pain on 10/7/21
pt admitted for hemorrhage of rectum and anus

## 2021-10-19 NOTE — PROGRESS NOTE ADULT - PROVIDER SPECIALTY LIST ADULT
Cardiology
Internal Medicine
Surgery
Electrophysiology
Internal Medicine
Gastroenterology

## 2021-10-19 NOTE — PROGRESS NOTE ADULT - REASON FOR ADMISSION
rectal bleeding

## 2021-10-19 NOTE — DISCHARGE NOTE PROVIDER - CARE PROVIDER_API CALL
Segundo Preciado J  CARDIOVASCULAR DISEASE  [phone] [place] [id number]  Phone: [phone]  Fax: [phone]  Follow Up Time:

## 2021-10-19 NOTE — DISCHARGE NOTE NURSING/CASE MANAGEMENT/SOCIAL WORK - NSDCVIVACCINE_GEN_ALL_CORE_FT
Td (adult) preservative free; 16-Oct-2021 15:55; Sonja Mcginnis (RN); [employer]; [id number] (Exp. Date: 08-Oct-2022); IntraMuscular; Deltoid Right.; 0.5 milliLiter(s); VIS (VIS Published: 07-Oct-2022, VIS Presented: 16-Oct-2021);

## 2021-10-19 NOTE — DISCHARGE NOTE PROVIDER - NSDCCPCAREPLAN_GEN_ALL_CORE_FT
PRINCIPAL DISCHARGE DIAGNOSIS  Diagnosis: Rectal bleeding  Assessment and Plan of Treatment: Yo had a flex Sigmosidosopy which showed active Bleed from diverticular opening, No surgical Intervention was warranted  Please Stop Plavix  Your Blood Count is stable, Please Follow up with Your GI Doctor in 1-2 weeks      SECONDARY DISCHARGE DIAGNOSES  Diagnosis: CAD (coronary artery disease)  Assessment and Plan of Treatment: Continue aspirin  your plavix was stopped due to the rectal bleeding  Continue low salt, fat, cholesterol and carbohydrate diet. Follow up with cardiologist and primary care physician's routine appointment.      Diagnosis: Syncope  Assessment and Plan of Treatment: this was thought to be vasabagal in nature, No Plan for Loop Implant  Please follow up with your cardiologist in 1-2 weeks    Diagnosis: Facial laceration  Assessment and Plan of Treatment: YOu sistained a facial laceration after Your Fall/Syncope  You had 6 sutures placed, They are absorbable, No Need to Remove  Follow up withYour PMD in 1-2 weeks    Diagnosis: Rib fracture  Assessment and Plan of Treatment: YOu sustained a rib fracture after your Fall/Syncope. No Need for surgical Intervention  It is important that you take Deep breath  you can take  Pain Meds as needed for pain

## 2021-10-19 NOTE — PROVIDER CONTACT NOTE (OTHER) - ACTION/TREATMENT ORDERED:
acp made aware. metoprolol given will continue to monitor.
will draw 00 aptt and continue to encourage patient and educate patient for all future blood draws.
acp made aware will continue to monitor.
provider notified, await further orders

## 2021-10-20 ENCOUNTER — NON-APPOINTMENT (OUTPATIENT)
Age: 71
End: 2021-10-20

## 2021-11-01 ENCOUNTER — APPOINTMENT (OUTPATIENT)
Dept: GASTROENTEROLOGY | Facility: CLINIC | Age: 71
End: 2021-11-01
Payer: COMMERCIAL

## 2021-11-01 VITALS
DIASTOLIC BLOOD PRESSURE: 71 MMHG | HEIGHT: 67.5 IN | BODY MASS INDEX: 28.7 KG/M2 | SYSTOLIC BLOOD PRESSURE: 163 MMHG | HEART RATE: 68 BPM | WEIGHT: 185 LBS

## 2021-11-01 DIAGNOSIS — Z12.11 ENCOUNTER FOR SCREENING FOR MALIGNANT NEOPLASM OF COLON: ICD-10-CM

## 2021-11-01 PROCEDURE — 99215 OFFICE O/P EST HI 40 MIN: CPT

## 2021-11-01 RX ORDER — ASPIRIN 325 MG/1
325 TABLET, FILM COATED ORAL
Refills: 0 | Status: DISCONTINUED | COMMUNITY
End: 2021-11-01

## 2021-11-01 RX ORDER — ASPIRIN 81 MG
81 TABLET, DELAYED RELEASE (ENTERIC COATED) ORAL
Refills: 0 | Status: ACTIVE | COMMUNITY

## 2021-11-01 RX ORDER — LEVOTHYROXINE SODIUM 0.07 MG/1
75 TABLET ORAL
Qty: 90 | Refills: 0 | Status: ACTIVE | COMMUNITY
Start: 2021-10-02

## 2021-11-01 RX ORDER — PANTOPRAZOLE 40 MG/1
40 TABLET, DELAYED RELEASE ORAL
Qty: 90 | Refills: 0 | Status: ACTIVE | COMMUNITY
Start: 2021-10-28

## 2021-11-01 NOTE — CONSULT LETTER
[Dear  ___] : Dear  [unfilled], [Consult Letter:] : I had the pleasure of evaluating your patient, [unfilled]. [Please see my note below.] : Please see my note below. [Consult Closing:] : Thank you very much for allowing me to participate in the care of this patient.  If you have any questions, please do not hesitate to contact me. [Sincerely,] : Sincerely, [FreeTextEntry3] : Pepe Luke MD\par Department of Gastroenterology\par [facility]\par [address] [place] [address], [facility] [address]\[place] [address]\par Tel: [phone]\par Email: [email or web address]

## 2021-11-01 NOTE — HISTORY OF PRESENT ILLNESS
[Heartburn] : stable heartburn [Nausea] : resolved nausea [Vomiting] : denies vomiting [Diarrhea] : denies diarrhea [Constipation] : denies constipation [Yellow Skin Or Eyes (Jaundice)] : denies jaundice [Abdominal Pain] : denies abdominal pain [Abdominal Swelling] : denies abdominal swelling [Rectal Pain] : denies rectal pain [_________] : Performed [unfilled] [de-identified] : Stas presents to the office today for follow up after a recent hospitalization at [facility] for diverticular bleeding.\par \par The patient was admitted from October 14-19, 2021 for diverticular bleeding.  He presented with hematochezia in the setting of DAPT use and underwent a flexible sigmoidoscopy which showed active bleeding from a diverticulum in the descending colon.  5 clips were placed before the bleeding stopped.  The next day, the patient felt nauseous and had a syncopal episode which resulted in a laceration on his forehead and rib injury.  He was evaluated by cardiology and felt to have a vasovagal episode.  His Hb dropped from 14s to 10.7 during his admission.  Since the sigmoidoscopy, he has had no further bleeding.  He is taking aspirin but the Plavix has been held.  He denies any further nausea or abdominal pain.  He does take pantoprazole and had a prior EGD many years ago for eructation.  His symptoms are currently controlled with his diet.  He moves his bowels several times a day in the morning.  He denies any family history of colon cancer.  His last colonoscopy was in 2016 with Dr. Barger and he was advised to repeat it in 5 years.  The patient has a history of CAD status post 5 stents.  His last stent was placed in November 2020.  He has been taking DAPT for about 9 years.  This was his first diverticular bleeding episode.

## 2021-11-01 NOTE — REASON FOR VISIT
[Consultation] : a consultation visit [FreeTextEntry1] : Hospital follow up for diverticular bleeding

## 2021-11-01 NOTE — ASSESSMENT
[FreeTextEntry1] : 1.  Encounter for colon cancer screening in average risk patient.  Previous colonoscopy in 2016 (records unavailable).\par 2.  History of diverticular bleeding.  Flexible sigmoidoscopy in October 2021 with bleeding descending colon diverticulum status post clips.\par 3.  Heartburn, eructation, stable on PPI.  Prior EGD more than 5 years ago.\par 4.  CAD status post stents (last November 2020) on DAPT.\par 5.  HTN.\par 6.  HLD.\par 7.  Hypothyroidism.\par \par Recs:\par - Hospital records reviewed.\par - Physiology of diverticulosis and potential complications, including diverticulitis and bleeding were discussed.\par - If clinically indicated, patient was advised that he could resume clopidogrel.\par - Patient with episode of nausea and syncope one day after flexible sigmoidoscopy, suspect vasovagal in setting of prior bleeding and blood loss anemia.  Symptoms currently resolved.\par - Patient was advised to undergo colonoscopy and possible endoscopy in the hospital setting- procedure, risks, benefits, and alternatives were explained. Patient agreeable. Brochure given. Suprep.  If restarted, patient was advised to hold clopidogrel for 5 days prior to his procedures.

## 2021-11-02 ENCOUNTER — APPOINTMENT (OUTPATIENT)
Dept: ELECTROPHYSIOLOGY | Facility: CLINIC | Age: 71
End: 2021-11-02

## 2021-12-10 NOTE — ASU PATIENT PROFILE, ADULT - FALL HARM RISK - UNIVERSAL INTERVENTIONS
Bed in lowest position, wheels locked, appropriate side rails in place/Call bell, personal items and telephone in reach/Instruct patient to call for assistance before getting out of bed or chair/Non-slip footwear when patient is out of bed/[place] to call system/Physically safe environment - no spills, clutter or unnecessary equipment/Purposeful Proactive Rounding/Room/bathroom lighting operational, light cord in reach

## 2021-12-10 NOTE — ASU PATIENT PROFILE, ADULT - NSICDXPASTMEDICALHX_GEN_ALL_CORE_FT
PAST MEDICAL HISTORY:  Acute on chronic blood loss anemia     Benign prostatic hypertrophy     CAD (coronary artery disease)     Coronary artery disease     HTN (hypertension)     Myocardial infarction acute anterior wall 2009    Stented coronary artery endeavor x2 RCA 2009 - [facility]

## 2021-12-13 ENCOUNTER — RESULT REVIEW (OUTPATIENT)
Age: 71
End: 2021-12-13

## 2021-12-13 ENCOUNTER — APPOINTMENT (OUTPATIENT)
Dept: GASTROENTEROLOGY | Facility: HOSPITAL | Age: 71
End: 2021-12-13

## 2021-12-13 ENCOUNTER — OUTPATIENT (OUTPATIENT)
Dept: OUTPATIENT SERVICES | Facility: HOSPITAL | Age: 71
LOS: 1 days | Discharge: HOME | End: 2021-12-13
Payer: COMMERCIAL

## 2021-12-13 VITALS
RESPIRATION RATE: 16 BRPM | HEART RATE: 58 BPM | OXYGEN SATURATION: 97 % | SYSTOLIC BLOOD PRESSURE: 150 MMHG | DIASTOLIC BLOOD PRESSURE: 63 MMHG

## 2021-12-13 VITALS
OXYGEN SATURATION: 97 % | HEART RATE: 67 BPM | TEMPERATURE: 99 F | HEIGHT: 67 IN | WEIGHT: 186.95 LBS | SYSTOLIC BLOOD PRESSURE: 186 MMHG | RESPIRATION RATE: 18 BRPM | DIASTOLIC BLOOD PRESSURE: 73 MMHG

## 2021-12-13 DIAGNOSIS — R12 HEARTBURN: ICD-10-CM

## 2021-12-13 PROCEDURE — 45380 COLONOSCOPY AND BIOPSY: CPT | Mod: PT

## 2021-12-13 PROCEDURE — 88305 TISSUE EXAM BY PATHOLOGIST: CPT | Mod: 26

## 2021-12-13 PROCEDURE — 43239 EGD BIOPSY SINGLE/MULTIPLE: CPT | Mod: 59

## 2021-12-14 LAB — SURGICAL PATHOLOGY STUDY: SIGNIFICANT CHANGE UP

## 2022-02-10 ENCOUNTER — APPOINTMENT (OUTPATIENT)
Dept: OTOLARYNGOLOGY | Facility: CLINIC | Age: 72
End: 2022-02-10

## 2022-02-22 ENCOUNTER — NON-APPOINTMENT (OUTPATIENT)
Age: 72
End: 2022-02-22

## 2022-03-11 NOTE — H&P ADULT - NSHPLABSRESULTS_GEN_ALL_CORE
none
EKG: NSR 64 BPM TWI in AVR, V1, III, Flat T in AVF                          15.0   18.90 )-----------( 185      ( 15 Apr 2019 01:30 )             45.6     04-15    137  |  102  |  25<H>  ----------------------------<  178<H>  4.1   |  24  |  0.97    Ca    9.1      15 Apr 2019 01:30    TPro  7.2  /  Alb  4.2  /  TBili  0.6  /  DBili  x   /  AST  32  /  ALT  37  /  AlkPhos  80  04-15    Troponin T, High Sensitivity: 13: ---------------------***PLEASE NOTE***----------------------  Rapid changes upward or downward in high-sensitivity  troponin levels strongly suggest acute myocardial injury.  Hemolysis may falsely lower results. Renal impairment may  increase results.    Normal: <6 - 14 ng/L  Indeterminate: 15 - 51 ng/L  Elevated: >51 ng/L    Please see "[email or web address] on the [employer]  intranet for more information. ng/L (04.15.19 @ 04:00)

## 2022-04-18 NOTE — PROCEDURE NOTE - NSTIMEOUT_GEN_A_CORE
Patient's first and last name, , procedure, and correct site confirmed prior to the start of procedure.
4 = No assist / stand by assistance

## 2022-05-04 NOTE — RAPID RESPONSE TEAM SUMMARY - NSSITUATIONBACKGROUNDRRT_GEN_ALL_CORE
71M with PMHx CAD s/p stents (last 11/2020 to RCA on ASA/ Plavix), HTN, HLD, hypothyroidism, diverticulosis, BPH presenting with rectal bleeding x1 day. FOBT positive, w/ ongoing GI eval.    RRT called ON for syncopal episode c/b witnessed fall.  Pt endorsed feeling nauseous prior to episode. Per primary team who witnessed fall, pt hit his head on the ground. Upon being placed back in bed, was noted to have an episode of decreased consciousness; "staring" at nothing, also seemed "stiff" per primary team. Upon arrival at RRT, pt HD stable. FS WNL. AAOx4, without focal neurological deficits. Communicating in clear coherent sentences. Remained HD stable during RRT. EKG w. NSR.     Plan  -Obtain CBC, CMP, Mg/phos, trops, CKMB  -CT head  -Recommend EEG monitoring  -Tele monitoring  -Rest of care per primary team beer/wine

## 2022-05-08 ENCOUNTER — RX RENEWAL (OUTPATIENT)
Age: 72
End: 2022-05-08

## 2022-06-29 ENCOUNTER — APPOINTMENT (OUTPATIENT)
Dept: UROLOGY | Facility: CLINIC | Age: 72
End: 2022-06-29
Payer: COMMERCIAL

## 2022-06-29 DIAGNOSIS — R97.20 ELEVATED PROSTATE, SPECIFIC ANTIGEN [PSA]: ICD-10-CM

## 2022-06-29 PROCEDURE — 99214 OFFICE O/P EST MOD 30 MIN: CPT

## 2022-06-29 NOTE — HISTORY OF PRESENT ILLNESS
[FreeTextEntry1] : Now 71 yr old presents to follow up with BPH. Pt denies frequency/urgency, dysuria, or hematuria. Nocturia x 0-2, which has been consistent for years with stopping fluids before bedtime and no caffeine consumption. Pt denies pain in abd/flank. Recent U/a all wnl. Doing overall great.  Had recent diverticular bleed.\par \par No urinary issues or changes, now. No hematuria.\par \par On finasteride- ~ 8-9 years. \par \par PSA: \par 2.32-- 12/2021\par 2.67-- 5/15/21\par 2.10- 11/23/20\par 2.89- 4/2019\par 3.08- 4/2018\par 3.14- 4/2017\par 3.63 - 9/2016\par 3.79- 12/2015\par 4.38 6/2015\par 5.81 - 2/2015\par 4.76 - 12/2014\par 4.16 - 6/2013 [Urinary Urgency] : no urinary urgency [Urinary Frequency] : no urinary frequency [Nocturia] : nocturia [Dysuria] : no dysuria [Hematuria - Gross] : no gross hematuria [None] : None

## 2022-06-29 NOTE — LETTER BODY
[Dear  ___] : Dear  [unfilled], [Courtesy Letter:] : I had the pleasure of seeing your patient, [unfilled], in my office today. [Please see my note below.] : Please see my note below. [Consult Closing:] : Thank you very much for allowing me to participate in the care of this patient.  If you have any questions, please do not hesitate to contact me. [Sincerely,] : Sincerely, [FreeTextEntry1] : Now 71 yr old presents to follow up with BPH. Pt denies frequency/urgency, dysuria, or hematuria. Nocturia x 0-2, which has been consistent for years with stopping fluids before bedtime and no caffeine consumption. Pt denies pain in abd/flank. Recent U/a all wnl. Doing overall great.  Had recent diverticular bleed.\par \par No urinary issues or changes, now. No hematuria.\par \par On finasteride- ~ 8-9 years. \par \par PSA: \par 2.32-- 12/2021\par 2.67-- 5/15/21\par 2.10- 11/23/20\par 2.89- 4/2019\par 3.08- 4/2018\par 3.14- 4/2017\par 3.63 - 9/2016\par 3.79- 12/2015\par 4.38 6/2015\par 5.81 - 2/2015\par 4.76 - 12/2014\par 4.16 - 6/2013\par \par No pvr today; has been low and stable. PE unremarkable- no sig changes to prostate.\par \par Plan\par 1) PSA can be followed by Dr. Preciado- check in 6 months\par 2) cont finasteride\par 3) RTC 1 year with full exam\par \par will review labs by phone

## 2022-06-30 LAB
APPEARANCE: CLEAR
BACTERIA: NEGATIVE
BILIRUBIN URINE: NEGATIVE
BLOOD URINE: NEGATIVE
COLOR: YELLOW
GLUCOSE QUALITATIVE U: NEGATIVE
HYALINE CASTS: 0 /LPF
KETONES URINE: NEGATIVE
LEUKOCYTE ESTERASE URINE: NEGATIVE
MICROSCOPIC-UA: NORMAL
NITRITE URINE: NEGATIVE
PH URINE: 6
PROTEIN URINE: NORMAL
RED BLOOD CELLS URINE: 1 /HPF
SPECIFIC GRAVITY URINE: 1.03
SQUAMOUS EPITHELIAL CELLS: 0 /HPF
UROBILINOGEN URINE: NORMAL
WHITE BLOOD CELLS URINE: 0 /HPF

## 2022-08-02 NOTE — DISCHARGE NOTE ADULT - MEDICATION SUMMARY - MEDICATIONS TO CHANGE
"Chief Complaint   Patient presents with     Chronic Disease Management       Initial /70 (BP Location: Left arm, Patient Position: Sitting, Cuff Size: Adult Large)   Pulse 63   Temp 97  F (36.1  C) (Tympanic)   Resp 16   Wt 96.8 kg (213 lb 6.4 oz)   SpO2 96%   BMI 30.62 kg/m   Estimated body mass index is 30.62 kg/m  as calculated from the following:    Height as of 7/9/21: 1.778 m (5' 10\").    Weight as of this encounter: 96.8 kg (213 lb 6.4 oz).  Medication Reconciliation: complete  Lashell Waite LPN  "
I will SWITCH the dose or number of times a day I take the medications listed below when I get home from the hospital:  None

## 2022-08-24 ENCOUNTER — RESULT REVIEW (OUTPATIENT)
Age: 72
End: 2022-08-24

## 2022-09-27 ENCOUNTER — APPOINTMENT (OUTPATIENT)
Dept: GASTROENTEROLOGY | Facility: CLINIC | Age: 72
End: 2022-09-27

## 2022-09-27 ENCOUNTER — NON-APPOINTMENT (OUTPATIENT)
Age: 72
End: 2022-09-27

## 2022-09-27 VITALS
DIASTOLIC BLOOD PRESSURE: 71 MMHG | WEIGHT: 188 LBS | HEIGHT: 67 IN | SYSTOLIC BLOOD PRESSURE: 143 MMHG | BODY MASS INDEX: 29.51 KG/M2 | HEART RATE: 68 BPM

## 2022-09-27 DIAGNOSIS — E78.5 HYPERLIPIDEMIA, UNSPECIFIED: ICD-10-CM

## 2022-09-27 DIAGNOSIS — Z87.19 PERSONAL HISTORY OF OTHER DISEASES OF THE DIGESTIVE SYSTEM: ICD-10-CM

## 2022-09-27 DIAGNOSIS — R19.7 DIARRHEA, UNSPECIFIED: ICD-10-CM

## 2022-09-27 DIAGNOSIS — R10.9 UNSPECIFIED ABDOMINAL PAIN: ICD-10-CM

## 2022-09-27 DIAGNOSIS — E03.9 HYPOTHYROIDISM, UNSPECIFIED: ICD-10-CM

## 2022-09-27 PROCEDURE — 82274 ASSAY TEST FOR BLOOD FECAL: CPT | Mod: QW

## 2022-09-27 PROCEDURE — 99214 OFFICE O/P EST MOD 30 MIN: CPT

## 2022-09-27 NOTE — REASON FOR VISIT
[Consultation] : a consultation visit [FreeTextEntry1] : Pt has stomach cramps, upset stomach, 2 weeks ago had stomach bug, diarrhea

## 2022-09-27 NOTE — ASSESSMENT
[FreeTextEntry1] : Assessment:\par 1.  Diarrhea, nausea and abdominal cramps-resolved and then recurred-suspect gastroenteritis.\par 2. History of diverticular bleeding.  Colonoscopy December 13, 2021 revealed hyperplastic polyps, diffuse diverticulosis and internal hemorrhoids; flexible sigmoidoscopy in October 2021 with bleeding descending colon diverticulum status post clips.\par 3. Heartburn, eructation, previous PPI. Prior EGD more than 5 years ago.\par 4. CAD status post stents (last November 2020) on DAPT.\par 5. Hypertension\par 6. Hyperlipidemia\par 7. Hypothyroidism.\par 8. History of vasovagal episodes.\par \par Plan:\par 1.   Since the patient is feeling much better, will advise stool GI PCR testing and stool C. difficile testing only if diarrhea persists or recurs.\par 2.  Return as needed.\par

## 2022-09-27 NOTE — CONSULT LETTER
[Dear  ___] : Dear  [unfilled], [Consult Letter:] : I had the pleasure of evaluating your patient, [unfilled]. [( Thank you for referring [unfilled] for consultation for _____ )] : Thank you for referring [unfilled] for consultation for [unfilled] [Please see my note below.] : Please see my note below. [Consult Closing:] : Thank you very much for allowing me to participate in the care of this patient.  If you have any questions, please do not hesitate to contact me. [Sincerely,] : Sincerely, [FreeTextEntry3] : Tucker Angela MD

## 2022-09-27 NOTE — HISTORY OF PRESENT ILLNESS
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  He missed work the next day, but was able to go back to work 2 days later.  His daughter who was at pediatric critical care physician advised him to take Pepcid Complete, Gas-X and ondansetron, and this improves his symptoms quickly.  He also took some Imodium for the diarrhea.  He was back to normal a few days later and last week had a perfect week without any GI symptoms.  However, this past weekend, he started to feel similar symptoms, but not as severe.  He had some diarrhea, and took Imodium and Librax.  When he started retching, he had the urge to pass out.  He has had vasovagal episodes in the past, which has been evaluated by his cardiologist, Dr. Christie.  He recently had an echocardiogram, sono Doppler of the carotids and 5-day EKG monitor.  He was found to have a new left bundle branch block.  Recent blood tests were reported as normal.  He avoids coffee, alcohol and spicy foods.  Today he feels well without any significant GI symptoms.

## 2022-10-01 ENCOUNTER — NON-APPOINTMENT (OUTPATIENT)
Age: 72
End: 2022-10-01

## 2022-10-01 LAB
ENTEROPATHOGENIC E. COLI (EPEC): DETECTED
GI PCR PANEL: DETECTED

## 2022-10-05 LAB
C DIFF TOX B STL QL CT TISS CULT: NORMAL
Lab: NORMAL

## 2022-12-02 NOTE — H&P ADULT - NSHPSOURCEINFORD_GEN_ALL_CORE
Patient [FreeTextEntry1] : 66 y/o M s/p reconstruction of nasal Mohs defect with FTSG from right face Oct 2021 who requested revision secondary to mild notching on R nostril. Also c/o recurrence of R volar wrist ganglion cyst after removal with . \par \par Pt is now POD# 2 s/p recon of nasal mohs defect using local flap & excision of R volar wrist galnglion cyst on 11/30/22. \par \par -D/c nasal bolster dressing\par - Keep nasal septum sutures 1 week. Wound care with daily baci/neosporin with bandaid \par - Avoid blowing nose\par - R volar wrist incision: All bandages changed, steri strips placed. Hand re-wrapped\par - signs and symptoms of infection reviewed\par - No heavy lifting/pushing/pulling\par - No cardiovascular activity / inc heart rate\par - Tylenol pRn for any pain\par - All post op instructions reviewed, all questions answered\par - f/u *next week for nasal suture removal\par \par \par Due to COVID-19, pre-visit patient instructions were explained to the patient and their symptoms were checked upon arrival. Masks were used by the healthcare provider and staff and the examination room was cleaned after the patient visit concluded\par

## 2023-01-30 ENCOUNTER — RX RENEWAL (OUTPATIENT)
Age: 73
End: 2023-01-30

## 2023-04-05 ENCOUNTER — APPOINTMENT (OUTPATIENT)
Dept: OTOLARYNGOLOGY | Facility: CLINIC | Age: 73
End: 2023-04-05
Payer: COMMERCIAL

## 2023-04-05 VITALS
BODY MASS INDEX: 28.25 KG/M2 | HEIGHT: 67 IN | WEIGHT: 180 LBS | SYSTOLIC BLOOD PRESSURE: 177 MMHG | DIASTOLIC BLOOD PRESSURE: 80 MMHG | HEART RATE: 62 BPM

## 2023-04-05 DIAGNOSIS — I25.10 ATHEROSCLEROTIC HEART DISEASE OF NATIVE CORONARY ARTERY W/OUT ANGINA PECTORIS: ICD-10-CM

## 2023-04-05 DIAGNOSIS — I25.2 OLD MYOCARDIAL INFARCTION: ICD-10-CM

## 2023-04-05 DIAGNOSIS — J34.89 OTHER SPECIFIED DISORDERS OF NOSE AND NASAL SINUSES: ICD-10-CM

## 2023-04-05 DIAGNOSIS — R97.20 ELEVATED PROSTATE, SPECIFIC ANTIGEN [PSA]: ICD-10-CM

## 2023-04-05 DIAGNOSIS — R09.89 OTHER SPECIFIED SYMPTOMS AND SIGNS INVOLVING THE CIRCULATORY AND RESPIRATORY SYSTEMS: ICD-10-CM

## 2023-04-05 DIAGNOSIS — I10 ESSENTIAL (PRIMARY) HYPERTENSION: ICD-10-CM

## 2023-04-05 DIAGNOSIS — Z87.438 PERSONAL HISTORY OF OTHER DISEASES OF MALE GENITAL ORGANS: ICD-10-CM

## 2023-04-05 DIAGNOSIS — Z78.9 OTHER SPECIFIED HEALTH STATUS: ICD-10-CM

## 2023-04-05 DIAGNOSIS — Z82.49 FAMILY HISTORY OF ISCHEMIC HEART DISEASE AND OTHER DISEASES OF THE CIRCULATORY SYSTEM: ICD-10-CM

## 2023-04-05 DIAGNOSIS — J34.2 DEVIATED NASAL SEPTUM: ICD-10-CM

## 2023-04-05 PROCEDURE — 99204 OFFICE O/P NEW MOD 45 MIN: CPT | Mod: 25

## 2023-04-05 PROCEDURE — 31575 DIAGNOSTIC LARYNGOSCOPY: CPT

## 2023-04-05 RX ORDER — FLUTICASONE PROPIONATE 50 UG/1
50 SPRAY, METERED NASAL DAILY
Qty: 1 | Refills: 2 | Status: ACTIVE | COMMUNITY
Start: 2023-04-05 | End: 1900-01-01

## 2023-04-05 RX ORDER — SODIUM SULFATE, POTASSIUM SULFATE, MAGNESIUM SULFATE 17.5; 3.13; 1.6 G/ML; G/ML; G/ML
17.5-3.13-1.6 SOLUTION, CONCENTRATE ORAL
Qty: 1 | Refills: 0 | Status: COMPLETED | COMMUNITY
Start: 2021-11-01 | End: 2023-04-05

## 2023-04-05 RX ORDER — CETIRIZINE HCL 10 MG
TABLET ORAL
Refills: 0 | Status: ACTIVE | COMMUNITY

## 2023-04-05 RX ORDER — AZELASTINE HYDROCHLORIDE 137 UG/1
0.1 SPRAY, METERED NASAL
Refills: 0 | Status: ACTIVE | COMMUNITY

## 2023-04-05 RX ORDER — GUAIFENESIN 600 MG/1
600 TABLET, EXTENDED RELEASE ORAL
Qty: 120 | Refills: 4 | Status: ACTIVE | COMMUNITY
Start: 2023-04-05 | End: 1900-01-01

## 2023-04-05 NOTE — CONSULT LETTER
[Dear  ___] : Dear  [unfilled], [Courtesy Letter:] : I had the pleasure of seeing your patient, [unfilled], in my office today. [Please see my note below.] : Please see my note below. [Sincerely,] : Sincerely, [FreeTextEntry2] : Segundo Preciado [FreeTextEntry3] : Loco Armenta MD, LEXA, FACS\par  Department Otolaryngology\par Director of [facility]\par Professor of Otolaryngology, \par [facility] [facility]\par

## 2023-04-05 NOTE — REASON FOR VISIT
[Initial Evaluation] : an initial evaluation for [FreeTextEntry2] : former patient of Dr. Armenta, here for rhinorrhea, nasal congestion, throat phlegm

## 2023-04-05 NOTE — PHYSICAL EXAM
[] : septum deviated to the left [Midline] : trachea located in midline position [Normal] : no rashes [FreeTextEntry1] : rhinorrhea, nasal congestion, throat phlegm  [de-identified] : hypertrophy

## 2023-04-05 NOTE — PROCEDURE
[Image(s) Captured] : image(s) captured and filed [Video Captured] : video captured and filed [Unable to Cooperate with Mirror] : patient unable to cooperate with mirror [Complicated Symptoms] : complicated symptoms requiring more thorough examination than provided by mirror [Topical Lidocaine] : topical lidocaine [Oxymetazoline HCl] : oxymetazoline HCl [Flexible Endoscope] : examined with the flexible endoscope [Serial Number: ___] : Serial Number: [unfilled] [True Vocal Cords Paralysis] : no true vocal cord paralysis [True Vocal Cords Erythematous] : no true vocal cord edema [True Vocal Cords Elizalde's Nodules] : no true vocal cord nodules [Glottis Arytenoid Cartilages] : no arytenoid granulomas [Glottis Arytenoid Cartilages Erythema] : no arytenoid erythema [Normal] : posterior cricoid area had healthy pink mucosa in the interarytenoid area and the esophageal inlet [Present] : absent [Arytenoid Edema ___ /4] : bilaterally were normal [Arytenoid Erythema ___ /4] : bilaterally were normal [de-identified] : Patient was placed in the examination chair in a sitting position. The nose was decongested with oxymetazoline nasal solution. The airway was anesthetized with 4% Xylocaine.  The back of the throat was anesthetized with Cetacaine. Direct flexible/rigid video endoscopy was performed. Findings revealed: \par Pt has a L deviated septum, turbinate hypertrophy, narrowing of nasal airway, larynx epiglottis and vocal cords normal.

## 2023-04-05 NOTE — HISTORY OF PRESENT ILLNESS
[de-identified] : 72 year old male former patient of Dr. Armenta, here for rhinorrhea, nasal congestion, throat phlegm, throat clearing.  States the throat clearing is worse in the morning.  States has had symptoms for the past week.  States went to [facility] 4/3/22 and was prescribed Azelastine nasal spray.  States makes him feel congested and a little hyper so he stopped.  \par Denies having Covid.

## 2023-06-22 ENCOUNTER — APPOINTMENT (OUTPATIENT)
Dept: UROLOGY | Facility: CLINIC | Age: 73
End: 2023-06-22
Payer: COMMERCIAL

## 2023-06-22 VITALS — SYSTOLIC BLOOD PRESSURE: 208 MMHG | HEART RATE: 84 BPM | DIASTOLIC BLOOD PRESSURE: 92 MMHG

## 2023-06-22 VITALS — SYSTOLIC BLOOD PRESSURE: 193 MMHG | HEART RATE: 76 BPM | DIASTOLIC BLOOD PRESSURE: 80 MMHG

## 2023-06-22 DIAGNOSIS — N40.1 BENIGN PROSTATIC HYPERPLASIA WITH LOWER URINARY TRACT SYMPMS: ICD-10-CM

## 2023-06-22 DIAGNOSIS — R30.0 DYSURIA: ICD-10-CM

## 2023-06-22 DIAGNOSIS — R35.0 FREQUENCY OF MICTURITION: ICD-10-CM

## 2023-06-22 DIAGNOSIS — R39.15 URGENCY OF URINATION: ICD-10-CM

## 2023-06-22 PROCEDURE — 99214 OFFICE O/P EST MOD 30 MIN: CPT

## 2023-06-22 RX ORDER — PHENAZOPYRIDINE 200 MG/1
200 TABLET, FILM COATED ORAL 3 TIMES DAILY
Qty: 15 | Refills: 0 | Status: ACTIVE | COMMUNITY
Start: 2023-06-22 | End: 1900-01-01

## 2023-06-22 RX ORDER — ATORVASTATIN CALCIUM 40 MG/1
40 TABLET, FILM COATED ORAL
Refills: 0 | Status: ACTIVE | COMMUNITY

## 2023-06-22 NOTE — ASSESSMENT
[FreeTextEntry1] :  72  y.o male with first episode of Gross hematuria, urinary urgency ,frequency and pain with urination since today.\par Life style behavioral modifications reviewed - Fluids management - Drink 48 -64  oz of water as long as no medical contraindications, refrain from drinking fluids 3 hours before  bedtime.Address constipation  with fiber intake - fruits and vegetable servings 3-5 per day, use of daily Gummy fibers supplements if needed .\par \par  UA ,CX - call for results \par Empirical Bactrim DS 1 po q 12 h X 7 days - first dose given at 3 PM TODAY WITH CRACKERS.\par Pyridium 200  mg po  q 8 h PRN - 1 dose given at 3 pm \par CT Urogram \par RTO for cystoscopy with Dr Hoenig - do urine cytology at that visit  [Urinary Tract Infection (599.0\N39.0)] : qualitative ~C was positive

## 2023-06-22 NOTE — PHYSICAL EXAM
[General Appearance - Well Developed] : well developed [General Appearance - Well Nourished] : well nourished [Normal Appearance] : normal appearance [Well Groomed] : well groomed [General Appearance - In No Acute Distress] : no acute distress [Abdomen Soft] : soft [Abdomen Tenderness] : non-tender [Costovertebral Angle Tenderness] : no ~M costovertebral angle tenderness [Urethral Meatus] : meatus normal [Urinary Bladder Findings] : the bladder was normal on palpation [Scrotum] : the scrotum was normal [FreeTextEntry1] : bruising left forearm and left buttock  [Edema] : no peripheral edema [] : no respiratory distress [Respiration, Rhythm And Depth] : normal respiratory rhythm and effort [Exaggerated Use Of Accessory Muscles For Inspiration] : no accessory muscle use [Oriented To Time, Place, And Person] : oriented to person, place, and time [Affect] : the affect was normal [Mood] : the mood was normal [Not Anxious] : not anxious [Normal Station and Gait] : the gait and station were normal for the patient's age

## 2023-06-22 NOTE — HISTORY OF PRESENT ILLNESS
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 No FMH of  malignancy, never a smoker. \par \par PVR 0 ml

## 2023-06-26 ENCOUNTER — NON-APPOINTMENT (OUTPATIENT)
Age: 73
End: 2023-06-26

## 2023-06-26 LAB
APPEARANCE: ABNORMAL
BACTERIA UR CULT: ABNORMAL
BACTERIA: NEGATIVE /HPF
BILIRUBIN URINE: NORMAL
BLOOD URINE: NORMAL
COLOR: ABNORMAL
GLUCOSE QUALITATIVE U: NORMAL
HYALINE CASTS: NORMAL
KETONES URINE: NORMAL
LEUKOCYTE ESTERASE URINE: NORMAL
MICROSCOPIC-UA: NORMAL
NITRITE URINE: NORMAL
PH URINE: NORMAL
PROTEIN URINE: NORMAL
RED BLOOD CELLS URINE: ABNORMAL /HPF
SPECIFIC GRAVITY URINE: NORMAL
UROBILINOGEN URINE: NORMAL
WHITE BLOOD CELLS URINE: 15 /HPF

## 2023-06-28 ENCOUNTER — APPOINTMENT (OUTPATIENT)
Dept: UROLOGY | Facility: CLINIC | Age: 73
End: 2023-06-28

## 2023-06-30 ENCOUNTER — OUTPATIENT (OUTPATIENT)
Dept: OUTPATIENT SERVICES | Facility: HOSPITAL | Age: 73
LOS: 1 days | End: 2023-06-30
Payer: COMMERCIAL

## 2023-06-30 ENCOUNTER — APPOINTMENT (OUTPATIENT)
Dept: CT IMAGING | Facility: IMAGING CENTER | Age: 73
End: 2023-06-30
Payer: COMMERCIAL

## 2023-06-30 DIAGNOSIS — R30.0 DYSURIA: ICD-10-CM

## 2023-06-30 DIAGNOSIS — Z00.8 ENCOUNTER FOR OTHER GENERAL EXAMINATION: ICD-10-CM

## 2023-06-30 DIAGNOSIS — N40.1 BENIGN PROSTATIC HYPERPLASIA WITH LOWER URINARY TRACT SYMPTOMS: ICD-10-CM

## 2023-06-30 DIAGNOSIS — R31.0 GROSS HEMATURIA: ICD-10-CM

## 2023-06-30 PROCEDURE — 74178 CT ABD&PLV WO CNTR FLWD CNTR: CPT

## 2023-06-30 PROCEDURE — 74178 CT ABD&PLV WO CNTR FLWD CNTR: CPT | Mod: 26

## 2023-07-04 ENCOUNTER — TRANSCRIPTION ENCOUNTER (OUTPATIENT)
Age: 73
End: 2023-07-04

## 2023-07-11 LAB
APPEARANCE: ABNORMAL
BACTERIA: NEGATIVE /HPF
BILIRUBIN URINE: ABNORMAL
BLOOD URINE: ABNORMAL
COLOR: ABNORMAL
GLUCOSE QUALITATIVE U: ABNORMAL
HYALINE CASTS: NORMAL
KETONES URINE: NEGATIVE
LEUKOCYTE ESTERASE URINE: NEGATIVE
MICROSCOPIC-UA: NORMAL
NITRITE URINE: POSITIVE
PH URINE: 6.5
PROTEIN URINE: ABNORMAL
RED BLOOD CELLS URINE: 760 /HPF
SPECIFIC GRAVITY URINE: >=1.03
SQUAMOUS EPITHELIAL CELLS: NORMAL
UROBILINOGEN URINE: NORMAL
WHITE BLOOD CELLS URINE: 5 /HPF

## 2023-07-12 ENCOUNTER — APPOINTMENT (OUTPATIENT)
Dept: UROLOGY | Facility: CLINIC | Age: 73
End: 2023-07-12
Payer: COMMERCIAL

## 2023-07-12 DIAGNOSIS — R31.0 GROSS HEMATURIA: ICD-10-CM

## 2023-07-12 DIAGNOSIS — N39.0 URINARY TRACT INFECTION, SITE NOT SPECIFIED: ICD-10-CM

## 2023-07-12 PROCEDURE — 99214 OFFICE O/P EST MOD 30 MIN: CPT

## 2023-07-12 RX ORDER — SULFAMETHOXAZOLE AND TRIMETHOPRIM 800; 160 MG/1; MG/1
800-160 TABLET ORAL TWICE DAILY
Qty: 14 | Refills: 0 | Status: COMPLETED | COMMUNITY
Start: 2023-06-22 | End: 2023-07-12

## 2023-07-12 NOTE — HISTORY OF PRESENT ILLNESS
[FreeTextEntry1] : Pt seen today in f/u- gross hematuria, with dysuria.\par \par 73 yo male with h/o mild BPH.\par \par initial urine culture + e coli, treated with bactrim x 10 days, but had recurrence hematuria.\par started cipro after recurrence on 7/10/12-- urine culture + e coli, again, sensitivities pending\par (was sensitive to cipro on initial)\par \par CT urogram no stone, no hydro, no solid renal mass. No upper urinary collecting system mass.\par \par Bladder wall nonspecific thickening, enlarged prostate.\par \par Reviewed with pt.\par On finasteride ~ 10 years to this point\par PSA \par 2.81--- 2/2023\par 2.32-- 12/2021\par 2.67-- 5/15/21\par 2.10- 11/23/20\par 2.89- 4/2019\par 3.08- 4/2018\par 3.14- 4/2017\par 3.63 - 9/2016\par 3.79- 12/2015\par 4.38 6/2015\par 5.81 - 2/2015\par 4.76 - 12/2014\par 4.16 - 6/2013  [Urinary Urgency] : urinary urgency [Hematuria - Gross] : gross hematuria

## 2023-07-12 NOTE — ASSESSMENT
[FreeTextEntry1] : complete 2 weeks cipro, pending culture\par \par will defer cysto until after current uti, treatment, and hopefully resolution\par \par RTC ~ 4 weeks

## 2023-07-13 ENCOUNTER — NON-APPOINTMENT (OUTPATIENT)
Age: 73
End: 2023-07-13

## 2023-07-14 LAB — BACTERIA UR CULT: ABNORMAL

## 2023-08-22 ENCOUNTER — NON-APPOINTMENT (OUTPATIENT)
Age: 73
End: 2023-08-22

## 2023-08-22 DIAGNOSIS — N39.0 URINARY TRACT INFECTION, SITE NOT SPECIFIED: ICD-10-CM

## 2023-08-22 RX ORDER — CEFPODOXIME PROXETIL 100 MG/1
100 TABLET, FILM COATED ORAL TWICE DAILY
Qty: 20 | Refills: 0 | Status: ACTIVE | COMMUNITY
Start: 2023-08-22 | End: 1900-01-01

## 2023-08-22 RX ORDER — CIPROFLOXACIN HYDROCHLORIDE 500 MG/1
500 TABLET, FILM COATED ORAL
Qty: 14 | Refills: 0 | Status: COMPLETED | COMMUNITY
Start: 2023-07-10 | End: 2023-08-22

## 2023-08-23 ENCOUNTER — APPOINTMENT (OUTPATIENT)
Dept: UROLOGY | Facility: CLINIC | Age: 73
End: 2023-08-23

## 2023-09-19 ENCOUNTER — NON-APPOINTMENT (OUTPATIENT)
Age: 73
End: 2023-09-19

## 2023-09-20 ENCOUNTER — NON-APPOINTMENT (OUTPATIENT)
Age: 73
End: 2023-09-20

## 2023-10-02 ENCOUNTER — APPOINTMENT (OUTPATIENT)
Dept: ORTHOPEDIC SURGERY | Facility: CLINIC | Age: 73
End: 2023-10-02
Payer: COMMERCIAL

## 2023-10-02 VITALS — WEIGHT: 180 LBS | BODY MASS INDEX: 28.25 KG/M2 | HEIGHT: 67 IN

## 2023-10-02 DIAGNOSIS — M19.011 PRIMARY OSTEOARTHRITIS, RIGHT SHOULDER: ICD-10-CM

## 2023-10-02 PROCEDURE — 99203 OFFICE O/P NEW LOW 30 MIN: CPT

## 2023-10-02 PROCEDURE — 73030 X-RAY EXAM OF SHOULDER: CPT | Mod: RT

## 2023-10-12 ENCOUNTER — APPOINTMENT (OUTPATIENT)
Dept: MRI IMAGING | Facility: CLINIC | Age: 73
End: 2023-10-12
Payer: COMMERCIAL

## 2023-10-12 PROCEDURE — 73221 MRI JOINT UPR EXTREM W/O DYE: CPT | Mod: RT

## 2023-10-16 ENCOUNTER — APPOINTMENT (OUTPATIENT)
Dept: ORTHOPEDIC SURGERY | Facility: CLINIC | Age: 73
End: 2023-10-16
Payer: COMMERCIAL

## 2023-10-16 VITALS — HEIGHT: 67 IN | BODY MASS INDEX: 28.25 KG/M2 | WEIGHT: 180 LBS

## 2023-10-16 PROCEDURE — 73010 X-RAY EXAM OF SHOULDER BLADE: CPT | Mod: RT

## 2023-10-16 PROCEDURE — 99214 OFFICE O/P EST MOD 30 MIN: CPT

## 2023-10-16 RX ORDER — METHYLPREDNISOLONE 4 MG/1
4 TABLET ORAL
Qty: 1 | Refills: 0 | Status: ACTIVE | COMMUNITY
Start: 2023-10-16 | End: 1900-01-01

## 2023-11-20 ENCOUNTER — APPOINTMENT (OUTPATIENT)
Dept: ORTHOPEDIC SURGERY | Facility: CLINIC | Age: 73
End: 2023-11-20
Payer: COMMERCIAL

## 2023-11-20 VITALS — WEIGHT: 180 LBS | BODY MASS INDEX: 28.25 KG/M2 | HEIGHT: 67 IN

## 2023-11-20 DIAGNOSIS — S46.811D STRAIN OF OTHER MUSCLES, FASCIA AND TENDONS AT SHOULDER AND UPPER ARM LEVEL, RIGHT ARM, SUBSEQUENT ENCOUNTER: ICD-10-CM

## 2023-11-20 DIAGNOSIS — M75.21 BICIPITAL TENDINITIS, RIGHT SHOULDER: ICD-10-CM

## 2023-11-20 PROCEDURE — ZZZZZ: CPT

## 2024-01-15 NOTE — DISCHARGE NOTE ADULT - CARE PLAN
Clinician followed-up with parent to confirm rescheduling patient's onsite visit. Patient is scheduled for an evaluation and cannot be seen for individual therapy same-day. Parent confirmed a virtual visit for Wednesday (1/17) at 5pm. Clinician will email a Zoom link for the single visit; ongoing visits will be in-person, Tuesdays at 3pm starting 1/23.   Principal Discharge DX:	Stented coronary artery  Goal:	s/p resolute sonia stent dRCA  Instructions for follow-up, activity and diet:	Follow up with Dr Preciado in 1week  continue dual antiplatelet therapy with ASA and Plavix therapy for a minimum of 1 year  low salt, low fat, low cholesterol  Secondary Diagnosis:	HTN (hypertension)  Instructions for follow-up, activity and diet:	continue prescribed medications  low salt, low fat, low cholesterol

## 2024-01-19 ENCOUNTER — APPOINTMENT (OUTPATIENT)
Dept: ORTHOPEDIC SURGERY | Facility: CLINIC | Age: 74
End: 2024-01-19

## 2024-01-19 ENCOUNTER — NON-APPOINTMENT (OUTPATIENT)
Age: 74
End: 2024-01-19

## 2024-01-22 ENCOUNTER — APPOINTMENT (OUTPATIENT)
Dept: ORTHOPEDIC SURGERY | Facility: CLINIC | Age: 74
End: 2024-01-22
Payer: COMMERCIAL

## 2024-01-22 VITALS — WEIGHT: 180 LBS | HEIGHT: 67 IN | BODY MASS INDEX: 28.25 KG/M2

## 2024-01-22 DIAGNOSIS — S46.011D STRAIN OF MUSCLE(S) AND TENDON(S) OF THE ROTATOR CUFF OF RIGHT SHOULDER, SUBSEQUENT ENCOUNTER: ICD-10-CM

## 2024-01-22 PROCEDURE — 99213 OFFICE O/P EST LOW 20 MIN: CPT

## 2024-01-22 NOTE — HISTORY OF PRESENT ILLNESS
[6] : 6 [5] : 5 [Dull/Aching] : dull/aching [Nothing helps with pain getting better] : Nothing helps with pain getting better [de-identified] : Here to follow up on right shoulder.  [] : no [FreeTextEntry1] : R shoulder [de-identified] : activity

## 2024-01-22 NOTE — REASON FOR VISIT
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  There was a fall with a loading injury.  No prior history.  He saw Dr. Tinoco on 10/2, and was sent for an MRI.  No numbness.  The MDP has helped a great deal.  He is making nice gains with strengthening.  Sleeping is OK, though he has had to limit the amount of time on his right side.  With PT and a HEP, he feels 80% better.

## 2024-01-22 NOTE — PHYSICAL EXAM
[Right] : right shoulder [4 ___] : forward flexion 4[unfilled]/5 [Left] : left shoulder [4___] : external rotation 4[unfilled]/5 [Sitting] : sitting [Minimal] : minimal [] : no sensory deficits [de-identified] : Bellypress is weak. Liftoff is positive. [FreeTextEntry9] : /60/T10. [TWNoteComboBox4] : passive forward flexion 140 degrees [TWNoteComboBox6] : internal rotation L1 [de-identified] : external rotation 50 degrees

## 2024-04-10 ENCOUNTER — APPOINTMENT (OUTPATIENT)
Dept: GASTROENTEROLOGY | Facility: CLINIC | Age: 74
End: 2024-04-10

## 2024-04-22 ENCOUNTER — APPOINTMENT (OUTPATIENT)
Dept: ORTHOPEDIC SURGERY | Facility: CLINIC | Age: 74
End: 2024-04-22

## 2024-05-30 ENCOUNTER — TRANSCRIPTION ENCOUNTER (OUTPATIENT)
Age: 74
End: 2024-05-30

## 2024-05-30 ENCOUNTER — INPATIENT (INPATIENT)
Facility: HOSPITAL | Age: 74
LOS: 0 days | Discharge: HOME | End: 2024-05-30
Attending: GENERAL PRACTICE | Admitting: GENERAL PRACTICE
Payer: COMMERCIAL

## 2024-05-30 VITALS
SYSTOLIC BLOOD PRESSURE: 208 MMHG | RESPIRATION RATE: 18 BRPM | OXYGEN SATURATION: 99 % | DIASTOLIC BLOOD PRESSURE: 89 MMHG | TEMPERATURE: 98 F | HEART RATE: 66 BPM

## 2024-05-30 VITALS
RESPIRATION RATE: 16 BRPM | HEART RATE: 72 BPM | OXYGEN SATURATION: 97 % | SYSTOLIC BLOOD PRESSURE: 145 MMHG | DIASTOLIC BLOOD PRESSURE: 62 MMHG

## 2024-05-30 DIAGNOSIS — R07.89 OTHER CHEST PAIN: ICD-10-CM

## 2024-05-30 LAB
ALBUMIN SERPL ELPH-MCNC: 4.3 G/DL — SIGNIFICANT CHANGE UP (ref 3.3–5)
ALP SERPL-CCNC: 95 U/L — SIGNIFICANT CHANGE UP (ref 40–120)
ALT FLD-CCNC: 38 U/L — SIGNIFICANT CHANGE UP (ref 4–41)
ANION GAP SERPL CALC-SCNC: 15 MMOL/L — HIGH (ref 7–14)
APTT BLD: 26.6 SEC — SIGNIFICANT CHANGE UP (ref 24.5–35.6)
AST SERPL-CCNC: 30 U/L — SIGNIFICANT CHANGE UP (ref 4–40)
BASOPHILS # BLD AUTO: 0.01 K/UL — SIGNIFICANT CHANGE UP (ref 0–0.2)
BASOPHILS NFR BLD AUTO: 0.1 % — SIGNIFICANT CHANGE UP (ref 0–2)
BILIRUB SERPL-MCNC: 0.7 MG/DL — SIGNIFICANT CHANGE UP (ref 0.2–1.2)
BLD GP AB SCN SERPL QL: NEGATIVE — SIGNIFICANT CHANGE UP
BUN SERPL-MCNC: 19 MG/DL — SIGNIFICANT CHANGE UP (ref 7–23)
CALCIUM SERPL-MCNC: 9.6 MG/DL — SIGNIFICANT CHANGE UP (ref 8.4–10.5)
CHLORIDE SERPL-SCNC: 104 MMOL/L — SIGNIFICANT CHANGE UP (ref 98–107)
CK MB CFR SERPL CALC: 4.1 NG/ML — SIGNIFICANT CHANGE UP
CO2 SERPL-SCNC: 23 MMOL/L — SIGNIFICANT CHANGE UP (ref 22–31)
CREAT SERPL-MCNC: 0.91 MG/DL — SIGNIFICANT CHANGE UP (ref 0.5–1.3)
EGFR: 89 ML/MIN/1.73M2 — SIGNIFICANT CHANGE UP
EOSINOPHIL # BLD AUTO: 0 K/UL — SIGNIFICANT CHANGE UP (ref 0–0.5)
EOSINOPHIL NFR BLD AUTO: 0 % — SIGNIFICANT CHANGE UP (ref 0–6)
GLUCOSE SERPL-MCNC: 176 MG/DL — HIGH (ref 70–99)
HCT VFR BLD CALC: 43.9 % — SIGNIFICANT CHANGE UP (ref 39–50)
HGB BLD-MCNC: 15 G/DL — SIGNIFICANT CHANGE UP (ref 13–17)
IANC: 10.89 K/UL — HIGH (ref 1.8–7.4)
IMM GRANULOCYTES NFR BLD AUTO: 0.5 % — SIGNIFICANT CHANGE UP (ref 0–0.9)
INR BLD: 0.99 RATIO — SIGNIFICANT CHANGE UP (ref 0.85–1.18)
LYMPHOCYTES # BLD AUTO: 1.23 K/UL — SIGNIFICANT CHANGE UP (ref 1–3.3)
LYMPHOCYTES # BLD AUTO: 9.2 % — LOW (ref 13–44)
MCHC RBC-ENTMCNC: 27.4 PG — SIGNIFICANT CHANGE UP (ref 27–34)
MCHC RBC-ENTMCNC: 34.2 GM/DL — SIGNIFICANT CHANGE UP (ref 32–36)
MCV RBC AUTO: 80.1 FL — SIGNIFICANT CHANGE UP (ref 80–100)
MONOCYTES # BLD AUTO: 1.11 K/UL — HIGH (ref 0–0.9)
MONOCYTES NFR BLD AUTO: 8.3 % — SIGNIFICANT CHANGE UP (ref 2–14)
NEUTROPHILS # BLD AUTO: 10.89 K/UL — HIGH (ref 1.8–7.4)
NEUTROPHILS NFR BLD AUTO: 81.9 % — HIGH (ref 43–77)
NRBC # BLD: 0 /100 WBCS — SIGNIFICANT CHANGE UP (ref 0–0)
NRBC # FLD: 0 K/UL — SIGNIFICANT CHANGE UP (ref 0–0)
NT-PROBNP SERPL-SCNC: 1469 PG/ML — HIGH
PLATELET # BLD AUTO: 222 K/UL — SIGNIFICANT CHANGE UP (ref 150–400)
POTASSIUM SERPL-MCNC: 4.6 MMOL/L — SIGNIFICANT CHANGE UP (ref 3.5–5.3)
POTASSIUM SERPL-SCNC: 4.6 MMOL/L — SIGNIFICANT CHANGE UP (ref 3.5–5.3)
PROT SERPL-MCNC: 7.7 G/DL — SIGNIFICANT CHANGE UP (ref 6–8.3)
PROTHROM AB SERPL-ACNC: 11.2 SEC — SIGNIFICANT CHANGE UP (ref 9.5–13)
RBC # BLD: 5.48 M/UL — SIGNIFICANT CHANGE UP (ref 4.2–5.8)
RBC # FLD: 13.2 % — SIGNIFICANT CHANGE UP (ref 10.3–14.5)
RH IG SCN BLD-IMP: NEGATIVE — SIGNIFICANT CHANGE UP
SODIUM SERPL-SCNC: 142 MMOL/L — SIGNIFICANT CHANGE UP (ref 135–145)
TROPONIN T, HIGH SENSITIVITY RESULT: 25 NG/L — SIGNIFICANT CHANGE UP
WBC # BLD: 13.3 K/UL — HIGH (ref 3.8–10.5)
WBC # FLD AUTO: 13.3 K/UL — HIGH (ref 3.8–10.5)

## 2024-05-30 PROCEDURE — 93454 CORONARY ARTERY ANGIO S&I: CPT | Mod: 26

## 2024-05-30 PROCEDURE — 99285 EMERGENCY DEPT VISIT HI MDM: CPT

## 2024-05-30 PROCEDURE — 99152 MOD SED SAME PHYS/QHP 5/>YRS: CPT

## 2024-05-30 PROCEDURE — 71046 X-RAY EXAM CHEST 2 VIEWS: CPT | Mod: 26

## 2024-05-30 RX ORDER — ASPIRIN/CALCIUM CARB/MAGNESIUM 324 MG
1 TABLET ORAL
Qty: 0 | Refills: 0 | DISCHARGE

## 2024-05-30 RX ORDER — ATORVASTATIN CALCIUM 80 MG/1
1 TABLET, FILM COATED ORAL
Refills: 0 | DISCHARGE

## 2024-05-30 RX ORDER — SODIUM CHLORIDE 9 MG/ML
500 INJECTION INTRAMUSCULAR; INTRAVENOUS; SUBCUTANEOUS
Refills: 0 | Status: DISCONTINUED | OUTPATIENT
Start: 2024-05-30 | End: 2024-05-30

## 2024-05-30 RX ORDER — FINASTERIDE 5 MG/1
1 TABLET, FILM COATED ORAL
Qty: 0 | Refills: 0 | DISCHARGE

## 2024-05-30 RX ORDER — OMEGA-3 ACID ETHYL ESTERS 1 G
2 CAPSULE ORAL
Qty: 0 | Refills: 0 | DISCHARGE

## 2024-05-30 RX ORDER — SODIUM CHLORIDE 9 MG/ML
250 INJECTION INTRAMUSCULAR; INTRAVENOUS; SUBCUTANEOUS ONCE
Refills: 0 | Status: COMPLETED | OUTPATIENT
Start: 2024-05-30 | End: 2024-05-30

## 2024-05-30 RX ORDER — LABETALOL HCL 100 MG
100 TABLET ORAL ONCE
Refills: 0 | Status: COMPLETED | OUTPATIENT
Start: 2024-05-30 | End: 2024-05-30

## 2024-05-30 RX ORDER — FAMOTIDINE 10 MG/ML
20 INJECTION INTRAVENOUS ONCE
Refills: 0 | Status: DISCONTINUED | OUTPATIENT
Start: 2024-05-30 | End: 2024-05-30

## 2024-05-30 RX ORDER — LABETALOL HCL 100 MG
10 TABLET ORAL ONCE
Refills: 0 | Status: COMPLETED | OUTPATIENT
Start: 2024-05-30 | End: 2024-05-30

## 2024-05-30 RX ORDER — LOSARTAN POTASSIUM 100 MG/1
25 TABLET, FILM COATED ORAL ONCE
Refills: 0 | Status: COMPLETED | OUTPATIENT
Start: 2024-05-30 | End: 2024-05-30

## 2024-05-30 RX ORDER — LEVOTHYROXINE SODIUM 125 MCG
1 TABLET ORAL
Qty: 0 | Refills: 0 | DISCHARGE

## 2024-05-30 RX ORDER — LOSARTAN POTASSIUM 100 MG/1
1 TABLET, FILM COATED ORAL
Qty: 1 | Refills: 0
Start: 2024-05-30 | End: 2024-06-08

## 2024-05-30 RX ORDER — NITROGLYCERIN 6.5 MG
0.4 CAPSULE, EXTENDED RELEASE ORAL ONCE
Refills: 0 | Status: DISCONTINUED | OUTPATIENT
Start: 2024-05-30 | End: 2024-05-30

## 2024-05-30 RX ADMIN — Medication 10 MILLIGRAM(S): at 16:10

## 2024-05-30 RX ADMIN — SODIUM CHLORIDE 75 MILLILITER(S): 9 INJECTION INTRAMUSCULAR; INTRAVENOUS; SUBCUTANEOUS at 18:05

## 2024-05-30 RX ADMIN — SODIUM CHLORIDE 1000 MILLILITER(S): 9 INJECTION INTRAMUSCULAR; INTRAVENOUS; SUBCUTANEOUS at 16:07

## 2024-05-30 RX ADMIN — LOSARTAN POTASSIUM 25 MILLIGRAM(S): 100 TABLET, FILM COATED ORAL at 18:03

## 2024-05-30 RX ADMIN — Medication 100 MILLIGRAM(S): at 16:06

## 2024-05-30 RX ADMIN — SODIUM CHLORIDE 75 MILLILITER(S): 9 INJECTION INTRAMUSCULAR; INTRAVENOUS; SUBCUTANEOUS at 16:07

## 2024-05-30 NOTE — ED ADULT TRIAGE NOTE - CHIEF COMPLAINT QUOTE
Pt arrives via EMS from work c/o left-sided CP radiating to the shoulder that self-resolved prior to EMS arrival. Took 324 mg ASA PTA to E.D. RR even and unlabored. Pt appears flushed. Currently taking steroids for laryngitis. PMHx: HTN, CAD, x5 cardiac stents.

## 2024-05-30 NOTE — ED PROVIDER NOTE - PHYSICAL EXAMINATION
Gen: AAOx3, non-toxic  Head: NCAT  HEENT: EOMI, oral mucosa moist, normal conjunctiva  Lung: CTAB, no respiratory distress, no wheezes/rhonchi/rales B/L,   CV: RRR, no murmurs, rubs or gallops  Abd: soft, NTND, no guarding, no CVA tenderness  MSK: no visible deformities  Neuro: No focal sensory or motor deficits  Skin: Warm, well perfused, no rash  Psych: normal affect.

## 2024-05-30 NOTE — DISCHARGE NOTE PROVIDER - CARE PROVIDER_API CALL
Segundo Preciado  Cardiovascular Disease  [address] [place] [id number]  Phone: [phone]  Fax: [phone]  Follow Up Time:

## 2024-05-30 NOTE — H&P CARDIOLOGY - HISTORY OF PRESENT ILLNESS
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    In light of patients cardiac risk factors, symptoms and abnormal noninvasive test findings there is high suspicion for CAD. Patient is now referred to [facility] for a cardiac catheterization with possible PTCA/stent.     Referring MD: Ilana     Patient states took 81 of aspirin, also noticed intermittent flushing of face, called EMS was found to be hypertensive, was given additional 3 ask 81 aspirin for total of 324 ASA.  Patient called cardiologist Dr. Segundo Preciado, was told to go to the ED for possible catheterization.  Spoke to Dr. Todd's interventional cardiology and was scheduled for Cath Lab procedure.  Patient now complaining of intermittent chest discomfort.. 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 Admits to sweeping his home this morning without complaints.  Denies recent change in exercise tolearance, SOB, dizziness, syncope and increase in fatigue.    He does explain that he has been suffering from severe post nasal drip and laryngitis for the past 1 week, afebrile,   In light of patients cardiac risk factors, symptoms and abnormal noninvasive test findings there is high suspicion for CAD. Patient is now referred to [facility] for a cardiac catheterization with possible PTCA/stent.     Referring MD: Ilana     Patient states took 81 of aspirin, also noticed intermittent flushing of face, called EMS was found to be hypertensive, was given additional 3 ask 81 aspirin for total of 324 ASA.  Patient called cardiologist Dr. Segundo Preciado, was told to go to the ED for possible catheterization.  Spoke to Dr. Todd's interventional cardiology and was scheduled for Cath Lab procedure.  Patient now complaining of intermittent chest discomfort.. 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 Upon arrival to [facility] patient with /79, treated with Labetalol 10mg IV x1 and Labetalol 100mg po x1.   Admits to sweeping his home this morning without complaints.  Denies recent change in exercise tolerance SOB, dizziness, syncope and increase in fatigue.    He does explain that he has been suffering from severe post nasal drip and laryngitis for the past 1 week, afebrile for which he presented to an urgi-center last night where he wasstarted on an Augmentin course and given an injection of Decadron in his left shoulder, which is the same shoulder that was hurting this morning.    Most recent cardiac work up last month with reported normal results.   In light of patients CAD history, symptoms there the patient is now referred to [facility] for a cardiac catheterization to evaluate for ischemia with possible PTCA/stent versus viral upper respiratory infection with steroid induced hypertensive emergency.   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 Patients states when he got in the ambulance he did not have any more arm symptoms and nothing since. Admits to history of GERD for which he feels is stable with no recent changes.    Upon arrival to [facility] patient with hypertensive urgency with /79, asymptomatic, treated with Labetalol 10mg IV x1 and Labetalol 100mg po x1.   Admits to sweeping his home this morning without complaints.  Denies recent change in exercise tolerance SOB, dizziness, syncope and increase in fatigue.    He does explain that he has been suffering from severe post nasal drip and laryngitis for the past 1 week, afebrile for which he presented to an urgi-center last night where he wasstarted on an Augmentin course and given an injection of Decadron in his left shoulder, which is the same shoulder that was hurting this morning.    Most recent cardiac work up last month with reported normal results.   In light of patients CAD history, symptoms there the patient is now referred to [facility] for a cardiac catheterization to evaluate for ischemia with possible PTCA/stent versus viral upper respiratory infection with steroid induced hypertensive emergency.   Discussed with Dr Engel who will given additional antihypertensive medications in case as needed     Referring MD: Ilana

## 2024-05-30 NOTE — ED ADULT NURSE NOTE - NS PRO PASSIVE SMOKE EXP
Kevin Jaramillo is a 88 year old male presents to [facility] today for Prolia injection    Diagnosis: osteoporosis     Regimen: Prolia every 6 months     Dr. Alan Oviedo  is supervising provider today.      Patient reports feeling well today        Pre-Treatment: - Patient has valid pre-authorization  - VS completed  - Lab results checked -   - Patient is identified by first & last name, Date of birth that has been verified with the patient chairside.       Treatment: Refer to MAR for  medication administration   Injection:    Pre-Injection Assessment: Pt states feeling well, no complaints.  Type of Injection: Subcutaneous  Drug/Substance Injected: Polia  Needle Size: manufacture           Patient Discharged: Pt discharged to home per self, ambulatory, with family member        
No

## 2024-05-30 NOTE — H&P CARDIOLOGY - PATIENT'S GENDER IDENTITY
JERI-7 score is 12 which correlates with moderate anxiety.  We will start Zoloft 25 mg daily.   Male

## 2024-05-30 NOTE — DISCHARGE NOTE PROVIDER - NSDCCPCAREPLAN_GEN_ALL_CORE_FT
PRINCIPAL DISCHARGE DIAGNOSIS  Diagnosis: Hypertensive urgency  Assessment and Plan of Treatment: steroid induced  continue home medications  Losartan 25mg po daily bedtime as needed for SBP greater than 150

## 2024-05-30 NOTE — DISCHARGE NOTE NURSING/CASE MANAGEMENT/SOCIAL WORK - NSDCVIVACCINE_GEN_ALL_CORE_FT
Td (adult) preservative free; 16-Oct-2021 15:55; Sonja Mcginnis (RN); [employer]; [id number] (Exp. Date: 08-Oct-2022); IntraMuscular; Deltoid Right.; 0.5 milliLiter(s); VIS (VIS Published: 07-Oct-2022, VIS Presented: 16-Oct-2021);

## 2024-05-30 NOTE — DISCHARGE NOTE PROVIDER - NSDCFUADDAPPT_GEN_ALL_CORE_FT
Follow up with Dr VIKTORIA Preciado on 5/31 for further blood pressure monitor and evaluation    continue home medications tonight, DO NOT TAKE Metoprolol this evening  START Metoprolol XL 25mg po daily in the morning on 5/31    If further need for BP control and if SBP greater than 150 and Dr Preciado has agreed, please start Losartan 25mg po daily bedtime on the evening of 5/31 for the next 3-5days according to blood pressure readings

## 2024-05-30 NOTE — ASU PATIENT PROFILE, ADULT - NSICDXPASTMEDICALHX_GEN_ALL_CORE_FT
PAST MEDICAL HISTORY:  Acute on chronic blood loss anemia     Benign prostatic hypertrophy     CAD (coronary artery disease)     Coronary artery disease     HTN (hypertension)     Myocardial infarction acute anterior wall 2009    Stented coronary artery endeavor x2 RCA 2009 - [facility]

## 2024-05-30 NOTE — ED PROVIDER NOTE - CLINICAL SUMMARY MEDICAL DECISION MAKING FREE TEXT BOX
73-year-old male past medical history of CAD status post 5 stents, most recently in 2020, on aspirin and Plavix, hypertension, hyperlipidemia, hypothyroidism presents ED complaining of intermittent left-sided chest discomfort with associated left arm numbness and tingling.  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  Patient states took 81 of aspirin, also noticed intermittent flushing of face, called EMS was found to be hypertensive, was given additional 3 ask 81 aspirin for total of 324 ASA.  Patient called cardiologist Dr. Segundo Preciado, was told to go to the ED for possible catheterization.  Spoke to Dr. Todd's interventional cardiology and was scheduled for Cath Lab procedure.  Patient now complaining of intermittent chest discomfort.    VSS. Clinically stable. EKG w left bundle branch block w diffuse ST elevations, will monitor w scarbossa criteria. PE, well appearing, no acute distress, AAOx3. NCAT, EOMI, normal conjunctiva, mucous membranes moist, LCTAB no w/r/c, no MRG, RRR, abd NDNT, no rebound tenderness or guarding, no CVA ttp, no focal neuro deficits, neurovascularly intact, dp 2+, no bruising, rashes, or erythema. Suspicion for acs vs gerd vs msk related pain. TBA to cath lab for catheterization to Dr. Engel

## 2024-05-30 NOTE — ED PROVIDER NOTE - ATTENDING CONTRIBUTION TO CARE
Attending note:   After face to face evaluation of this patient, I concur with above noted hx, pe, and care plan for this patient.  Gibson: 73-year-old male with past medical history by coronary disease with 5 stents, hypertension, hyperlipidemia.  Patient has as needed nitro but has not taken it.  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  Pain was on left side rating to left shoulder.  Patient now states pain is resolved with just some discomfort remained.  After burping 3 times discomfort is also resolved.  Patient denies any shortness of breath.  Patient has been taking Decadron and Flonase for laryngitis.  Patient is significantly hypertensive in triage.  Lungs are clear and heart is regular rate and rhythm.  There is no pitting edema no JVD noted.  There is no tenderness to chest wall and abdomen soft nontender.  EKG is left bundle branch block which is similar to previous.  Case was discussed with Dr. Engel who is the patient's cardiologist.  They will be cathing him shortly.  Patient to be admitted to the hospitalist.  Will treat blood pressure as needed but hold off any anticoagulation as patient is already taken aspirin.

## 2024-05-30 NOTE — H&P CARDIOLOGY - OTHER
** post DC addendum**  patient admitted to my service via ER for patient to go to cath lab, patient was managed by interventional cardiology and cath lab team and transferred to CCU and this morning discharged by the CCU and interventional cardiology teams...   patient was not seen or examined by me throughout this admission.  Dr. SUSHANT Roche ( 5/31/2024 10AM )  ** end addendum**

## 2024-05-30 NOTE — H&P CARDIOLOGY - COMMENTS
IVF hydration pre cath     Pre Sedation Evaluation Outpatient    Dentures: None  Last PO intake:  Obstructive sleep apnea: No  Aspiration risk: No  Mallampati score: 2  ASA Classification: 2  Prior Sedative or Anesthesia Experience: No complications  Informed consent by responsible adult: Yes  Responsible adult escort: Yes  Based on today's assessment, anesthesia consult requested: No IVF hydration pre cath     Pre Sedation Evaluation Outpatient    Dentures: none  Last PO intake: 5/30 at 0930  Obstructive sleep apnea: No  Aspiration risk: No  Mallampati score: 2  ASA Classification: 2  Prior Sedative or Anesthesia Experience: No complications  Informed consent by responsible adult: Yes  Responsible adult escort: Yes  Based on today's assessment, anesthesia consult requested: No

## 2024-05-30 NOTE — DISCHARGE NOTE PROVIDER - HOSPITAL COURSE
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 Patients states when he got in the ambulance he did not have any more arm symptoms and nothing since. Admits to history of GERD for which he feels is stable with no recent changes.    Upon arrival to [facility] patient with hypertensive urgency with /79, asymptomatic, treated with Labetalol 10mg IV x1 and Labetalol 100mg po x1.   Admits to sweeping his home this morning without complaints.  Denies recent change in exercise tolerance SOB, dizziness, syncope and increase in fatigue.    He does explain that he has been suffering from severe post nasal drip and laryngitis for the past 1 week, afebrile for which he presented to an urgi-center last night where he wasstarted on an Augmentin course and given an injection of Decadron in his left shoulder, which is the same shoulder that was hurting this morning.    Most recent cardiac work up last month with reported normal results.   In light of patients CAD history, symptoms there the patient is now referred to [facility] for a cardiac catheterization to evaluate for ischemia with possible PTCA/stent versus viral upper respiratory infection with steroid induced hypertensive emergency.   Discussed with Dr Engel who will given additional antihypertensive medications in case as needed   Underwent a cardiac catheterization via RRA access revealing patent stents and non-obstructive CAD.   Patient BP improved 160/72, discussed with Dr Engel, likely all affect from decadron injection which will last several days, advised to give Losartan 25mg po x1 now, continue home dose of Metoprolol XL 25mg po but in the morning on 5/31. Patient will monitor BP at home as he has in the past and follow up with Dr Preciado in the afternoon for further recommendations and monitoring.  Advised Losartan 25mg po daily for 10days if needed e-prescribed to patients agreed upon pharmacy, only  if you blood pressure is still elevated and as advised AFTER you see Dr Preciado.  If he advised different, you do not have to  the medication.   patient and sister at bedside agree, wife at bedside as well.

## 2024-05-30 NOTE — DISCHARGE NOTE PROVIDER - NSDCMRMEDTOKEN_GEN_ALL_CORE_FT
Aspirin Enteric Coated 81 mg oral delayed release tablet: 1 tab(s) orally once a day  Augmentin 500 mg-125 mg oral tablet: 1 tab(s) orally 2 times a day for 7days  finasteride 5 mg oral tablet: 1 tab(s) orally once a day  Lipitor 40 mg oral tablet: 1 tab(s) orally once a day alternating with 80mg every other day  losartan 25 mg oral tablet: 1 tab(s) orally once as needed for  systolic blood pressure above 150  metoprolol succinate 50 mg oral tablet, extended release: 1 tab(s) orally once a day  pantoprazole 40 mg oral delayed release tablet: 1 tab(s) orally once a day (before a meal)  Synthroid 75 mcg (0.075 mg) oral tablet: 1 tab(s) orally once a day

## 2024-05-30 NOTE — H&P CARDIOLOGY - NSICDXPASTMEDICALHX_GEN_ALL_CORE_FT
PAST MEDICAL HISTORY:  Acute on chronic blood loss anemia     Benign prostatic hypertrophy     CAD (coronary artery disease)     Coronary artery disease     HTN (hypertension)     Myocardial infarction acute anterior wall 2009    Stented coronary artery endeavor x2 RCA 2009 - [facility]     PAST MEDICAL HISTORY:  Acute on chronic blood loss anemia     Benign prostatic hypertrophy     CAD (coronary artery disease)     Coronary artery disease     H/O heart artery stent     H/O laryngitis     History of upper respiratory infection     HTN (hypertension)     Myocardial infarction acute anterior wall 2009    Stented coronary artery endeavor x2 RCA 2009 - [facility]

## 2024-05-30 NOTE — ED ADULT NURSE NOTE - NSFALLUNIVINTERV_ED_ALL_ED
Bed/Stretcher in lowest position, wheels locked, appropriate side rails in place/Call bell, personal items and telephone in reach/Instruct patient to call for assistance before getting out of bed/chair/stretcher/Non-slip footwear applied when patient is off stretcher/Sieper to call system/Physically safe environment - no spills, clutter or unnecessary equipment/Purposeful proactive rounding/Room/bathroom lighting operational, light cord in reach

## 2024-05-30 NOTE — ED PROVIDER NOTE - OBJECTIVE STATEMENT
73-year-old male past medical history of CAD status post 5 stents, most recently in 2020, on aspirin and Plavix, hypertension, hyperlipidemia, hypothyroidism presents ED complaining of intermittent left-sided chest discomfort with associated left arm numbness and tingling.  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  Patient states took 81 of aspirin, also noticed intermittent flushing of face, called EMS was found to be hypertensive, was given additional 3 ask 81 aspirin for total of 324 ASA.  Patient called cardiologist Dr. Segundo Preciado, was told to go to the ED for possible catheterization.  Spoke to Dr. Todd's interventional cardiology and was scheduled for Cath Lab procedure.  Patient now complaining of intermittent chest discomfort..

## 2024-05-30 NOTE — H&P CARDIOLOGY - SOURCE
- reliable and medical records/Patient and sister - reliable, wife at bedside and medical records/Patient

## 2024-05-30 NOTE — DISCHARGE NOTE NURSING/CASE MANAGEMENT/SOCIAL WORK - NSDCPEFALRISK_GEN_ALL_CORE
For information on Fall & Injury Prevention, visit: [email or web address] OR  [email or web address] OR  https://www.cdc.gov/steadi/patient.html

## 2024-05-30 NOTE — DISCHARGE NOTE NURSING/CASE MANAGEMENT/SOCIAL WORK - PATIENT PORTAL LINK FT
You can access the FollowMyHealth Patient Portal offered by [facility] by registering at the following website: [email or web address]. By joining [employer]’s FollowMyHealth portal, you will also be able to view your health information using other applications (apps) compatible with our system.

## 2024-05-30 NOTE — ED ADULT NURSE NOTE - OBJECTIVE STATEMENT
Break RN: Received pt to bed 27, A+Ox4, ambulatory. pt found to have elevated BP while working, face noted to be flushed, voice noted to be hoarse. pt states he got steroid injection yesterday for laryngitis.  Respirations even and unlabored, normal work of breathing, no accessory muscle use, speaking in full clear uninterrupted sentences. ABD is soft, non tender, non distended. Pt denies any chest pain, SOB, headache, dizziness, N+V, diarrhea, fever, chills.  20G to left forearm, Labs sent, plan of care ongoing, no further concerns as of present, patient expresses no other needs at this time, call light within reach.

## 2024-05-30 NOTE — DISCHARGE NOTE PROVIDER - NSDCCPTREATMENT_GEN_ALL_CORE_FT
PRINCIPAL PROCEDURE  Procedure: Left heart cardiac catheterization  Findings and Treatment: via RRA access, patent stents, non-obstructive CAD

## 2024-07-03 NOTE — ED PROVIDER NOTE - CHIEF COMPLAINT
Patient is scheduled to be seen on Friday 07/05/2024 can you send enough medication for him to make it to the appointment    The patient is a 73y Male complaining of chest pain.

## 2024-12-28 ENCOUNTER — INPATIENT (INPATIENT)
Facility: HOSPITAL | Age: 74
LOS: 1 days | Discharge: HOME | End: 2024-12-30
Attending: GENERAL PRACTICE | Admitting: GENERAL PRACTICE
Payer: COMMERCIAL

## 2024-12-28 VITALS
DIASTOLIC BLOOD PRESSURE: 64 MMHG | SYSTOLIC BLOOD PRESSURE: 146 MMHG | OXYGEN SATURATION: 98 % | RESPIRATION RATE: 16 BRPM | HEART RATE: 66 BPM | TEMPERATURE: 98 F | WEIGHT: 184.97 LBS | HEIGHT: 67 IN

## 2024-12-28 DIAGNOSIS — R11.2 NAUSEA WITH VOMITING, UNSPECIFIED: ICD-10-CM

## 2024-12-28 DIAGNOSIS — R42 DIZZINESS AND GIDDINESS: ICD-10-CM

## 2024-12-28 DIAGNOSIS — K21.9 GASTRO-ESOPHAGEAL REFLUX DISEASE WITHOUT ESOPHAGITIS: ICD-10-CM

## 2024-12-28 DIAGNOSIS — I10 ESSENTIAL (PRIMARY) HYPERTENSION: ICD-10-CM

## 2024-12-28 DIAGNOSIS — E03.9 HYPOTHYROIDISM, UNSPECIFIED: ICD-10-CM

## 2024-12-28 DIAGNOSIS — I25.10 ATHEROSCLEROTIC HEART DISEASE OF NATIVE CORONARY ARTERY WITHOUT ANGINA PECTORIS: ICD-10-CM

## 2024-12-28 DIAGNOSIS — K52.9 NONINFECTIVE GASTROENTERITIS AND COLITIS, UNSPECIFIED: ICD-10-CM

## 2024-12-28 DIAGNOSIS — N40.0 BENIGN PROSTATIC HYPERPLASIA WITHOUT LOWER URINARY TRACT SYMPTOMS: ICD-10-CM

## 2024-12-28 PROBLEM — Z87.09 PERSONAL HISTORY OF OTHER DISEASES OF THE RESPIRATORY SYSTEM: Chronic | Status: ACTIVE | Noted: 2024-05-30

## 2024-12-28 PROBLEM — Z95.5 PRESENCE OF CORONARY ANGIOPLASTY IMPLANT AND GRAFT: Chronic | Status: ACTIVE | Noted: 2024-05-30

## 2024-12-28 LAB
ALBUMIN SERPL ELPH-MCNC: 4.7 G/DL — SIGNIFICANT CHANGE UP (ref 3.3–5)
ALP SERPL-CCNC: 97 U/L — SIGNIFICANT CHANGE UP (ref 40–120)
ALT FLD-CCNC: 38 U/L — SIGNIFICANT CHANGE UP (ref 4–41)
ANION GAP SERPL CALC-SCNC: 13 MMOL/L — SIGNIFICANT CHANGE UP (ref 7–14)
AST SERPL-CCNC: 33 U/L — SIGNIFICANT CHANGE UP (ref 4–40)
BASOPHILS # BLD AUTO: 0.04 K/UL — SIGNIFICANT CHANGE UP (ref 0–0.2)
BASOPHILS NFR BLD AUTO: 0.3 % — SIGNIFICANT CHANGE UP (ref 0–2)
BILIRUB SERPL-MCNC: 1 MG/DL — SIGNIFICANT CHANGE UP (ref 0.2–1.2)
BUN SERPL-MCNC: 23 MG/DL — SIGNIFICANT CHANGE UP (ref 7–23)
CALCIUM SERPL-MCNC: 9.3 MG/DL — SIGNIFICANT CHANGE UP (ref 8.4–10.5)
CHLORIDE SERPL-SCNC: 102 MMOL/L — SIGNIFICANT CHANGE UP (ref 98–107)
CO2 SERPL-SCNC: 23 MMOL/L — SIGNIFICANT CHANGE UP (ref 22–31)
CREAT SERPL-MCNC: 0.83 MG/DL — SIGNIFICANT CHANGE UP (ref 0.5–1.3)
EGFR: 92 ML/MIN/1.73M2 — SIGNIFICANT CHANGE UP
EOSINOPHIL # BLD AUTO: 0.02 K/UL — SIGNIFICANT CHANGE UP (ref 0–0.5)
EOSINOPHIL NFR BLD AUTO: 0.1 % — SIGNIFICANT CHANGE UP (ref 0–6)
FLUAV AG NPH QL: SIGNIFICANT CHANGE UP
FLUBV AG NPH QL: SIGNIFICANT CHANGE UP
GAS PNL BLDV: SIGNIFICANT CHANGE UP
GLUCOSE SERPL-MCNC: 219 MG/DL — HIGH (ref 70–99)
HCT VFR BLD CALC: 47.9 % — SIGNIFICANT CHANGE UP (ref 39–50)
HGB BLD-MCNC: 15.8 G/DL — SIGNIFICANT CHANGE UP (ref 13–17)
HOROWITZ INDEX BLDV+IHG-RTO: SIGNIFICANT CHANGE UP
IANC: 13.42 K/UL — HIGH (ref 1.8–7.4)
IMM GRANULOCYTES NFR BLD AUTO: 0.5 % — SIGNIFICANT CHANGE UP (ref 0–0.9)
LIDOCAIN IGE QN: 27 U/L — SIGNIFICANT CHANGE UP (ref 7–60)
LYMPHOCYTES # BLD AUTO: 0.45 K/UL — LOW (ref 1–3.3)
LYMPHOCYTES # BLD AUTO: 3.1 % — LOW (ref 13–44)
MAGNESIUM SERPL-MCNC: 2.1 MG/DL — SIGNIFICANT CHANGE UP (ref 1.6–2.6)
MCHC RBC-ENTMCNC: 27 PG — SIGNIFICANT CHANGE UP (ref 27–34)
MCHC RBC-ENTMCNC: 33 G/DL — SIGNIFICANT CHANGE UP (ref 32–36)
MCV RBC AUTO: 81.9 FL — SIGNIFICANT CHANGE UP (ref 80–100)
MONOCYTES # BLD AUTO: 0.41 K/UL — SIGNIFICANT CHANGE UP (ref 0–0.9)
MONOCYTES NFR BLD AUTO: 2.8 % — SIGNIFICANT CHANGE UP (ref 2–14)
NEUTROPHILS # BLD AUTO: 13.42 K/UL — HIGH (ref 1.8–7.4)
NEUTROPHILS NFR BLD AUTO: 93.2 % — HIGH (ref 43–77)
NRBC # BLD: 0 /100 WBCS — SIGNIFICANT CHANGE UP (ref 0–0)
NRBC # FLD: 0 K/UL — SIGNIFICANT CHANGE UP (ref 0–0)
PHOSPHATE SERPL-MCNC: 2.5 MG/DL — SIGNIFICANT CHANGE UP (ref 2.5–4.5)
PLATELET # BLD AUTO: 212 K/UL — SIGNIFICANT CHANGE UP (ref 150–400)
POTASSIUM SERPL-MCNC: 4.7 MMOL/L — SIGNIFICANT CHANGE UP (ref 3.5–5.3)
POTASSIUM SERPL-SCNC: 4.7 MMOL/L — SIGNIFICANT CHANGE UP (ref 3.5–5.3)
PROT SERPL-MCNC: 8.2 G/DL — SIGNIFICANT CHANGE UP (ref 6–8.3)
RBC # BLD: 5.85 M/UL — HIGH (ref 4.2–5.8)
RBC # FLD: 13.6 % — SIGNIFICANT CHANGE UP (ref 10.3–14.5)
RSV RNA NPH QL NAA+NON-PROBE: SIGNIFICANT CHANGE UP
SARS-COV-2 RNA SPEC QL NAA+PROBE: SIGNIFICANT CHANGE UP
SODIUM SERPL-SCNC: 138 MMOL/L — SIGNIFICANT CHANGE UP (ref 135–145)
WBC # BLD: 14.41 K/UL — HIGH (ref 3.8–10.5)
WBC # FLD AUTO: 14.41 K/UL — HIGH (ref 3.8–10.5)

## 2024-12-28 PROCEDURE — 71100 X-RAY EXAM RIBS UNI 2 VIEWS: CPT | Mod: 26,RT

## 2024-12-28 PROCEDURE — 99285 EMERGENCY DEPT VISIT HI MDM: CPT | Mod: GC

## 2024-12-28 PROCEDURE — 74177 CT ABD & PELVIS W/CONTRAST: CPT | Mod: 26,MC

## 2024-12-28 RX ORDER — ATORVASTATIN CALCIUM 40 MG/1
40 TABLET, FILM COATED ORAL AT BEDTIME
Refills: 0 | Status: DISCONTINUED | OUTPATIENT
Start: 2024-12-28 | End: 2024-12-30

## 2024-12-28 RX ORDER — ONDANSETRON 4 MG/1
4 TABLET ORAL EVERY 8 HOURS
Refills: 0 | Status: DISCONTINUED | OUTPATIENT
Start: 2024-12-28 | End: 2024-12-30

## 2024-12-28 RX ORDER — SODIUM CHLORIDE 9 MG/ML
1000 INJECTION, SOLUTION INTRAMUSCULAR; INTRAVENOUS; SUBCUTANEOUS ONCE
Refills: 0 | Status: COMPLETED | OUTPATIENT
Start: 2024-12-28 | End: 2024-12-28

## 2024-12-28 RX ORDER — LEVOTHYROXINE SODIUM 175 UG/1
75 TABLET ORAL DAILY
Refills: 0 | Status: DISCONTINUED | OUTPATIENT
Start: 2024-12-28 | End: 2024-12-30

## 2024-12-28 RX ORDER — FAMOTIDINE 20 MG/1
20 TABLET, FILM COATED ORAL ONCE
Refills: 0 | Status: DISCONTINUED | OUTPATIENT
Start: 2024-12-28 | End: 2024-12-30

## 2024-12-28 RX ORDER — ONDANSETRON 4 MG/1
4 TABLET ORAL ONCE
Refills: 0 | Status: COMPLETED | OUTPATIENT
Start: 2024-12-28 | End: 2024-12-28

## 2024-12-28 RX ORDER — FAMOTIDINE 20 MG/1
20 TABLET, FILM COATED ORAL ONCE
Refills: 0 | Status: COMPLETED | OUTPATIENT
Start: 2024-12-28 | End: 2024-12-28

## 2024-12-28 RX ORDER — FINASTERIDE 5 MG
5 TABLET ORAL DAILY
Refills: 0 | Status: DISCONTINUED | OUTPATIENT
Start: 2024-12-28 | End: 2024-12-30

## 2024-12-28 RX ORDER — ONDANSETRON 4 MG/1
4 TABLET ORAL EVERY 8 HOURS
Refills: 0 | Status: DISCONTINUED | OUTPATIENT
Start: 2024-12-28 | End: 2024-12-28

## 2024-12-28 RX ORDER — MAG HYDROX/ALUMINUM HYD/SIMETH 200-200-20
30 SUSPENSION, ORAL (FINAL DOSE FORM) ORAL ONCE
Refills: 0 | Status: COMPLETED | OUTPATIENT
Start: 2024-12-28 | End: 2024-12-28

## 2024-12-28 RX ORDER — ASPIRIN 81 MG
81 TABLET, DELAYED RELEASE (ENTERIC COATED) ORAL DAILY
Refills: 0 | Status: DISCONTINUED | OUTPATIENT
Start: 2024-12-28 | End: 2024-12-30

## 2024-12-28 RX ORDER — METOPROLOL TARTRATE 50 MG
50 TABLET ORAL DAILY
Refills: 0 | Status: DISCONTINUED | OUTPATIENT
Start: 2024-12-28 | End: 2024-12-30

## 2024-12-28 RX ADMIN — Medication 30 MILLILITER(S): at 11:54

## 2024-12-28 RX ADMIN — Medication 5 MILLIGRAM(S): at 22:25

## 2024-12-28 RX ADMIN — ONDANSETRON 4 MILLIGRAM(S): 4 TABLET ORAL at 08:56

## 2024-12-28 RX ADMIN — ONDANSETRON 4 MILLIGRAM(S): 4 TABLET ORAL at 11:54

## 2024-12-28 RX ADMIN — FAMOTIDINE 20 MILLIGRAM(S): 20 TABLET, FILM COATED ORAL at 11:53

## 2024-12-28 RX ADMIN — ATORVASTATIN CALCIUM 40 MILLIGRAM(S): 40 TABLET, FILM COATED ORAL at 22:25

## 2024-12-28 RX ADMIN — SODIUM CHLORIDE 1000 MILLILITER(S): 9 INJECTION, SOLUTION INTRAMUSCULAR; INTRAVENOUS; SUBCUTANEOUS at 08:56

## 2024-12-28 RX ADMIN — SODIUM CHLORIDE 1000 MILLILITER(S): 9 INJECTION, SOLUTION INTRAMUSCULAR; INTRAVENOUS; SUBCUTANEOUS at 08:50

## 2024-12-28 NOTE — ED ADULT NURSE REASSESSMENT NOTE - NS ED NURSE REASSESS COMMENT FT1
pt remains alert, oriented x3. to bathroom with assistance in wheelchair x 4 since ed arrival. no vomiting. will continue to monitor

## 2024-12-28 NOTE — H&P ADULT - PROBLEM SELECTOR PLAN 1
Slightly improving.   Will check stool PCR.  Start on CLD then advance as tolerated.  Leucocytosis likely secondary to AGE.  < from: CT Abdomen and Pelvis w/ IV Cont (12.28.24 @ 12:34) >    IMPRESSION:  No evidence of bowel obstruction, diverticulitis, or colitis.  No visualized displaced right-sided rib fractures.    < end of copied text >

## 2024-12-28 NOTE — ED PROVIDER NOTE - ATTENDING CONTRIBUTION TO CARE
Attending MD Franco: I have seen and examined this patient and fully participated in the care of this patient as the teaching attending. I personally made/approved the management plan and take responsibility for the patient management.    Patient is a 74-year-old male with history of CAD status post stents x 5 presenting to the ED with diarrhea.  Since last night patient has had multiple episodes of watery loose stools, nonbloody.  Daughter recently admitted for norovirus.  Associated nausea and emesis.  No abdominal pain, fever, chills, recent antibiotic use, recent travel.  Patient had syncopal episode which he describes as a vasovagal episode she has had in the past, fell onto his right side complaining of right   Rib pain.  No preceding chest pain or shortness of breath.  Denies any head injury.  No vision changes, headache or altered mental status.    Review of systems otherwise negative.    General: Alert and Orientated x 3. No apparent distress.  Head: Normocephalic and atraumatic.  Eyes: PERRLA with EOMI.  Neck: Supple. Trachea midline.   Cardiac: Normal S1 and S2 w/ RRR. No murmurs appreciated. No JVD appreciated. right lateral chest wall tenderness to palpation around T 5-6.  Pulmonary: CTA bilaterally. No increased WOB. No wheezes or crackles.  Abdominal: Soft, non-tender. (+) bowel sounds appreciated in all 4 quadrants. No hepatosplenomegaly.   Neurologic:  cranial 2-12 grossly intact  Musculoskeletal: Strength appropriate in all 4 extremities for age with no limited ROM.  Skin: Color appropriate for race. Intact, warm, and well-perfused.  Psychiatric: Appropriate mood and affect. No apparent risk to self or others.     MDM:   Patient hemodynamically stable.  Concern for vasovagal syncope secondary to dehydration, fluid losses in the setting of diarrheal disease.  Less likely cardiogenic in nature.  Will get labs,   Stool studies to evaluate for metabolic derangements, infectious etiology.  Will fluid resuscitate.  Will  obtain EKG to evaluate for syncopal episode. X-ray to evaluate for rib fracture.  Disposition pending labs and imaging and reassessment.    *The above represents an initial assessment/impression. Please refer to progress notes for potential changes in patient clinical course*

## 2024-12-28 NOTE — ED ADULT NURSE NOTE - NSFALLHARMRISKINTERV_ED_ALL_ED
Assistance OOB with selected safe patient handling equipment if applicable/Communicate risk of Fall with Harm to all staff, patient, and family/Provide visual cue: red socks, yellow wristband, yellow gown, etc/Reinforce activity limits and safety measures with patient and family/Bed in lowest position, wheels locked, appropriate side rails in place/Call bell, personal items and telephone in reach/Instruct patient to call for assistance before getting out of bed/chair/stretcher/Non-slip footwear applied when patient is off stretcher/[place] to call system/Physically safe environment - no spills, clutter or unnecessary equipment/Purposeful Proactive Rounding/Room/bathroom lighting operational, light cord in reach

## 2024-12-28 NOTE — ED PROVIDER NOTE - NSICDXPASTMEDICALHX_GEN_ALL_CORE_FT
PAST MEDICAL HISTORY:  Acute on chronic blood loss anemia     Benign prostatic hypertrophy     CAD (coronary artery disease)     Coronary artery disease     H/O heart artery stent     H/O laryngitis     History of upper respiratory infection     HTN (hypertension)     Myocardial infarction acute anterior wall 2009    Stented coronary artery endeavor x2 RCA 2009 - [facility]

## 2024-12-28 NOTE — PATIENT PROFILE ADULT - FUNCTIONAL ASSESSMENT - DAILY ACTIVITY SECTION LABEL
- rapid strep test was negative.  - Rest and push fluids. May use Tylenol or Ibuprofen as needed for pain or fever.    - Can try cepacol throat lozenges, gargle warm salt water, popsicles  - Follow up with PCP in 3-5 days if symptoms do not improve, sooner if symptoms worsen.     .

## 2024-12-28 NOTE — PATIENT PROFILE ADULT - HISTORY OF COVID-19 VACCINATION
Spoke with Dr. Glass regarding pt, stating she got 4 dose at home of calcium carbonate today she was only schudele to get 3 dose. MD stated it was ok to give extra dose. Will continue to monitor.    Yes

## 2024-12-28 NOTE — ED PROVIDER NOTE - CLINICAL SUMMARY MEDICAL DECISION MAKING FREE TEXT BOX
73-year-old male presenting with nausea, vomiting, diarrhea.  Daughter has Norovirus at home.  3 episodes of nonbloody emesis/diarrhea.  Patient felt lightheaded at 3 AM after standing up from the toilet fell onto his right side, no LOC or head strike not on AC.  Mild right-sided pain around ribs 7 through 10.  Abdomen is soft nontender to palpation, negative Richmond sign.  Patient is well-appearing afebrile, hemodynamically stable.  Likely viral illness. Patients fall likely vasovagal or weakness secondary to dehydration. Unlikely cardiac as patient denies chest pain, sob, event had prodrome of nausea. Will obtain cbc, mg, phos, cmp, CXR R rib to eval for fracture and tx with fluid resuscitation. Will re-evaluate after fluids. 74-year-old male presenting with nausea, vomiting, diarrhea.  Daughter has Norovirus at home.  3 episodes of nonbloody emesis/diarrhea.  Patient felt lightheaded at 3 AM after standing up from the toilet fell onto his right side, no LOC or head strike not on AC.  Mild right-sided pain around ribs 7 through 10.  Abdomen is soft nontender to palpation, negative [place] sign.  Patient is well-appearing afebrile, hemodynamically stable.  Likely viral illness. Patients fall likely vasovagal or weakness secondary to dehydration. Unlikely cardiac as patient denies chest pain, sob, event had prodrome of nausea. Will obtain cbc, mg, phos, cmp, CXR R rib to eval for fracture and tx with fluid resuscitation. Will re-evaluate after fluids. 74-year-old male presenting with nausea, vomiting, diarrhea.  Daughter has Norovirus at home.  3 episodes of nonbloody emesis/diarrhea.  Patient felt lightheaded at 3 AM after standing up from the toilet fell onto his right side, no LOC or head strike not on AC.  Mild right-sided pain around ribs 7 through 10.  Abdomen is soft nontender to palpation, negative Richmond sign.  Patient is well-appearing afebrile, hemodynamically stable.  Likely viral illness. Patients fall likely vasovagal or weakness secondary to dehydration. Unlikely cardiac as patient denies chest pain, sob, event had prodrome of nausea. Will obtain EKG. Will obtain cbc, mg, phos, cmp, CXR R rib to eval for fracture and tx with fluid resuscitation. Will re-evaluate after fluids.

## 2024-12-28 NOTE — ED ADULT TRIAGE NOTE - WEIGHT METHOD
[No Acute Distress] : no acute distress [Well Nourished] : well nourished [Normal Sclera/Conjunctiva] : normal sclera/conjunctiva [EOMI] : extraocular movements intact [Normal Outer Ear/Nose] : the outer ears and nose were normal in appearance [Normal Oropharynx] : the oropharynx was normal [No Lymphadenopathy] : no lymphadenopathy [Supple] : supple [Thyroid Normal, No Nodules] : the thyroid was normal and there were no nodules present [No Respiratory Distress] : no respiratory distress  [Clear to Auscultation] : lungs were clear to auscultation bilaterally [No Accessory Muscle Use] : no accessory muscle use [Normal Rate] : normal rate  [Regular Rhythm] : with a regular rhythm [Normal S1, S2] : normal S1 and S2 [No Murmur] : no murmur heard [Pedal Pulses Present] : the pedal pulses are present [No Edema] : there was no peripheral edema [No Extremity Clubbing/Cyanosis] : no extremity clubbing/cyanosis [Soft] : abdomen soft [Non Tender] : non-tender [Non-distended] : non-distended stated [No Masses] : no abdominal mass palpated [No HSM] : no HSM [Normal Bowel Sounds] : normal bowel sounds [Normal Posterior Cervical Nodes] : no posterior cervical lymphadenopathy [Normal Anterior Cervical Nodes] : no anterior cervical lymphadenopathy [No CVA Tenderness] : no CVA  tenderness [No Spinal Tenderness] : no spinal tenderness [No Joint Swelling] : no joint swelling [Grossly Normal Strength/Tone] : grossly normal strength/tone [No Rash] : no rash [Coordination Grossly Intact] : coordination grossly intact [Normal Gait] : normal gait [No Focal Deficits] : no focal deficits [Normal Affect] : the affect was normal [Normal Insight/Judgement] : insight and judgment were intact

## 2024-12-28 NOTE — H&P ADULT - ASSESSMENT
74-year-old male past medical history of HTN , BPH , Hypothyroidism  & CAD status post 5 stents presenting to the emergency department with nausea, vomiting and diarrhea.  Patient endorses daughter is a physician and has the Norovirus.  Patient 3 episodes of nonbloody emesis last night.  Endorses an episode of watery diarrhea at 3 AM this morning.  Patient felt lightheaded after standing up from the toilet and fell to the ground on his right side.  He denies loss of consciousness or head strike.  Patient was able to get up on his own and ambulate after incident.  Patient denies nausea at this time, endorses mild right-sided pain around ribs 7 through 10.  Denies any fever, abdominal pain, chest pain, shortness of breath.

## 2024-12-28 NOTE — ED PROVIDER NOTE - PHYSICAL EXAMINATION
GENERAL: Awake, alert, NAD  HEENT: NC/AT, dry appearing  CARDIAC: RRR, no m/r/g  ABDOMEN: Soft, non tender, non distended, no rebound, no guarding  BACK: ribs 7-10 on R ttp   EXT: No edema, no calf tenderness,   NEURO: A&Ox3. Moving all extremities.  SKIN: Warm and dry. No rash.  PSYCH: Normal affect.

## 2024-12-28 NOTE — ED ADULT NURSE NOTE - NSICDXPASTMEDICALHX_GEN_ALL_CORE_FT
PAST MEDICAL HISTORY:  Acute on chronic blood loss anemia     Benign prostatic hypertrophy     CAD (coronary artery disease)     Coronary artery disease     H/O heart artery stent     H/O laryngitis     History of upper respiratory infection     HTN (hypertension)     Myocardial infarction acute anterior wall 2009    Stented coronary artery endeavor x2 RCA 2009 - [facility]

## 2024-12-28 NOTE — H&P ADULT - TIME BILLING
Admission H&P including bedside history , examination , reviewing test results and treatment plan. Cardiology will follow in AM  .D/W Patient . wife and ACP.

## 2024-12-28 NOTE — PATIENT PROFILE ADULT - FALL HARM RISK - RISK INTERVENTIONS
Communicate Fall Risk and Risk Factors to all staff, patient, and family/Reinforce activity limits and safety measures with patient and family/Use of alarms - bed, chair and/or voice tab/Visual Cue: Yellow wristband/Bed in lowest position, wheels locked, appropriate side rails in place/Call bell, personal items and telephone in reach/Instruct patient to call for assistance before getting out of bed or chair/Non-slip footwear when patient is out of bed/[place] to call system/Physically safe environment - no spills, clutter or unnecessary equipment/Purposeful Proactive Rounding/Room/bathroom lighting operational, light cord in reach

## 2024-12-28 NOTE — ED PROVIDER NOTE - OBJECTIVE STATEMENT
73-year-old male past medical history of CAD status post 5 stents presenting to the emergency department with nausea, vomiting and diarrhea.  Patient endorses daughter is a physician and has the Norovirus.  Patient 3 episodes of nonbloody emesis last night.  Endorses an episode of watery diarrhea at 3 AM this morning.  Patient felt lightheaded after standing up from the toilet and fell to the ground on his right side.  He denies loss of consciousness or head strike.  Patient was able to get up on his own and ambulate after incident.  Patient denies nausea at this time, endorses mild right-sided pain around ribs 7 through 10.  Denies any fever, abdominal pain, chest pain, shortness of breath. 74-year-old male past medical history of CAD status post 5 stents presenting to the emergency department with nausea, vomiting and diarrhea.  Patient endorses daughter is a physician and has the Norovirus.  Patient 3 episodes of nonbloody emesis last night.  Endorses an episode of watery diarrhea at 3 AM this morning.  Patient felt lightheaded after standing up from the toilet and fell to the ground on his right side.  He denies loss of consciousness or head strike.  Patient was able to get up on his own and ambulate after incident.  Patient denies nausea at this time, endorses mild right-sided pain around ribs 7 through 10.  Denies any fever, abdominal pain, chest pain, shortness of breath.

## 2024-12-28 NOTE — ED ADULT NURSE NOTE - NS ED NURSE REPORT GIVEN TO FT
delbert rosa Xolair Counseling:  Patient informed of potential adverse effects including but not limited to fever, muscle aches, rash and allergic reactions.  The patient verbalized understanding of the proper use and possible adverse effects of Xolair.  All of the patient's questions and concerns were addressed.

## 2024-12-28 NOTE — ED ADULT TRIAGE NOTE - CHIEF COMPLAINT QUOTE
pt c/o n/v, diarrhea since 3:30 am. states family at home have similar symptoms. Hx. HTN. CAD (1 stent). pt denies chest pain, abdominal pain, fevers. pt well appearing.

## 2024-12-28 NOTE — ED ADULT NURSE NOTE - OBJECTIVE STATEMENT
pt  in rm 26. alert, ,oriented  x 3. reports n,v,d this am. states  " felt weak at home and fell to floor" denies loc. c/o pain r rib area. ekg at present. iv access,labs sent. will continue to monitor/ medicated as ordered for c/o nausea

## 2024-12-29 LAB
ANION GAP SERPL CALC-SCNC: 11 MMOL/L — SIGNIFICANT CHANGE UP (ref 7–14)
BUN SERPL-MCNC: 15 MG/DL — SIGNIFICANT CHANGE UP (ref 7–23)
CALCIUM SERPL-MCNC: 8.6 MG/DL — SIGNIFICANT CHANGE UP (ref 8.4–10.5)
CHLORIDE SERPL-SCNC: 105 MMOL/L — SIGNIFICANT CHANGE UP (ref 98–107)
CO2 SERPL-SCNC: 24 MMOL/L — SIGNIFICANT CHANGE UP (ref 22–31)
CREAT SERPL-MCNC: 0.92 MG/DL — SIGNIFICANT CHANGE UP (ref 0.5–1.3)
EGFR: 87 ML/MIN/1.73M2 — SIGNIFICANT CHANGE UP
GLUCOSE SERPL-MCNC: 142 MG/DL — HIGH (ref 70–99)
HCT VFR BLD CALC: 42.8 % — SIGNIFICANT CHANGE UP (ref 39–50)
HGB BLD-MCNC: 14.3 G/DL — SIGNIFICANT CHANGE UP (ref 13–17)
MCHC RBC-ENTMCNC: 26.9 PG — LOW (ref 27–34)
MCHC RBC-ENTMCNC: 33.4 G/DL — SIGNIFICANT CHANGE UP (ref 32–36)
MCV RBC AUTO: 80.6 FL — SIGNIFICANT CHANGE UP (ref 80–100)
NRBC # BLD: 0 /100 WBCS — SIGNIFICANT CHANGE UP (ref 0–0)
NRBC # FLD: 0 K/UL — SIGNIFICANT CHANGE UP (ref 0–0)
PLATELET # BLD AUTO: 188 K/UL — SIGNIFICANT CHANGE UP (ref 150–400)
POTASSIUM SERPL-MCNC: 3.6 MMOL/L — SIGNIFICANT CHANGE UP (ref 3.5–5.3)
POTASSIUM SERPL-SCNC: 3.6 MMOL/L — SIGNIFICANT CHANGE UP (ref 3.5–5.3)
RBC # BLD: 5.31 M/UL — SIGNIFICANT CHANGE UP (ref 4.2–5.8)
RBC # FLD: 13.7 % — SIGNIFICANT CHANGE UP (ref 10.3–14.5)
SODIUM SERPL-SCNC: 140 MMOL/L — SIGNIFICANT CHANGE UP (ref 135–145)
WBC # BLD: 7.09 K/UL — SIGNIFICANT CHANGE UP (ref 3.8–10.5)
WBC # FLD AUTO: 7.09 K/UL — SIGNIFICANT CHANGE UP (ref 3.8–10.5)

## 2024-12-29 RX ADMIN — Medication 81 MILLIGRAM(S): at 11:59

## 2024-12-29 RX ADMIN — LEVOTHYROXINE SODIUM 75 MICROGRAM(S): 175 TABLET ORAL at 05:40

## 2024-12-29 RX ADMIN — Medication 5 MILLIGRAM(S): at 16:01

## 2024-12-29 RX ADMIN — Medication 50 MILLIGRAM(S): at 05:40

## 2024-12-29 RX ADMIN — Medication 5 MILLIGRAM(S): at 21:50

## 2024-12-29 RX ADMIN — ATORVASTATIN CALCIUM 40 MILLIGRAM(S): 40 TABLET, FILM COATED ORAL at 21:49

## 2024-12-29 NOTE — PROVIDER CONTACT NOTE (OTHER) - ASSESSMENT
Pt is A&Ox4. Pt denies SOB, chest pain, headache, palpitations, and lightheadedness. Pt latest vitals are /57 and HR 73. No signs of acute distress. Pt laying comfortably in bed with equal chest expansion with no signs of respiratory distress.

## 2024-12-29 NOTE — PROGRESS NOTE ADULT - ASSESSMENT
_________________________________________________________________________________________  ========>>  M E D I C A L   A T T E N D I N G    F O L L O W  U P  N O T E  <<=========  -----------------------------------------------------------------------------------------------------    - Patient seen and examined by me earlier today.   - In summary,  GONZÁLEZ WHITLOCK is a 74y year old man admitted with   - Patient today overall doing ok, comfortable, eating OK.     ==================>> REVIEW OF SYSTEM <<=================    GEN: no fever, no chills, no pain  RESP: no SOB, no cough, no sputum  CVS: no chest pain, no palpitations  GI: no abdominal pain, no nausea, no constipation, no diarrhea  : no dysuria, no frequency  Neuro: no headache, no dizziness    ==================>> PHYSICAL EXAM <<=================    GEN: A&O X 3 , NAD , comfortable, pleasant, calm   HEENT: NCAT, PERRL, MMM, hearing intact  CVS: S1S2 , regular , No M/R/G appreciated  PULM: CTA B/L,  no W/R/R appreciated  ABD.: soft. non tender, non distended,  bowel sounds present  Extrem: intact pulses , no edema             ( Note written / Date of service 12-29-24 ( This is certified to be the same as "ENTERED" date above ( for billing purposes)))    ==================>> MEDICATIONS <<====================    MEDICATIONS  (STANDING):  amLODIPine   Tablet 5 milliGRAM(s) Oral daily  aspirin enteric coated 81 milliGRAM(s) Oral daily  atorvastatin 40 milliGRAM(s) Oral at bedtime  famotidine Injectable 20 milliGRAM(s) IV Push once  finasteride 5 milliGRAM(s) Oral daily  levothyroxine 75 MICROGram(s) Oral daily  metoprolol succinate ER 50 milliGRAM(s) Oral daily    MEDICATIONS  (PRN):  ondansetron Injectable 4 milliGRAM(s) IV Push every 8 hours PRN Nausea and/or Vomiting    ___________  Active diet:  Diet, Regular:   DASH/TLC Sodium & Cholesterol Restricted (DASH)  ___________________    ==================>> VITAL SIGNS <<==================    T(C): 36.7 (12-29-24 @ 15:53), Max: 37.3 (12-29-24 @ 00:02)  HR: 60 (12-29-24 @ 15:53) (60 - 82)  BP: 166/68 (12-29-24 @ 15:53) (128/55 - 167/66)  BP(mean): 95 (12-28-24 @ 19:34)  RR: 18 (12-29-24 @ 15:53) (16 - 18)  SpO2: 96% (12-29-24 @ 15:53) (96% - 100%)     CAPILLARY BLOOD GLUCOSE        I&O's Summary    28 Dec 2024 07:01  -  29 Dec 2024 07:00  --------------------------------------------------------  IN: 820 mL / OUT: 100 mL / NET: 720 mL    29 Dec 2024 07:01  -  29 Dec 2024 17:50  --------------------------------------------------------  IN: 520 mL / OUT: 400 mL / NET: 120 mL         ==================>> LAB AND IMAGING <<==================                        14.3   7.09  )-----------( 188      ( 29 Dec 2024 04:18 )             42.8        12-29    140  |  105  |  15  ----------------------------<  142[H]  3.6   |  24  |  0.92    Ca    8.6      29 Dec 2024 04:18  Phos  2.5     12-28  Mg     2.10     12-28    TPro  8.2  /  Alb  4.7  /  TBili  1.0  /  DBili  x   /  AST  33  /  ALT  38  /  AlkPhos  97  12-28                       TSH:      Lipid profile:    HgA1C:     ___________________________________________________________________________________  ===============>>  A S S E S S M E N T   A N ROBINSON   P L A N <<===============  ------------------------------------------------------------------------------------------          -GI/DVT Prophylaxis per protocol.    --------------------------------------------  Case discussed with   Education given on findings and plan of care  ___________________________  H. EDOUARD Roche.  Pager: [phone]       _________________________________________________________________________________________  ========>>  M E D I C A L   A T T E N D I N G    F O L L O W  U P  N O T E  <<=========  -----------------------------------------------------------------------------------------------------    - Patient seen and examined by me earlier today.   - In summary,  GONZÁLEZ WHITLOCK is a 74y year old man admitted with gastroenteritis   - Patient today overall doing ok, comfortable, tolerating liquids >> advanced to solide..     only had 2 very small bouts of diarrhea today. no vomiting.. overall otherwise doing better     ==================>> REVIEW OF SYSTEM <<=================    GEN: no fever, no chills, no pain  RESP: no SOB, no cough, no sputum  CVS: no chest pain, no palpitations  GI: no abdominal pain, as above   : no dysuria, no frequency  Neuro: no headache, no dizziness    ==================>> PHYSICAL EXAM <<=================    GEN: A&O X 3 , NAD , comfortable, pleasant, calm in bed.  encouraged out of bed to chair with assistance as needed.   HEENT: NCAT, PERRL, MMM, hearing intact  CVS: S1S2 , regular , No M/R/G appreciated  PULM: CTA B/L,  no W/R/R appreciated  ABD.: soft. non tender, non distended,  bowel sounds present  Extrem: intact pulses , no edema             ( Note written / Date of service 12-29-24 ( This is certified to be the same as "ENTERED" date above ( for billing purposes)))    ==================>> MEDICATIONS <<====================    MEDICATIONS  (STANDING):  amLODIPine   Tablet 5 milliGRAM(s) Oral daily  aspirin enteric coated 81 milliGRAM(s) Oral daily  atorvastatin 40 milliGRAM(s) Oral at bedtime  famotidine Injectable 20 milliGRAM(s) IV Push once  finasteride 5 milliGRAM(s) Oral daily  levothyroxine 75 MICROGram(s) Oral daily  metoprolol succinate ER 50 milliGRAM(s) Oral daily    MEDICATIONS  (PRN):  ondansetron Injectable 4 milliGRAM(s) IV Push every 8 hours PRN Nausea and/or Vomiting    ___________  Active diet:  Diet, Regular:   DASH/TLC Sodium & Cholesterol Restricted (DASH)  ___________________    ==================>> VITAL SIGNS <<==================    T(C): 36.7 (12-29-24 @ 15:53), Max: 37.3 (12-29-24 @ 00:02)  HR: 60 (12-29-24 @ 15:53) (60 - 82)  BP: 166/68 (12-29-24 @ 15:53) (128/55 - 167/66)  BP(mean): 95 (12-28-24 @ 19:34)  RR: 18 (12-29-24 @ 15:53) (16 - 18)  SpO2: 96% (12-29-24 @ 15:53) (96% - 100%)       I&O's Summary    28 Dec 2024 07:01  -  29 Dec 2024 07:00  --------------------------------------------------------  IN: 820 mL / OUT: 100 mL / NET: 720 mL    29 Dec 2024 07:01  -  29 Dec 2024 17:50  --------------------------------------------------------  IN: 520 mL / OUT: 400 mL / NET: 120 mL         ==================>> LAB AND IMAGING <<==================                        14.3   7.09  )-----------( 188      ( 29 Dec 2024 04:18 )             42.8        12-29    140  |  105  |  15  ----------------------------<  142[H]  3.6   |  24  |  0.92    Ca    8.6      29 Dec 2024 04:18  Phos  2.5     12-28  Mg     2.10     12-28    TPro  8.2  /  Alb  4.7  /  TBili  1.0  /  DBili  x   /  AST  33  /  ALT  38  /  AlkPhos  97  12-28                stool studies / GI PCR pending    ___________________________________________________________________________________  ===============>>  A S S E S S M E N T   A N D   P L A N <<===============  ------------------------------------------------------------------------------------------     74-year-old male past medical history of HTN , BPH , Hypothyroidism  & CAD status post 5 stents presenting to the emergency department with nausea, vomiting and diarrhea.  Patient endorses daughter is a physician and has the Norovirus.  Patient 3 episodes of nonbloody emesis last night.  Endorses an episode of watery diarrhea at 3 AM this morning.  Patient felt lightheaded after standing up from the toilet and fell to the ground on his right side.  He denies loss of consciousness or head strike.  Patient was able to get up on his own and ambulate after incident.  Patient denies nausea at this time, endorses mild right-sided pain around ribs 7 through 10.  Denies any fever, abdominal pain, chest pain, shortness of breath.       Problem/Plan - 1:  ·  Problem: Acute gastroenteritis.   ·  Plan: Slightly improving.   Will check stool PCR.  advance diet as tolerated.  Leucocytosis likely secondary to AGE.  < from: CT Abdomen and Pelvis w/ IV Cont (12.28.24 @ 12:34) >    IMPRESSION:  No evidence of bowel obstruction, diverticulitis, or colitis.  No visualized displaced right-sided rib fractures.    < end of copied text >.     Problem/Plan - 2:  ·  Problem: Lightheadedness.   ·  Plan: Likely secondary to dehydration with ? low BP from AGE .Will check Orthostasis .  Now S/P IVF.     Problem/Plan - 3:  ·  Problem: HTN (hypertension).   ·  Plan: BP medication with hold parameters.     Problem/Plan - 4:  ·  Problem: Hypothyroidism.   ·  Plan: Home dose synthroid.     Problem/Plan - 5:  ·  Problem: CAD in native artery.   ·  Plan: Continuing ASA, BB and Statin.     Problem/Plan - 6:  ·  Problem: BPH without urinary obstruction.   ·  Plan: On Finasteride.     Problem/Plan - 7:  ·  Problem: GERD (gastroesophageal reflux disease).   ·  Plan: Continue PPI.      --------------------------------------------  Case discussed with patient   Education given on findings and plan of care  ___________________________  H. EDOUARD Roche.  Pager: [phone]

## 2024-12-30 ENCOUNTER — TRANSCRIPTION ENCOUNTER (OUTPATIENT)
Age: 74
End: 2024-12-30

## 2024-12-30 VITALS
SYSTOLIC BLOOD PRESSURE: 144 MMHG | DIASTOLIC BLOOD PRESSURE: 69 MMHG | RESPIRATION RATE: 18 BRPM | TEMPERATURE: 98 F | OXYGEN SATURATION: 99 % | HEART RATE: 61 BPM

## 2024-12-30 LAB
A1C WITH ESTIMATED AVERAGE GLUCOSE RESULT: 6.7 % — HIGH (ref 4–5.6)
ANION GAP SERPL CALC-SCNC: 13 MMOL/L — SIGNIFICANT CHANGE UP (ref 7–14)
BUN SERPL-MCNC: 15 MG/DL — SIGNIFICANT CHANGE UP (ref 7–23)
CALCIUM SERPL-MCNC: 8.5 MG/DL — SIGNIFICANT CHANGE UP (ref 8.4–10.5)
CHLORIDE SERPL-SCNC: 104 MMOL/L — SIGNIFICANT CHANGE UP (ref 98–107)
CHOLEST SERPL-MCNC: 96 MG/DL — SIGNIFICANT CHANGE UP
CO2 SERPL-SCNC: 23 MMOL/L — SIGNIFICANT CHANGE UP (ref 22–31)
CREAT SERPL-MCNC: 0.83 MG/DL — SIGNIFICANT CHANGE UP (ref 0.5–1.3)
EGFR: 92 ML/MIN/1.73M2 — SIGNIFICANT CHANGE UP
ESTIMATED AVERAGE GLUCOSE: 146 — SIGNIFICANT CHANGE UP
GI PCR PANEL: DETECTED
GLUCOSE SERPL-MCNC: 129 MG/DL — HIGH (ref 70–99)
HCT VFR BLD CALC: 43.5 % — SIGNIFICANT CHANGE UP (ref 39–50)
HDLC SERPL-MCNC: 44 MG/DL — SIGNIFICANT CHANGE UP
HGB BLD-MCNC: 14 G/DL — SIGNIFICANT CHANGE UP (ref 13–17)
LIPID PNL WITH DIRECT LDL SERPL: 37 MG/DL — SIGNIFICANT CHANGE UP
MAGNESIUM SERPL-MCNC: 2 MG/DL — SIGNIFICANT CHANGE UP (ref 1.6–2.6)
MCHC RBC-ENTMCNC: 26.2 PG — LOW (ref 27–34)
MCHC RBC-ENTMCNC: 32.2 G/DL — SIGNIFICANT CHANGE UP (ref 32–36)
MCV RBC AUTO: 81.5 FL — SIGNIFICANT CHANGE UP (ref 80–100)
NON HDL CHOLESTEROL: 52 MG/DL — SIGNIFICANT CHANGE UP
NOROVIRUS GI+II RNA STL QL NAA+NON-PROBE: DETECTED
NRBC # BLD: 0 /100 WBCS — SIGNIFICANT CHANGE UP (ref 0–0)
NRBC # FLD: 0 K/UL — SIGNIFICANT CHANGE UP (ref 0–0)
PHOSPHATE SERPL-MCNC: 2.3 MG/DL — LOW (ref 2.5–4.5)
PLATELET # BLD AUTO: 179 K/UL — SIGNIFICANT CHANGE UP (ref 150–400)
POTASSIUM SERPL-MCNC: 3.4 MMOL/L — LOW (ref 3.5–5.3)
POTASSIUM SERPL-SCNC: 3.4 MMOL/L — LOW (ref 3.5–5.3)
RBC # BLD: 5.34 M/UL — SIGNIFICANT CHANGE UP (ref 4.2–5.8)
RBC # FLD: 13.8 % — SIGNIFICANT CHANGE UP (ref 10.3–14.5)
SODIUM SERPL-SCNC: 140 MMOL/L — SIGNIFICANT CHANGE UP (ref 135–145)
TRIGL SERPL-MCNC: 71 MG/DL — SIGNIFICANT CHANGE UP
TSH SERPL-MCNC: 1.22 UIU/ML — SIGNIFICANT CHANGE UP (ref 0.27–4.2)
WBC # BLD: 5.66 K/UL — SIGNIFICANT CHANGE UP (ref 3.8–10.5)
WBC # FLD AUTO: 5.66 K/UL — SIGNIFICANT CHANGE UP (ref 3.8–10.5)

## 2024-12-30 RX ORDER — SOD PHOS DI, MONO/K PHOS MONO 250 MG
2 TABLET ORAL ONCE
Refills: 0 | Status: COMPLETED | OUTPATIENT
Start: 2024-12-30 | End: 2024-12-30

## 2024-12-30 RX ORDER — POTASSIUM CHLORIDE 600 MG/1
40 TABLET, FILM COATED, EXTENDED RELEASE ORAL ONCE
Refills: 0 | Status: COMPLETED | OUTPATIENT
Start: 2024-12-30 | End: 2024-12-30

## 2024-12-30 RX ADMIN — Medication 5 MILLIGRAM(S): at 05:42

## 2024-12-30 RX ADMIN — LEVOTHYROXINE SODIUM 75 MICROGRAM(S): 175 TABLET ORAL at 05:07

## 2024-12-30 RX ADMIN — Medication 2 TABLET(S): at 10:53

## 2024-12-30 RX ADMIN — POTASSIUM CHLORIDE 40 MILLIEQUIVALENT(S): 600 TABLET, FILM COATED, EXTENDED RELEASE ORAL at 10:53

## 2024-12-30 RX ADMIN — Medication 50 MILLIGRAM(S): at 05:07

## 2024-12-30 RX ADMIN — Medication 81 MILLIGRAM(S): at 11:38

## 2024-12-30 NOTE — DISCHARGE NOTE NURSING/CASE MANAGEMENT/SOCIAL WORK - NSDCPEFALRISK_GEN_ALL_CORE
For information on Fall & Injury Prevention, visit: [email or web address] OR  [email or web address] OR  https://www.cdc.gov/steadi/patient.html

## 2024-12-30 NOTE — DISCHARGE NOTE PROVIDER - HOSPITAL COURSE
74-year-old male past medical history of HTN , BPH , Hypothyroidism  & CAD status post 5 stents presenting to the emergency department with nausea, vomiting and diarrhea.  Patient endorses daughter is a physician and has the Norovirus.  Patient 3 episodes of nonbloody emesis last night.  Endorses an episode of watery diarrhea at 3 AM this morning.  Patient felt lightheaded after standing up from the toilet and fell to the ground on his right side.  He denies loss of consciousness or head strike.  Patient was able to get up on his own and ambulate after incident.  Patient denies nausea at this time, endorses mild right-sided pain around ribs 7 through 10.  Denies any fever, abdominal pain, chest pain, shortness of breath.     Problem/Plan - 1:  ·  Problem: Acute gastroenteritis.   ·  Plan: Slightly improving.   - Stool PCR with Norovirus, contracted likely from sick contact (daughter)  advance diet as tolerated, now tolerating regular diet  - diarrhea resolved  Leucocytosis likely secondary to AGE.  < from: CT Abdomen and Pelvis w/ IV Cont (12.28.24 @ 12:34) >    IMPRESSION:  No evidence of bowel obstruction, diverticulitis, or colitis.  No visualized displaced right-sided rib fractures.    < end of copied text >.     Problem/Plan - 2:  ·  Problem: Lightheadedness.   ·  Plan: Likely secondary to dehydration with ? low BP from AGE   - +Orthostasis .  Now S/P IVF, pt asymptomatic.   - Cards cleared pt for dc, recommends outpatient TTE     Problem/Plan - 3:  ·  Problem: HTN (hypertension).   ·  Plan: BP medication with hold parameters.     Problem/Plan - 4:  ·  Problem: Hypothyroidism.   ·  Plan: Home dose synthroid.     Problem/Plan - 5:  ·  Problem: CAD in native artery.   ·  Plan: Continuing ASA, BB and Statin.     Problem/Plan - 6:  ·  Problem: BPH without urinary obstruction.   ·  Plan: On Finasteride.     Problem/Plan - 7:  ·  Problem: GERD (gastroesophageal reflux disease).   ·  Plan: Continue PPI.    Patient seen and evaluated, no acute somatic complaints. Reviewed discharge medications with patient; All new medications requiring new prescriptions were sent to the pharmacy of patient's choice. Reviewed need for prescription for previous home medications and new prescriptions sent if requested. Medically cleared/stable for discharge as per Dr. Roche on 12/30/2024 with close outpatient follow up within 1-2 weeks of discharge. Patient understands and agrees with plan of care.

## 2024-12-30 NOTE — DISCHARGE NOTE NURSING/CASE MANAGEMENT/SOCIAL WORK - FINANCIAL ASSISTANCE
[facility] provides services at a reduced cost to those who are determined to be eligible through [facility]’s financial assistance program. Information regarding [facility]’s financial assistance program can be found by going to [email or web address] or by calling [phone].

## 2024-12-30 NOTE — DISCHARGE NOTE NURSING/CASE MANAGEMENT/SOCIAL WORK - PATIENT PORTAL LINK FT
You can access the FollowMyHealth Patient Portal offered by [facility] by registering at the following website: [email or web address]. By joining [facility]’s FollowMyHealth portal, you will also be able to view your health information using other applications (apps) compatible with our system.

## 2024-12-30 NOTE — DISCHARGE NOTE PROVIDER - NSDCQMCOGNITION_NEU_ALL_CORE
Demographics verified. PT is agreeable to services and has no other agency    Nursing, PT, and OT    Via secure chat, Brinda Tony NP is agreeable to sign the plan of care and follow for home health orders    Pharmacy:[facility] in [place]  
No difficulties

## 2024-12-30 NOTE — CONSULT NOTE ADULT - SUBJECTIVE AND OBJECTIVE BOX
CHIEF COMPLAINT: diarrhea, dizziness    HISTORY OF PRESENT ILLNESS:   74-year-old male past medical history of HTN , BPH , Hypothyroidism  & CAD status post 5 stents presenting to the emergency department with nausea, vomiting and diarrhea.  Patient endorses daughter is a physician and has the Norovirus.  Patient 3 episodes of nonbloody emesis last night.  Endorses an episode of watery diarrhea at 3 AM this morning.  Patient felt lightheaded after standing up from the toilet and fell to the ground on his right side.  He denies loss of consciousness or head strike.  Patient was able to get up on his own and ambulate after incident.  Patient denies nausea at this time, endorses mild right-sided pain around ribs 7 through 10.  Denies any fever, abdominal pain, chest pain, shortness of breath.        Allergies    No Known Allergies    Intolerances    	    MEDICATIONS:  amLODIPine   Tablet 5 milliGRAM(s) Oral daily  aspirin enteric coated 81 milliGRAM(s) Oral daily  metoprolol succinate ER 50 milliGRAM(s) Oral daily        ondansetron Injectable 4 milliGRAM(s) IV Push every 8 hours PRN    famotidine Injectable 20 milliGRAM(s) IV Push once    atorvastatin 40 milliGRAM(s) Oral at bedtime  finasteride 5 milliGRAM(s) Oral daily  levothyroxine 75 MICROGram(s) Oral daily    potassium chloride    Tablet ER 40 milliEquivalent(s) Oral once  potassium phosphate / sodium phosphate Tablet (K-PHOS No. 2) 2 Tablet(s) Oral once      PAST MEDICAL & SURGICAL HISTORY:  Coronary artery disease      Benign prostatic hypertrophy      HTN (hypertension)      Myocardial infarction acute  anterior wall 2009      CAD (coronary artery disease)      Stented coronary artery  endeavor x2 RCA 2009 - [facility]      Acute on chronic blood loss anemia      History of upper respiratory infection      H/O laryngitis      H/O heart artery stent      No significant past surgical history          FAMILY HISTORY:  Family history of stent (Mother)  mother living with coronary stents placed at 79yo        SOCIAL HISTORY:    non smoker. indep in adl      REVIEW OF SYSTEMS:  See HPI, otherwise complete 10 point review of systems negative    [ ] All others negative	      PHYSICAL EXAM:  T(C): 36.8 (12-30-24 @ 04:06), Max: 37 (12-29-24 @ 09:00)  HR: 57 (12-30-24 @ 04:06) (57 - 72)  BP: 134/70 (12-30-24 @ 04:06) (134/70 - 166/68)  RR: 17 (12-30-24 @ 04:06) (16 - 18)  SpO2: 97% (12-30-24 @ 04:06) (96% - 100%)  Wt(kg): --  I&O's Summary    29 Dec 2024 07:01  -  30 Dec 2024 07:00  --------------------------------------------------------  IN: 1170 mL / OUT: 850 mL / NET: 320 mL        Appearance: No Acute Distress	  HEENT:  Normal oral mucosa, PERRL, EOMI	  Cardiovascular: Normal S1 S2, No JVD, No murmurs/rubs/gallops  Respiratory: Lungs clear to auscultation bilaterally  Gastrointestinal:  Soft, Non-tender, + BS	  Skin: No rashes, No ecchymoses, No cyanosis	  Neurologic: Non-focal  Extremities: No clubbing, cyanosis or edema  Vascular: Peripheral pulses palpable 2+ bilaterally  Psychiatry: A & O x 3, Mood & affect appropriate    Laboratory Data:	 	    CBC Full  -  ( 30 Dec 2024 04:10 )  WBC Count : 5.66 K/uL  Hemoglobin : 14.0 g/dL  Hematocrit : 43.5 %  Platelet Count - Automated : 179 K/uL  Mean Cell Volume : 81.5 fL  Mean Cell Hemoglobin : 26.2 pg  Mean Cell Hemoglobin Concentration : 32.2 g/dL  Auto Neutrophil # : x  Auto Lymphocyte # : x  Auto Monocyte # : x  Auto Eosinophil # : x  Auto Basophil # : x  Auto Neutrophil % : x  Auto Lymphocyte % : x  Auto Monocyte % : x  Auto Eosinophil % : x  Auto Basophil % : x    12-30    140  |  104  |  15  ----------------------------<  129[H]  3.4[L]   |  23  |  0.83  12-29    140  |  105  |  15  ----------------------------<  142[H]  3.6   |  24  |  0.92    Ca    8.5      30 Dec 2024 04:10  Ca    8.6      29 Dec 2024 04:18  Phos  2.3     12-30  Phos  2.5     12-28  Mg     2.00     12-30  Mg     2.10     12-28    TPro  8.2  /  Alb  4.7  /  TBili  1.0  /  DBili  x   /  AST  33  /  ALT  38  /  AlkPhos  97  12-28      proBNP:   Lipid Profile:   HgA1c:   TSH: Thyroid Stimulating Hormone, Serum: 1.22 uIU/mL (12-30 @ 04:10)        CARDIAC MARKERS:            Interpretation of Telemetry: 	    ECG:  	  RADIOLOGY:  OTHER: 	    PREVIOUS DIAGNOSTIC TESTING:    [ ] Echocardiogram:  [ ] Catheterization:  [ ] Stress Test:  	    Assessment:   74-year-old male past medical history of HTN , BPH , Hypothyroidism  & CAD status post 5 stents presenting to the emergency department with nausea, vomiting and diarrhea. also one episode of syncope in setting of emesis. found to have norovirus    Recs:  cardiac stable  no e/o acs or chf  cw antihypertensives as dosed  cw antiplatelet, statin and antianginals  orthostatics prn sx  echo - inpt or outpatient  avoid diuretics; maintain euvolemic  adv diet as able  will follow            Greater than 60 minutes spent on total encounter; more than 50% of the visit was spent counseling and/or coordinating care by the attending physician.   	  Donnie Preciado MD   Cardiovascular Diseases  [phone]

## 2024-12-30 NOTE — DISCHARGE NOTE PROVIDER - NSDCFUADDAPPT_GEN_ALL_CORE_FT
APPTS ARE READY TO BE MADE: [X] YES    Best Family or Patient Contact (if needed):    Additional Information about above appointments (if needed):    1: PCP  2: Endocrinology   3:     Other comments or requests:

## 2024-12-30 NOTE — DISCHARGE NOTE PROVIDER - NSDCCAREPROVSEEN_GEN_ALL_CORE_FT
Brii Roche Brii Martinez  Ordering Physician  Donnie Preciado  Team [facility] Medicine ACP      [ Greater than 35 min spent for discharge services.   SUSHANT Roche ]       ( Note written / Date of service is the same as last day of patient stay  in the hospital ( for billing purposes)))

## 2024-12-30 NOTE — DISCHARGE NOTE NURSING/CASE MANAGEMENT/SOCIAL WORK - NSDCVIVACCINE_GEN_ALL_CORE_FT
Td (adult) preservative free; 16-Oct-2021 15:55; Sonja Mcginnis (RN); [employer]; [id number] (Exp. Date: 08-Oct-2022); IntraMuscular; Deltoid Right.; 0.5 milliLiter(s); VIS (VIS Published: 07-Oct-2022, VIS Presented: 16-Oct-2021);

## 2024-12-30 NOTE — DISCHARGE NOTE PROVIDER - CARE PROVIDER_API CALL
Donnie Preciado  Cardiovascular Disease  [address] [place] [id number]  Phone: [phone]  Fax: [phone]  Follow Up Time:

## 2024-12-30 NOTE — PROGRESS NOTE ADULT - ASSESSMENT
M E D I C A L   A T T E N D I N G    F O L L O W    U P   N O T E  (12-30-24 )                                     ------------------------------------------------------------------------------------------------    patient evaluated by me, case discussed with team, chart, medications, and physical exam reviewed, labs / tests  and vitals reviewed by me, as bellow.   Patient is stable for discharge today. viral gastroenteritis.. overall better.. wants to go home..   Patient to follow up with  PMD / cardio   See discharge document for full note (discussed with ACP).  [Greater than 35 min spent for these services. ]          ( Note written / Date of service 12-30-24 ( This is certified to be the same as "ENTERED" date above ( for billing purposes)))    ==================>> MEDICATIONS <<====================    amLODIPine   Tablet 5 milliGRAM(s) Oral daily  aspirin enteric coated 81 milliGRAM(s) Oral daily  atorvastatin 40 milliGRAM(s) Oral at bedtime  famotidine Injectable 20 milliGRAM(s) IV Push once  finasteride 5 milliGRAM(s) Oral daily  levothyroxine 75 MICROGram(s) Oral daily  metoprolol succinate ER 50 milliGRAM(s) Oral daily    MEDICATIONS  (PRN):  ondansetron Injectable 4 milliGRAM(s) IV Push every 8 hours PRN Nausea and/or Vomiting    ___________  Active diet:  Diet, Regular:   DASH/TLC Sodium & Cholesterol Restricted (DASH)  ___________________    ==================>> VITAL SIGNS <<==================    T(C): 36.3 (12-30-24 @ 08:50), Max: 37 (12-30-24 @ 00:25)  HR: 64 (12-30-24 @ 08:50) (57 - 65)  BP: 177/69 (12-30-24 @ 08:50) (134/70 - 177/69) > resume all home medications   RR: 16 (12-30-24 @ 08:50) (16 - 18)  SpO2: 97% (12-30-24 @ 08:50) (96% - 100%)       I&O's Summary    29 Dec 2024 07:01  -  30 Dec 2024 07:00  --------------------------------------------------------  IN: 1170 mL / OUT: 850 mL / NET: 320 mL    30 Dec 2024 07:01  -  30 Dec 2024 11:23  --------------------------------------------------------  IN: 240 mL / OUT: 400 mL / NET: -160 mL       ==================>> LAB AND IMAGING <<==================                        14.0   5.66  )-----------( 179      ( 30 Dec 2024 04:10 )             43.5        12-30    140  |  104  |  15  ----------------------------<  129[H]  3.4[L]   |  23  |  0.83    Ca    8.5      30 Dec 2024 04:10  Phos  2.3     12-30  Mg     2.00     12-30       TSH:      1.22   (12-30-24)           Lipid profile:  (12-30-24)     Total: 96     LDL  : (p)     HDL  :44     TG   :71     HgA1C:   (12-30-24)          (12-30-24)      6.7    WBC count:   5.66 <<== ,  7.09 <<== ,  14.41 <<==   Hemoglobin:   14.0 <<==,  14.3 <<==,  15.8 <<==  platelets:  179 <==, 188 <==, 212 <==    Creatinine:  0.83  <<==, 0.92  <<==, 0.83  <<==  Sodium:   140  <==, 140  <==, 138  <==    ____________________________    M I C R O B I O L O G Y :    GI PCR Panel Stool (12.28.24 @ 20:57)    GI PCR Panel: Detected: GI Panel PCR evaluates for:  Campylobacter, Plesiomonas shigelloides, Salmonella, Vibrio, Yersinia  enterocolitica, Enteroaggregative Escherichia (EAEC), Enteropathogenic E.  coli (EPEC), Enterotoxigenic E. coli (ETEC), Shiga-like toxin producing  E.coli (STEC), E. coli O157, Shigella/Enteroinvasive E. coli (EIEC),  Adenovirus, Astrovirus, Norovirus, Rotavirus, Sapovirus, Cryptosporidium,  Cyclospora cayetanensis, Entamoeba histolytica, Giardia lamblia.  For culture and susceptibility reports refer to "reflex stool culture".   Norovirus GI/GII: Detected            ( Note written / Date of service 12-30-24 ( This is certified to be the same as "ENTERED" date above ( for billing Purposes )))

## 2024-12-30 NOTE — DISCHARGE NOTE PROVIDER - NSDCMRMEDTOKEN_GEN_ALL_CORE_FT
Aspirin Enteric Coated 81 mg oral delayed release tablet: 1 tab(s) orally once a day  finasteride 5 mg oral tablet: 1 tab(s) orally once a day  Lipitor 40 mg oral tablet: 1 tab(s) orally once a day alternating with 80mg every other day  losartan 25 mg oral tablet: 1 tab(s) orally once as needed for  systolic blood pressure above 150  metoprolol succinate 50 mg oral tablet, extended release: 1 tab(s) orally once a day  pantoprazole 40 mg oral delayed release tablet: 1 tab(s) orally once a day (before a meal)  Synthroid 75 mcg (0.075 mg) oral tablet: 1 tab(s) orally once a day   amLODIPine 5 mg oral tablet: 1 tab(s) orally once a day  Aspirin Enteric Coated 81 mg oral delayed release tablet: 1 tab(s) orally once a day  finasteride 5 mg oral tablet: 1 tab(s) orally once a day  Lipitor 40 mg oral tablet: 1 tab(s) orally once a day alternating with 80mg every other day  losartan 25 mg oral tablet: 1 tab(s) orally once as needed for  systolic blood pressure above 150  metoprolol succinate 50 mg oral tablet, extended release: 1 tab(s) orally once a day  pantoprazole 40 mg oral delayed release tablet: 1 tab(s) orally once a day (before a meal)  Synthroid 75 mcg (0.075 mg) oral tablet: 1 tab(s) orally once a day

## 2024-12-30 NOTE — DISCHARGE NOTE PROVIDER - NSDCCPCAREPLAN_GEN_ALL_CORE_FT
PRINCIPAL DISCHARGE DIAGNOSIS  Diagnosis: Acute gastroenteritis  Assessment and Plan of Treatment: You were diagnosed with norovirus causing diarrhea, likely contracted from your daughter. You were given fluids and resumed your normal diet.      SECONDARY DISCHARGE DIAGNOSES  Diagnosis: Lightheadedness  Assessment and Plan of Treatment: You likely had dizziness due to dehydration. Please follow up with pcp/cardiology whom recommended echocardiogram outpatient. CT abdomen and pelvis was normal.    Diagnosis: Type 2 diabetes mellitus  Assessment and Plan of Treatment: Your A1c is 6.7%.  Continue a consistent carbohydrate diet (Meaning eating the same amount of carbohydrates at the same time each day). Monitor blood glucose levels four times a day before meals and at bedtime. Record blood sugars and bring to outpatient providers appointment in order to be reviewed by your doctor for management modifications. If your sugars are more than 400 or less than 70 you should contact your Primary Care Physician immediately. Monitor for signs/symptoms of low blood glucose, such as, dizziness, altered mental status, or cool/clammy skin. In addition, monitor for signs/symptoms of high blood glucose, such as, feeling hot, dry, fatigued, or with increased thirst/urination.  Exercise daily for at least 30 minutes and weight loss.  Follow up with your primary care physician and endocrinologist for routine Hemoglobin A1C checks and management.  Follow up with your ophthalmologist for routine yearly vision exams. Make regular podiatry appointments in order to have feet checked for wounds.

## 2024-12-30 NOTE — DISCHARGE NOTE PROVIDER - NSFOLLOWUPCLINICS_GEN_ALL_ED_FT
[facility] Endocrinology  Endocrinology  [address] [place] [address]  Phone: [phone]  Fax:

## 2024-12-31 LAB
CULTURE RESULTS: SIGNIFICANT CHANGE UP
SPECIMEN SOURCE: SIGNIFICANT CHANGE UP

## 2025-01-17 NOTE — H&P CARDIOLOGY - NS MD HP PULSE RADIAL
jada test normal right/left normal/right normal [Nasal Congestion] : nasal congestion [Negative] : Genitourinary [Cyanosis] : no cyanosis [Diaphoresis] : not diaphoretic [Edema] : no edema

## 2025-04-05 ENCOUNTER — RX RENEWAL (OUTPATIENT)
Age: 75
End: 2025-04-05

## 2025-06-18 ENCOUNTER — NON-APPOINTMENT (OUTPATIENT)
Age: 75
End: 2025-06-18

## 2025-06-18 ENCOUNTER — APPOINTMENT (OUTPATIENT)
Dept: UROLOGY | Facility: CLINIC | Age: 75
End: 2025-06-18
Payer: COMMERCIAL

## 2025-06-18 VITALS — DIASTOLIC BLOOD PRESSURE: 76 MMHG | SYSTOLIC BLOOD PRESSURE: 181 MMHG

## 2025-06-18 PROBLEM — Z87.898 HISTORY OF GROSS HEMATURIA: Status: RESOLVED | Noted: 2023-06-22 | Resolved: 2025-06-18

## 2025-06-18 PROCEDURE — 99213 OFFICE O/P EST LOW 20 MIN: CPT

## 2025-06-18 RX ORDER — AMLODIPINE AND OLMESARTAN MEDOXOMIL 10; 40 MG/1; MG/1
TABLET ORAL
Refills: 0 | Status: ACTIVE | COMMUNITY

## 2025-08-14 ENCOUNTER — APPOINTMENT (OUTPATIENT)
Dept: ORTHOPEDIC SURGERY | Facility: CLINIC | Age: 75
End: 2025-08-14
Payer: COMMERCIAL

## 2025-08-14 DIAGNOSIS — M65.342 TRIGGER FINGER, LEFT RING FINGER: ICD-10-CM

## 2025-08-14 PROCEDURE — 99213 OFFICE O/P EST LOW 20 MIN: CPT | Mod: 25

## 2025-08-14 PROCEDURE — 20550 NJX 1 TENDON SHEATH/LIGAMENT: CPT | Mod: F3
